# Patient Record
Sex: FEMALE | Race: BLACK OR AFRICAN AMERICAN | NOT HISPANIC OR LATINO | Employment: OTHER | URBAN - METROPOLITAN AREA
[De-identification: names, ages, dates, MRNs, and addresses within clinical notes are randomized per-mention and may not be internally consistent; named-entity substitution may affect disease eponyms.]

---

## 2017-03-01 ENCOUNTER — LAB VISIT (OUTPATIENT)
Dept: LAB | Facility: HOSPITAL | Age: 62
End: 2017-03-01
Attending: INTERNAL MEDICINE
Payer: COMMERCIAL

## 2017-03-01 DIAGNOSIS — M81.0 OSTEOPOROSIS: ICD-10-CM

## 2017-03-01 DIAGNOSIS — E21.1 HYPERPARATHYROIDISM , SECONDARY, NON-RENAL: ICD-10-CM

## 2017-03-01 LAB
25(OH)D3+25(OH)D2 SERPL-MCNC: 39 NG/ML
ALBUMIN SERPL BCP-MCNC: 3.7 G/DL
ALP SERPL-CCNC: 58 U/L
ALT SERPL W/O P-5'-P-CCNC: 27 U/L
ANION GAP SERPL CALC-SCNC: 6 MMOL/L
AST SERPL-CCNC: 23 U/L
BILIRUB SERPL-MCNC: 0.4 MG/DL
BUN SERPL-MCNC: 14 MG/DL
CALCIUM SERPL-MCNC: 8.6 MG/DL
CHLORIDE SERPL-SCNC: 107 MMOL/L
CO2 SERPL-SCNC: 26 MMOL/L
CREAT SERPL-MCNC: 0.8 MG/DL
EST. GFR  (AFRICAN AMERICAN): >60 ML/MIN/1.73 M^2
EST. GFR  (NON AFRICAN AMERICAN): >60 ML/MIN/1.73 M^2
GLUCOSE SERPL-MCNC: 110 MG/DL
PHOSPHATE SERPL-MCNC: 2.9 MG/DL
POTASSIUM SERPL-SCNC: 4.3 MMOL/L
PROT SERPL-MCNC: 7.1 G/DL
PTH-INTACT SERPL-MCNC: 163 PG/ML
SODIUM SERPL-SCNC: 139 MMOL/L

## 2017-03-01 PROCEDURE — 80053 COMPREHEN METABOLIC PANEL: CPT

## 2017-03-01 PROCEDURE — 36415 COLL VENOUS BLD VENIPUNCTURE: CPT | Mod: PO

## 2017-03-01 PROCEDURE — 82306 VITAMIN D 25 HYDROXY: CPT

## 2017-03-01 PROCEDURE — 84100 ASSAY OF PHOSPHORUS: CPT

## 2017-03-01 PROCEDURE — 83970 ASSAY OF PARATHORMONE: CPT

## 2017-03-07 ENCOUNTER — OFFICE VISIT (OUTPATIENT)
Dept: INTERNAL MEDICINE | Facility: CLINIC | Age: 62
End: 2017-03-07
Payer: COMMERCIAL

## 2017-03-07 VITALS
BODY MASS INDEX: 31.34 KG/M2 | OXYGEN SATURATION: 98 % | HEART RATE: 67 BPM | DIASTOLIC BLOOD PRESSURE: 80 MMHG | SYSTOLIC BLOOD PRESSURE: 122 MMHG | HEIGHT: 61 IN | WEIGHT: 166 LBS

## 2017-03-07 DIAGNOSIS — M85.80 OSTEOPENIA: ICD-10-CM

## 2017-03-07 DIAGNOSIS — Z78.0 POSTMENOPAUSAL ESTROGEN DEFICIENCY: ICD-10-CM

## 2017-03-07 DIAGNOSIS — Z12.4 CERVICAL CANCER SCREENING: ICD-10-CM

## 2017-03-07 DIAGNOSIS — I10 BENIGN ESSENTIAL HYPERTENSION: Primary | ICD-10-CM

## 2017-03-07 DIAGNOSIS — J31.0 CHRONIC RHINITIS: ICD-10-CM

## 2017-03-07 DIAGNOSIS — I77.9 BILATERAL CAROTID ARTERY DISEASE: ICD-10-CM

## 2017-03-07 PROCEDURE — 99214 OFFICE O/P EST MOD 30 MIN: CPT | Mod: S$GLB,,, | Performed by: INTERNAL MEDICINE

## 2017-03-07 PROCEDURE — 1160F RVW MEDS BY RX/DR IN RCRD: CPT | Mod: S$GLB,,, | Performed by: INTERNAL MEDICINE

## 2017-03-07 PROCEDURE — 3074F SYST BP LT 130 MM HG: CPT | Mod: S$GLB,,, | Performed by: INTERNAL MEDICINE

## 2017-03-07 PROCEDURE — 3079F DIAST BP 80-89 MM HG: CPT | Mod: S$GLB,,, | Performed by: INTERNAL MEDICINE

## 2017-03-07 PROCEDURE — 99999 PR PBB SHADOW E&M-EST. PATIENT-LVL III: CPT | Mod: PBBFAC,,, | Performed by: INTERNAL MEDICINE

## 2017-03-07 RX ORDER — TRIAMCINOLONE ACETONIDE 55 UG/1
2 SPRAY, METERED NASAL DAILY
Qty: 16.5 G | Refills: 1 | Status: SHIPPED | OUTPATIENT
Start: 2017-03-07 | End: 2023-07-11

## 2017-03-07 NOTE — PROGRESS NOTES
"Subjective:       Patient ID: Nga Livingston is a 61 y.o. female.    Chief Complaint: Follow-up    HPI   HTN - coreg 25mg bid, losartan 25mg daily. Reports BP doing ok at home.   Has been exercising but hasn't been doing that lately. Treadmill and biking. Light weight resistance.   Osteoporosis in the past w/ improvement on fosamax-->osteopenia range. On fosamax for at least 3 yrs. No falls recently but h/o falls.     Reports urination better. On ditropan xl 5mg daily. No issues w/ dysuria/hematuria/urinary frequency/urgency.    Occasionally w/ some rhinorrhea/postnasal drip.     No numbness/tingling/weakness. B carotid artery stenosis 20-39% on US of carotids    Sometimes will get a sharp L sided pain. Attributes it to gas. Doesn't last long. Resolves on its own. Occurs every 2 weeks. Can occur w/ any position. Never occurs w/ exercising.     Review of Systems  as above in HPI.     Objective:      Physical Exam    /80 (BP Location: Right arm, Patient Position: Sitting, BP Method: Manual)  Pulse 67  Ht 5' 1" (1.549 m)  Wt 75.3 kg (166 lb 0.1 oz)  SpO2 98%  BMI 31.37 kg/m2    GEN - A+OX4, NAD   HEENT - PERRL, EOMI, OP clear  Neck - No thyromegaly or cervical LAD. No thyroid masses felt.  CV - RRR, no m/r. Mild L carotid bruit.  Chest - CTAB, no wheezing or rhonchi.  Abd - S/NT/ND/+BS.   Ext - 1+BDP and 2+ radial pulses. No LE edema.   Neuro - 5/5 BUE and BLE strength. 2+ DTRs.   Skin - No rash.    Labs reviewed. Alk phos normalized w/ normalization of Vit d.    Assessment/Plan     Nga was seen today for follow-up.    Diagnoses and all orders for this visit:    Benign essential hypertension - Stable and controlled. Continue current medications.    Postmenopausal estrogen deficiency - on fosamax for >3 yrs. Drug holiday. Repeat dexa.   -     DXA Bone Density Spine And Hip; Future    Osteopenia  -     DXA Bone Density Spine And Hip; Future    BMI 31.37,adult - back to exercising. Watch caloric intake. "     Cervical cancer screening  -     Ambulatory Referral to Obstetrics / Gynecology    Chronic rhinitis  -     triamcinolone (NASACORT) 55 mcg nasal inhaler; 2 sprays by Nasal route once daily.    Bilateral carotid artery disease - on asa 81 and atorva 40. Family history of CVA.  -     Cardiology Lab Carotid US Bilateral; Future      Return in about 4 months (around 7/7/2017). for annual.      Elaine Mahmood MD  Department of Internal Medicine - [facility]  9:27 AM

## 2017-03-07 NOTE — MR AVS SNAPSHOT
[facility]  [address]  [facility] [address]  Phone: [phone]  Fax: [phone]                  Nga Livingston   3/7/2017 9:00 AM   Office Visit    Description:  Female : 1955   Provider:  Elaine Mahmood MD   Department:  [facility]           Reason for Visit     Follow-up           Diagnoses this Visit        Comments    Benign essential hypertension    -  Primary     Postmenopausal estrogen deficiency         Osteopenia         BMI 31.0-31.9,adult         Cervical cancer screening         Chronic rhinitis         Bilateral carotid artery disease                To Do List           Future Appointments        Provider Department Dept Phone    3/21/2017 9:00 AM MD [facility] - Urology 4th Floor [phone]    2017 8:40 AM MD [facility] - Internal Medicine [phone]      Goals (5 Years of Data)     None      Follow-Up and Disposition     Return in about 4 months (around 2017).    Follow-up and Disposition History       These Medications        Disp Refills Start End    triamcinolone (NASACORT) 55 mcg nasal inhaler 16.5 g 1 3/7/2017     2 sprays by Nasal route once daily. - Nasal    Pharmacy: [facility] [facility]. - [facility]ANCOIS - [facility] #: [phone]         [facility] On Call     [facility] On Call Nurse Care Line -  Assistance  Registered nurses in the [facility] On Call Center provide clinical advisement, health education, appointment booking, and other advisory services.  Call for this free service at [phone].             Medications           Message regarding Medications     Verify the changes and/or additions to your medication regime listed below are the same as discussed with your clinician today.  If any of these changes or additions are incorrect, please notify your healthcare provider.        STOP taking these medications     alendronate (FOSAMAX) 70 MG tablet Take once a  "week, in the morning, sitting up on an empty stomach for 30 minutes.    azelastine (ASTELIN) 137 mcg (0.1 %) nasal spray 1 spray (137 mcg total) by Nasal route 2 (two) times daily.           Verify that the below list of medications is an accurate representation of the medications you are currently taking.  If none reported, the list may be blank. If incorrect, please contact your healthcare provider. Carry this list with you in case of emergency.           Current Medications     aspirin 81 MG Chew Take 81 mg by mouth once daily.    atorvastatin (LIPITOR) 40 MG tablet Take 40 mg by mouth once daily.    biotin 1 mg tablet Take 1,000 mcg by mouth once daily.    carvedilol (COREG) 25 MG tablet take 1 tablet by mouth twice a day with meals    esomeprazole (NEXIUM) 40 MG capsule take 1 capsule by mouth every morning    fish oil-omega-3 fatty acids 300-1,000 mg capsule Take 2 g by mouth once daily.    losartan (COZAAR) 25 MG tablet Take 1 tablet (25 mg total) by mouth every evening.    oxybutynin (DITROPAN-XL) 5 MG TR24 Take 1 tablet (5 mg total) by mouth once daily.    RESTASIS 0.05 % ophthalmic emulsion     triamcinolone (NASACORT) 55 mcg nasal inhaler 2 sprays by Nasal route once daily.    vitamin D 1000 units Tab Take 185 mg by mouth once daily.    vitamin E 100 UNIT capsule Take 100 Units by mouth once daily.           Clinical Reference Information           Your Vitals Were     BP Pulse Height Weight SpO2 BMI    122/80 (BP Location: Right arm, Patient Position: Sitting, BP Method: Manual) 67 5' 1" (1.549 m) 75.3 kg (166 lb 0.1 oz) 98% 31.37 kg/m2      Blood Pressure          Most Recent Value    BP  122/80      Allergies as of 3/7/2017     Iodinated Contrast Media - Iv Dye      Immunizations Administered on Date of Encounter - 3/7/2017     None      Orders Placed During Today's Visit      Normal Orders This Visit    Ambulatory Referral to Obstetrics / Gynecology     Future Labs/Procedures Expected by Expires    " DXA Bone Density Spine And Hip  3/7/2017 3/7/2018    Cardiology Lab Carotid US Bilateral  As directed 3/7/2018      [facility] Sign-Up     Activating your [facility] account is as easy as 1-2-3!     1) Visit [email or web address], select Sign Up Now, enter this activation code and your date of birth, then select Next.  [email or web address]  Expires: 4/21/2017  9:38 AM      2) Create a username and password to use when you visit [facility] in the future and select a security question in case you lose your password and select Next.    3) Enter your e-mail address and click Sign Up!    Additional Information  If you have questions, please e-mail [email or web address] or call [phone] to talk to our [facility] staff. Remember, [facility] is NOT to be used for urgent needs. For medical emergencies, dial 911.         Language Assistance Services     ATTENTION: Language assistance services are available, free of charge. Please call [phone].      ATENCIÓN: Si habla español, tiene a laguna disposición servicios gratuitos de asistencia lingüística. Llame al [phone].     JODI Ý: N?u b?n nói Ti?ng Vi?t, có các d?ch v? h? tr? ngôn ng? mi?n phí dành cho b?n. G?i s? [phone].         [facility] complies with applicable Federal civil rights laws and does not discriminate on the basis of race, color, national origin, age, disability, or sex.

## 2017-03-21 ENCOUNTER — OFFICE VISIT (OUTPATIENT)
Dept: UROLOGY | Facility: CLINIC | Age: 62
End: 2017-03-21
Payer: COMMERCIAL

## 2017-03-21 VITALS
DIASTOLIC BLOOD PRESSURE: 83 MMHG | SYSTOLIC BLOOD PRESSURE: 139 MMHG | BODY MASS INDEX: 29.57 KG/M2 | HEIGHT: 63 IN | WEIGHT: 166.88 LBS | HEART RATE: 58 BPM

## 2017-03-21 DIAGNOSIS — E21.1 HYPERPARATHYROIDISM , SECONDARY, NON-RENAL: ICD-10-CM

## 2017-03-21 DIAGNOSIS — Z87.891 FORMER SMOKER: ICD-10-CM

## 2017-03-21 DIAGNOSIS — R35.0 URINARY FREQUENCY: Primary | ICD-10-CM

## 2017-03-21 DIAGNOSIS — R31.29 MICROHEMATURIA: ICD-10-CM

## 2017-03-21 LAB
BILIRUB SERPL-MCNC: NORMAL MG/DL
BLOOD URINE, POC: NORMAL
COLOR, POC UA: NORMAL
GLUCOSE UR QL STRIP: NORMAL
KETONES UR QL STRIP: NORMAL
LEUKOCYTE ESTERASE URINE, POC: NORMAL
NITRITE, POC UA: NORMAL
PH, POC UA: 8
PROTEIN, POC: NORMAL
SPECIFIC GRAVITY, POC UA: 1
UROBILINOGEN, POC UA: NORMAL

## 2017-03-21 PROCEDURE — 99999 PR PBB SHADOW E&M-EST. PATIENT-LVL III: CPT | Mod: PBBFAC,,, | Performed by: UROLOGY

## 2017-03-21 PROCEDURE — 3079F DIAST BP 80-89 MM HG: CPT | Mod: S$GLB,,, | Performed by: UROLOGY

## 2017-03-21 PROCEDURE — 3075F SYST BP GE 130 - 139MM HG: CPT | Mod: S$GLB,,, | Performed by: UROLOGY

## 2017-03-21 PROCEDURE — 81001 URINALYSIS AUTO W/SCOPE: CPT | Mod: S$GLB,,, | Performed by: UROLOGY

## 2017-03-21 PROCEDURE — 99214 OFFICE O/P EST MOD 30 MIN: CPT | Mod: 25,S$GLB,, | Performed by: UROLOGY

## 2017-03-21 PROCEDURE — 1160F RVW MEDS BY RX/DR IN RCRD: CPT | Mod: S$GLB,,, | Performed by: UROLOGY

## 2017-03-21 NOTE — PROGRESS NOTES
Subjective:       Patient ID: Nga Livingston is a 61 y.o. female.    Chief Complaint: Hematuria    HPI Comments: Nga Livingston is a 61 y.o. Female here for follow up of urinary frequency.  Had microhematuria with negative workup 7/15.  She had urinary frequency, urgency, urge incontinence, resolved with oxybutynin XR 5mg.   She is on oxybutynin XR 5mg.   Nocturia not present if limits fluids. She does wear a panty liner.  No dysuria. No gross hematuria.   3 RBC per HPF 4/15. 7/16 ua negative.   Former smoker.    Past Medical History:   Diagnosis Date    Depression     High blood cholesterol     Hypertension     Osteoporosis 3/26/15    outside records from [facility] - T score of L2 is -2.5       History reviewed. No pertinent surgical history.    Family History   Problem Relation Age of Onset    Hypertension Mother     Stroke Mother     Hypothyroidism Mother     Diabetes Sister     Hypothyroidism Sister     Hypothyroidism Maternal Aunt     Diabetes Maternal Uncle        Social History     Social History    Marital status:      Spouse name: N/A    Number of children: N/A    Years of education: N/A     Occupational History    retired teacher      Social History Main Topics    Smoking status: Former Smoker     Packs/day: 0.30     Years: 10.00     Quit date: 4/7/2000    Smokeless tobacco: Not on file    Alcohol use 0.6 oz/week     1 Glasses of wine per week    Drug use: No    Sexual activity: No     Other Topics Concern    Not on file     Social History Narrative       Allergies:  Iodinated contrast media - iv dye    Medications:    Current Outpatient Prescriptions:     aspirin 81 MG Chew, Take 81 mg by mouth once daily., Disp: , Rfl:     atorvastatin (LIPITOR) 40 MG tablet, Take 40 mg by mouth once daily., Disp: , Rfl:     biotin 1 mg tablet, Take 1,000 mcg by mouth once daily., Disp: , Rfl:     carvedilol (COREG) 25 MG tablet, take 1 tablet by mouth twice a day with meals, Disp: 60 tablet, Rfl:  11    esomeprazole (NEXIUM) 40 MG capsule, take 1 capsule by mouth every morning, Disp: 90 capsule, Rfl: 3    fish oil-omega-3 fatty acids 300-1,000 mg capsule, Take 2 g by mouth once daily., Disp: , Rfl:     losartan (COZAAR) 25 MG tablet, Take 1 tablet (25 mg total) by mouth every evening., Disp: 90 tablet, Rfl: 3    oxybutynin (DITROPAN-XL) 5 MG TR24, Take 1 tablet (5 mg total) by mouth once daily., Disp: 30 tablet, Rfl: 11    RESTASIS 0.05 % ophthalmic emulsion, , Disp: , Rfl:     triamcinolone (NASACORT) 55 mcg nasal inhaler, 2 sprays by Nasal route once daily., Disp: 16.5 g, Rfl: 1    vitamin D 1000 units Tab, Take 185 mg by mouth once daily., Disp: , Rfl:     vitamin E 100 UNIT capsule, Take 100 Units by mouth once daily., Disp: , Rfl:       Review of Systems   Constitutional: Negative for fever, chills and unexpected weight change.   HENT: Negative for nosebleeds.    Eyes: Negative for visual disturbance.   Respiratory: Negative for chest tightness.    Cardiovascular: Negative for chest pain.   Gastrointestinal: Negative for diarrhea.   Genitourinary: Negative for frequency, urgency.  Negative for dysuria.   Musculoskeletal: Negative for myalgias.   Skin: Negative for rash.   Neurological: Negative for seizures.   Hematological: Does not bruise/bleed easily.   Psychiatric/Behavioral: Negative for behavioral problems.       Objective:      Physical Exam   Vitals reviewed.  Constitutional: She is oriented to person, place, and time. She appears well-developed and well-nourished.   HENT:   Head: Normocephalic and atraumatic.   Eyes: No scleral icterus.   Cardiovascular: Normal rate, regular rhythm and normal heart sounds.    Pulmonary/Chest: Effort normal. No respiratory distress.   Abdominal: Soft. She exhibits no distension and no mass. There is no tenderness.   Musculoskeletal: She exhibits no tenderness.   Lymphadenopathy:     She has no cervical adenopathy.   Neurological: She is alert and oriented  to person, place, and time.   Skin: Skin is warm and dry. No rash noted.   Psychiatric: She has a normal mood and affect.     urine dip clear  Assessment:       1. Microhematuria    2. Former smoker    3. Urinary frequency        Plan:   Continue oxybutynin XR 5mg. Patient does not want to increase dose.   Up to date with all health maintenance. She has follow up appts tomorrow with a carotid ultrasound, DEXA and OB/GYN.  She is trying to exercise some. She is not consistent.   Carotid stenosis - ultrasound tomorrow.   F/u 1 year.     I spent 25 minutes with the patient of which more than half was spent in direct consultation with the patient in regards to our treatment and plan.

## 2017-03-21 NOTE — MR AVS SNAPSHOT
[facility] - Urology 4th Floor  [address]  [facility] [address]  Phone: [phone]                  Nga Livingston   3/21/2017 9:00 AM   Office Visit    Description:  Female : 1955   Provider:  Monie Urias MD   Department:  [facility] - Urology 4th Floor           Reason for Visit     Follow-up           Diagnoses this Visit        Comments    Urinary frequency    -  Primary     Microhematuria         Hyperparathyroidism , secondary, non-renal         Former smoker                To Do List           Future Appointments        Provider Department Dept Phone    3/22/2017 9:45 AM Isha Villareal MD [facility] - OB/GYN 5th Floor [phone]    3/22/2017 11:00 AM VASCULAR, CARDIOLOGY [facility] - Vascular Cardiology [phone]    3/22/2017 1:00 PM [facility], DEXA1 [facility]-Bone Mineral Density [phone]    2017 8:40 AM Elaine Mahmood MD [facility] - Internal Medicine [phone]      Goals (5 Years of Data)     None      Follow-Up and Disposition     Return in about 1 year (around 3/21/2018).      [facility] On Call     [facility] On Call Nurse Care Line -  Assistance  Registered nurses in the [facility] On Call Center provide clinical advisement, health education, appointment booking, and other advisory services.  Call for this free service at [phone].             Medications           Message regarding Medications     Verify the changes and/or additions to your medication regime listed below are the same as discussed with your clinician today.  If any of these changes or additions are incorrect, please notify your healthcare provider.             Verify that the below list of medications is an accurate representation of the medications you are currently taking.  If none reported, the list may be blank. If incorrect, please contact your healthcare provider. Carry this list with you in case of emergency.           Current Medications     aspirin 81 MG Chew Take 81 mg by mouth once  "daily.    atorvastatin (LIPITOR) 40 MG tablet Take 40 mg by mouth once daily.    biotin 1 mg tablet Take 1,000 mcg by mouth once daily.    carvedilol (COREG) 25 MG tablet take 1 tablet by mouth twice a day with meals    esomeprazole (NEXIUM) 40 MG capsule take 1 capsule by mouth every morning    fish oil-omega-3 fatty acids 300-1,000 mg capsule Take 2 g by mouth once daily.    losartan (COZAAR) 25 MG tablet Take 1 tablet (25 mg total) by mouth every evening.    oxybutynin (DITROPAN-XL) 5 MG TR24 Take 1 tablet (5 mg total) by mouth once daily.    RESTASIS 0.05 % ophthalmic emulsion     triamcinolone (NASACORT) 55 mcg nasal inhaler 2 sprays by Nasal route once daily.    vitamin D 1000 units Tab Take 185 mg by mouth once daily.    vitamin E 100 UNIT capsule Take 100 Units by mouth once daily.           Clinical Reference Information           Your Vitals Were     BP Pulse Height Weight BMI    139/83 58 5' 3" (1.6 m) 75.7 kg (166 lb 14.2 oz) 29.56 kg/m2      Blood Pressure          Most Recent Value    BP  139/83      Allergies as of 3/21/2017     Iodinated Contrast Media - Iv Dye      Immunizations Administered on Date of Encounter - 3/21/2017     None      Orders Placed During Today's Visit      Normal Orders This Visit    POCT urinalysis, dipstick or tablet reag          3/21/2017  9:22 AM - Mile Mckeon LPN      Component Results     Component    Color    Spec Grav    1.005    pH, UA    8    WBC, UA    n    Nitrite    n    Protein    n    Glucose, UA    n    Ketones, UA    n    Urobilinogen    n    Bilirubin    n    Blood, UA    n            [facility] Sign-Up     Activating your [facility] account is as easy as 1-2-3!     1) Visit [email or web address], select Sign Up Now, enter this activation code and your date of birth, then select Next.  [email or web address]  Expires: 4/21/2017 10:38 AM      2) Create a username and password to use when you visit [facility] in the future and select a security question in case you lose " your password and select Next.    3) Enter your e-mail address and click Sign Up!    Additional Information  If you have questions, please e-mail [email or web address] or call [phone] to talk to our [facility] staff. Remember, [facility] is NOT to be used for urgent needs. For medical emergencies, dial 911.         Language Assistance Services     ATTENTION: Language assistance services are available, free of charge. Please call [phone].      ATENCIÓN: Si habla español, tiene a laguna disposición servicios gratuitos de asistencia lingüística. Llame al [phone].     CHÚ Ý: N?u b?n nói Ti?ng Vi?t, có các d?ch v? h? tr? ngôn ng? mi?n phí dành cho b?n. G?i s? [phone].         [facility] 4th Floor complies with applicable Federal civil rights laws and does not discriminate on the basis of race, color, national origin, age, disability, or sex.

## 2017-03-22 ENCOUNTER — TELEPHONE (OUTPATIENT)
Dept: INTERNAL MEDICINE | Facility: CLINIC | Age: 62
End: 2017-03-22

## 2017-03-22 ENCOUNTER — OFFICE VISIT (OUTPATIENT)
Dept: OBSTETRICS AND GYNECOLOGY | Facility: CLINIC | Age: 62
End: 2017-03-22
Payer: COMMERCIAL

## 2017-03-22 ENCOUNTER — CLINICAL SUPPORT (OUTPATIENT)
Dept: CARDIOLOGY | Facility: CLINIC | Age: 62
End: 2017-03-22
Payer: COMMERCIAL

## 2017-03-22 ENCOUNTER — HOSPITAL ENCOUNTER (OUTPATIENT)
Dept: RADIOLOGY | Facility: CLINIC | Age: 62
Discharge: HOME | End: 2017-03-22
Attending: INTERNAL MEDICINE
Payer: COMMERCIAL

## 2017-03-22 VITALS
HEIGHT: 63 IN | SYSTOLIC BLOOD PRESSURE: 110 MMHG | WEIGHT: 165.38 LBS | DIASTOLIC BLOOD PRESSURE: 80 MMHG | BODY MASS INDEX: 29.3 KG/M2

## 2017-03-22 DIAGNOSIS — Z01.419 ENCOUNTER FOR GYNECOLOGICAL EXAMINATION WITHOUT ABNORMAL FINDING: Primary | ICD-10-CM

## 2017-03-22 DIAGNOSIS — I77.9 BILATERAL CAROTID ARTERY DISEASE: ICD-10-CM

## 2017-03-22 DIAGNOSIS — Z78.0 POSTMENOPAUSAL ESTROGEN DEFICIENCY: ICD-10-CM

## 2017-03-22 DIAGNOSIS — Z12.4 PAP SMEAR FOR CERVICAL CANCER SCREENING: ICD-10-CM

## 2017-03-22 DIAGNOSIS — R35.0 URINARY FREQUENCY: ICD-10-CM

## 2017-03-22 DIAGNOSIS — N95.2 POSTMENOPAUSAL ATROPHIC VAGINITIS: ICD-10-CM

## 2017-03-22 DIAGNOSIS — Z12.31 SCREENING MAMMOGRAM FOR HIGH-RISK PATIENT: ICD-10-CM

## 2017-03-22 DIAGNOSIS — R39.15 URINARY URGENCY: ICD-10-CM

## 2017-03-22 DIAGNOSIS — M85.80 OSTEOPENIA: ICD-10-CM

## 2017-03-22 LAB — INTERNAL CAROTID STENOSIS: ABNORMAL

## 2017-03-22 PROCEDURE — 87088 URINE BACTERIA CULTURE: CPT

## 2017-03-22 PROCEDURE — 99386 PREV VISIT NEW AGE 40-64: CPT | Mod: S$GLB,,, | Performed by: OBSTETRICS & GYNECOLOGY

## 2017-03-22 PROCEDURE — 3074F SYST BP LT 130 MM HG: CPT | Mod: S$GLB,,, | Performed by: OBSTETRICS & GYNECOLOGY

## 2017-03-22 PROCEDURE — 87186 SC STD MICRODIL/AGAR DIL: CPT

## 2017-03-22 PROCEDURE — 77080 DXA BONE DENSITY AXIAL: CPT | Mod: TC

## 2017-03-22 PROCEDURE — 99999 PR PBB SHADOW E&M-EST. PATIENT-LVL III: CPT | Mod: PBBFAC,,, | Performed by: OBSTETRICS & GYNECOLOGY

## 2017-03-22 PROCEDURE — 88175 CYTOPATH C/V AUTO FLUID REDO: CPT

## 2017-03-22 PROCEDURE — 87077 CULTURE AEROBIC IDENTIFY: CPT

## 2017-03-22 PROCEDURE — 3079F DIAST BP 80-89 MM HG: CPT | Mod: S$GLB,,, | Performed by: OBSTETRICS & GYNECOLOGY

## 2017-03-22 PROCEDURE — 93880 EXTRACRANIAL BILAT STUDY: CPT | Mod: S$GLB,,, | Performed by: INTERNAL MEDICINE

## 2017-03-22 PROCEDURE — 87086 URINE CULTURE/COLONY COUNT: CPT

## 2017-03-22 PROCEDURE — 77080 DXA BONE DENSITY AXIAL: CPT | Mod: 26,,, | Performed by: INTERNAL MEDICINE

## 2017-03-22 RX ORDER — ATORVASTATIN CALCIUM 40 MG/1
80 TABLET, FILM COATED ORAL DAILY
Qty: 180 TABLET | Refills: 0
Start: 2017-03-22 | End: 2017-04-24

## 2017-03-22 RX ORDER — ESTRADIOL 0.1 MG/G
0.5 CREAM VAGINAL
Qty: 42 G | Refills: 4 | Status: SHIPPED | OUTPATIENT
Start: 2017-03-23 | End: 2017-07-07

## 2017-03-22 NOTE — TELEPHONE ENCOUNTER
Called pt. No answer. Left message for pt to call back. Will also send a letter.    If pt calls back, let her know  Carotid ultrasound with 20-39% right internal carotid stenosis.  Left internal carotid stenosis slightly worse than last year.  It is currently at 40-49%.  I would like to increase atorvastatin from the 40 mg daily to the 80 mg daily to further decrease the risk of stroke.  She can take atorvastatin 2 of the 40 mg tabs daily until she runs out. Please have her notify us when she needs the 80mg refill.

## 2017-03-22 NOTE — MR AVS SNAPSHOT
[facility] OB/GYN 5th Floor  [address]  [facility] [address]  Phone: [phone]                  Nga Livingston   3/22/2017 9:45 AM   Office Visit    Description:  Female : 1955   Provider:  Isha Villareal MD   Department:  [facility] - OB/GYN 5th Floor           Reason for Visit     Well Woman           Diagnoses this Visit        Comments    Encounter for gynecological examination without abnormal finding    -  Primary     Screening mammogram for high-risk patient         Pap smear for cervical cancer screening                To Do List           Future Appointments        Provider Department Dept Phone    3/22/2017 11:00 AM VASCULAR, CARDIOLOGY [facility] - Vascular Cardiology [phone]    3/22/2017 1:00 PM [facility], DEXA1 [facility]-Bone Mineral Density [phone]    2017 8:40 AM MD [facility] - Internal Medicine [phone]      Goals (5 Years of Data)     None      Follow-Up and Disposition     Return in about 1 year (around 3/22/2018).      [facility] Call     [facility] On Call Nurse [facility] Line - 24/7 Assistance  Registered nurses in the [facility] On Call Center provide clinical advisement, health education, appointment booking, and other advisory services.  Call for this free service at [phone].             Medications           Message regarding Medications     Verify the changes and/or additions to your medication regime listed below are the same as discussed with your clinician today.  If any of these changes or additions are incorrect, please notify your healthcare provider.             Verify that the below list of medications is an accurate representation of the medications you are currently taking.  If none reported, the list may be blank. If incorrect, please contact your healthcare provider. Carry this list with you in case of emergency.           Current Medications     aspirin 81 MG Chew Take 81 mg by mouth once daily.    atorvastatin (LIPITOR) 40 MG  "tablet Take 40 mg by mouth once daily.    biotin 1 mg tablet Take 1,000 mcg by mouth once daily.    carvedilol (COREG) 25 MG tablet take 1 tablet by mouth twice a day with meals    esomeprazole (NEXIUM) 40 MG capsule take 1 capsule by mouth every morning    fish oil-omega-3 fatty acids 300-1,000 mg capsule Take 2 g by mouth once daily.    losartan (COZAAR) 25 MG tablet Take 1 tablet (25 mg total) by mouth every evening.    oxybutynin (DITROPAN-XL) 5 MG TR24 Take 1 tablet (5 mg total) by mouth once daily.    RESTASIS 0.05 % ophthalmic emulsion     triamcinolone (NASACORT) 55 mcg nasal inhaler 2 sprays by Nasal route once daily.    vitamin D 1000 units Tab Take 185 mg by mouth once daily.    vitamin E 100 UNIT capsule Take 100 Units by mouth once daily.           Clinical Reference Information           Your Vitals Were     BP Height Weight Last Period BMI    110/80 5' 3" (1.6 m) 75 kg (165 lb 5.5 oz) 03/22/2007 29.29 kg/m2      Blood Pressure          Most Recent Value    BP  110/80      Allergies as of 3/22/2017     Iodinated Contrast Media - Iv Dye      Immunizations Administered on Date of Encounter - 3/22/2017     None      Orders Placed During Today's Visit      Normal Orders This Visit    Liquid-based pap smear, screening     Future Labs/Procedures Expected by Expires    Mammo Digital Screening Bilat with Tomosynthesis CAD  3/22/2017 5/22/2018      Language Assistance Services     ATTENTION: Language assistance services are available, free of charge. Please call [phone].      ATENCIÓN: Si habla carrie, tiene a laguna disposición servicios gratuitos de asistencia lingüística. Llame al [phone].     JODI Ý: N?u b?n nói Ti?ng Vi?t, có các d?ch v? h? tr? ngôn ng? mi?n phí dành cho b?n. G?i s? [phone].         [facility] - OB/GYN 5th Floor complies with applicable Federal civil rights laws and does not discriminate on the basis of race, color, national origin, age, disability, or sex.        "

## 2017-03-22 NOTE — LETTER
March 22, 2017      Elaine Mahmood MD  [address]  [facility] [address]           [facility] 5th Floor  [address]  [facility] [address]  Phone: [phone]          Patient: Nga Livingston   MR Number: [id number]   YOB: 1955   Date of Visit: 3/22/2017       Dear Dr. Elaine Mahmood:    Thank you for referring Nga Livingston to me for evaluation. Attached you will find relevant portions of my assessment and plan of care.    If you have questions, please do not hesitate to call me. I look forward to following Nga Livingston along with you.    Sincerely,    Isha Villareal MD    Enclosure  CC:  No Recipients    If you would like to receive this communication electronically, please contact [email or web address] or [phone] to request more information on [facility] access.    For providers and/or their staff who would like to refer a patient to [facility], please contact us through our one-stop-shop provider referral line, [facility], at [phone].    If you feel you have received this communication in error or would no longer like to receive these types of communications, please e-mail [email or web address]

## 2017-03-22 NOTE — PROGRESS NOTES
Subjective:       Patient ID: Nga Livingston is a 61 y.o. female.    Chief Complaint:  Well Woman      History of Present Illness  HPI  Nga Livingston is a 61 y.o. female  NEW TO ME here for her annual GYN exam.    She describes her periods as stopped with menopause age 52.   Denies break through/postmenopausal bleeding.   Denies vaginal itching or irritation.  Denies vaginal discharge.  She is not currently sexually active.   History of abnormal pap: No  Last Pap: approximate date  and was normal  Last MMG: normal-2016-routine follow-up in 12 months  Last Colonoscopy:  colonoscopy 3 years ago without abnormalities.  Denies domestic violence. She does feel safe at home.     Past Medical History:   Diagnosis Date    Depression     High blood cholesterol     Hypertension     Osteoporosis 3/26/15    outside records from [facility] - T score of L2 is -2.5     Past Surgical History:   Procedure Laterality Date    TUBAL LIGATION       Social History     Social History    Marital status:      Spouse name: N/A    Number of children: N/A    Years of education: N/A     Occupational History    retired teacher      Social History Main Topics    Smoking status: Former Smoker     Packs/day: 0.30     Years: 10.00     Quit date: 2000    Smokeless tobacco: Never Used    Alcohol use 1.2 oz/week     2 Glasses of wine per week    Drug use: No    Sexual activity: No      Comment: Has not been sexually active x 3 years     Other Topics Concern    Not on file     Social History Narrative     Family History   Problem Relation Age of Onset    Hypertension Mother     Stroke Mother     Hypothyroidism Mother     Diabetes Sister     Hypothyroidism Sister     Hypothyroidism Maternal Aunt     Diabetes Maternal Uncle     Hypothyroidism Maternal Uncle     Breast cancer Neg Hx     Colon cancer Neg Hx      OB History      Para Term  AB TAB SAB Ectopic Multiple Living    2    2 2    "           /80  Ht 5' 3" (1.6 m)  Wt 75 kg (165 lb 5.5 oz)  LMP 2007  BMI 29.29 kg/m2        GYN & OB History  Patient's last menstrual period was 2007.   Date of Last Pap: No result found    OB History    Para Term  AB SAB TAB Ectopic Multiple Living   2    2  2         # Outcome Date GA Lbr Renny/2nd Weight Sex Delivery Anes PTL Lv   2 TAB            1 TAB                   Review of Systems  Review of Systems   Constitutional: Negative for activity change, appetite change, fatigue and unexpected weight change.   HENT: Negative.    Eyes: Negative for visual disturbance.   Respiratory: Negative for shortness of breath and wheezing.    Cardiovascular: Negative for chest pain, palpitations and leg swelling.   Gastrointestinal: Negative for abdominal pain, bloating and blood in stool.   Endocrine: Positive for hot flashes. Negative for diabetes and hair loss.   Genitourinary: Positive for frequency and urgency. Negative for decreased libido, dyspareunia and postmenopausal bleeding.   Musculoskeletal: Negative for back pain and joint swelling.   Skin:  Negative for no acne and hair changes.   Neurological: Negative for headaches.   Hematological: Does not bruise/bleed easily.   Psychiatric/Behavioral: Positive for sleep disturbance. Negative for depression. The patient is nervous/anxious.    Breast: Negative for breast pain and nipple discharge          Objective:    Physical Exam:   Constitutional: She is oriented to person, place, and time. She appears well-developed and well-nourished.    HENT:   Head: Normocephalic and atraumatic.    Eyes: EOM are normal. Pupils are equal, round, and reactive to light.    Neck: Normal range of motion. Neck supple.    Cardiovascular: Normal rate and regular rhythm.     Pulmonary/Chest: Effort normal and breath sounds normal.   BREASTS: Symmetrical, no skin changes or visible lesions.  No palpable masses, nipple discharge bilaterally.      "     Abdominal: Soft. Bowel sounds are normal.     Genitourinary: Vagina normal. Pelvic exam was performed with patient supine.   Genitourinary Comments: PELVIC: Normal external genitalia without lesions.  Normal hair distribution.  Adequate perineal body, normal urethral meatus.  Vagina  SEVERELY Dry and poorly rugated, atrophic, without lesions or discharge.  Cervix pink, without lesions, discharge or tenderness.  No significant cystocele or rectocele.  Bimanual exam shows uterus to be NOT PALPABLE SECONDARY TO HABITUS, and nontender.  Adnexa without masses or tenderness.    RECTAL:Deferred             Musculoskeletal: Normal range of motion and moves all extremeties.       Neurological: She is alert and oriented to person, place, and time.    Skin: Skin is warm and dry.    Psychiatric: She has a normal mood and affect.          Assessment:        1. Encounter for gynecological examination without abnormal finding    2. Screening mammogram for high-risk patient    3. Pap smear for cervical cancer screening    4. Urinary urgency    5. Urinary frequency    6. Postmenopausal atrophic vaginitis                Plan:      1. Encounter for gynecological examination without abnormal finding  COUNSELING:  The patient was counseled today on osteoporosis prevention, calcium supplementation, and regular weight bearing exercise. The patient was also counseled today on ACS PAP guidelines, with recommendations for yearly pelvic exams unless their uterus, cervix, and ovaries were removed for benign reasons; in that case, examinations every 3-5 years are recommended. The patient was also counseled regarding monthly breast self-examination, routine STD screening for at-risk populations, prophylactic immunizations for transmitted infections such as HPV, Pertussis, or Influenza as appropriate, and yearly mammograms when indicated by ACS guidelines. She was advised to see her primary care physician for all other health maintenance.    FOLLOW-UP with me for next routine visit.         2. Screening mammogram for high-risk patient      - Mammo Digital Screening Bilat with Tomosynthesis CAD; Future    3. Pap smear for cervical cancer screening      - Liquid-based pap smear, screening    4. Urinary urgency      - Urine culture    5. Urinary frequency      - Urine culture    6. Postmenopausal atrophic vaginitis      - estradiol (ESTRACE) 0.01 % (0.1 mg/gram) vaginal cream; Place 0.5 g vaginally twice a week. Insert 0.5grams intravaginally twice weekly  Dispense: 42 g; Refill: 4       Return in about 1 year (around 3/22/2018).

## 2017-03-25 LAB — BACTERIA UR CULT: NORMAL

## 2017-03-27 ENCOUNTER — TELEPHONE (OUTPATIENT)
Dept: OBSTETRICS AND GYNECOLOGY | Facility: CLINIC | Age: 62
End: 2017-03-27

## 2017-03-27 ENCOUNTER — TELEPHONE (OUTPATIENT)
Dept: INTERNAL MEDICINE | Facility: CLINIC | Age: 62
End: 2017-03-27

## 2017-03-27 DIAGNOSIS — N39.0 UTI (URINARY TRACT INFECTION), UNCOMPLICATED: Primary | ICD-10-CM

## 2017-03-27 RX ORDER — SULFAMETHOXAZOLE AND TRIMETHOPRIM 800; 160 MG/1; MG/1
1 TABLET ORAL 2 TIMES DAILY
Qty: 14 TABLET | Refills: 0 | Status: SHIPPED | OUTPATIENT
Start: 2017-03-27 | End: 2017-07-07 | Stop reason: ALTCHOICE

## 2017-03-27 NOTE — TELEPHONE ENCOUNTER
Bone density scan with osteopenia. As discussed, stop the fosamax altogether and we'll repeat a bone density in 2 yrs. I recommend high calcium diet, vitamin D over the counter 1000 units daily and light weight bearing exercises.

## 2017-03-27 NOTE — TELEPHONE ENCOUNTER
Communicated results of urine culture to pt and that a prescritpion for Bactrim was sent to the [facility] on [address]. Pt communicated understanding.

## 2017-03-27 NOTE — TELEPHONE ENCOUNTER
Left messages on both phone numbers listed for pt requesting a call back at [phone].     Will inform pt that urine culture came back positive for a UTI and that Bactrim -160 mg Tab PO BID was sent to the [facility] on [facility].

## 2017-04-24 RX ORDER — ATORVASTATIN CALCIUM 40 MG/1
80 TABLET, FILM COATED ORAL DAILY
Qty: 180 TABLET | Refills: 0 | Status: CANCELLED
Start: 2017-04-24

## 2017-04-24 RX ORDER — ATORVASTATIN CALCIUM 80 MG/1
80 TABLET, FILM COATED ORAL DAILY
Qty: 90 TABLET | Refills: 3 | Status: SHIPPED | OUTPATIENT
Start: 2017-04-24 | End: 2017-08-09 | Stop reason: DRUGHIGH

## 2017-04-24 NOTE — TELEPHONE ENCOUNTER
----- Message from Sowmya Champion MA sent at 4/24/2017 12:21 PM CDT -----  Contact: self - [phone]   Requesting to speak with Dr Mahmood regarding refilling her Rx for atorvastatin (LIPITOR) 40 MG tablet and increasing the dosage. Please call. Thanks!

## 2017-06-19 ENCOUNTER — TELEPHONE (OUTPATIENT)
Dept: INTERNAL MEDICINE | Facility: CLINIC | Age: 62
End: 2017-06-19

## 2017-06-19 NOTE — TELEPHONE ENCOUNTER
Called pt. No answer. Please see if a cholesterol panel was done w/ Dr. Alfred. If so, can we get a copy so I can review it. I think it would be ok to take 1/2 of the 80mg daily for now. Please try to contact pt and let her know.

## 2017-06-19 NOTE — TELEPHONE ENCOUNTER
----- Message from Miya Kaur sent at 6/19/2017 10:42 AM CDT -----  Contact: [phone]  Patient is on atorvastatin (LIPITOR) 80 MG tablet but her Cardiologist Dr. Cielo Alfred at [phone] told her to take half of that 40 mg. Patient would like her Dr to advise her on what she should be taking.

## 2017-06-19 NOTE — TELEPHONE ENCOUNTER
Dr yan staff stated pt verbalized that she was feeling bad on 80mg atorvastatin so he decreased it, also inquired about carotid study and stated at the levels she has it can be monitored and decreased medication to 40mg qd

## 2017-06-30 DIAGNOSIS — I10 BENIGN ESSENTIAL HYPERTENSION: ICD-10-CM

## 2017-06-30 RX ORDER — LOSARTAN POTASSIUM 25 MG/1
TABLET ORAL
Qty: 90 TABLET | Refills: 3 | Status: SHIPPED | OUTPATIENT
Start: 2017-06-30 | End: 2019-06-17

## 2017-07-07 ENCOUNTER — OFFICE VISIT (OUTPATIENT)
Dept: INTERNAL MEDICINE | Facility: CLINIC | Age: 62
End: 2017-07-07
Payer: COMMERCIAL

## 2017-07-07 ENCOUNTER — HOSPITAL ENCOUNTER (OUTPATIENT)
Dept: RADIOLOGY | Facility: HOSPITAL | Age: 62
Discharge: HOME | End: 2017-07-07
Attending: INTERNAL MEDICINE
Payer: COMMERCIAL

## 2017-07-07 ENCOUNTER — TELEPHONE (OUTPATIENT)
Dept: INTERNAL MEDICINE | Facility: CLINIC | Age: 62
End: 2017-07-07

## 2017-07-07 VITALS
SYSTOLIC BLOOD PRESSURE: 122 MMHG | HEIGHT: 60 IN | BODY MASS INDEX: 33.04 KG/M2 | WEIGHT: 168.31 LBS | TEMPERATURE: 98 F | DIASTOLIC BLOOD PRESSURE: 78 MMHG | RESPIRATION RATE: 17 BRPM | HEART RATE: 62 BPM

## 2017-07-07 DIAGNOSIS — R05.3 CHRONIC COUGH: ICD-10-CM

## 2017-07-07 DIAGNOSIS — I77.9 BILATERAL CAROTID ARTERY DISEASE: ICD-10-CM

## 2017-07-07 DIAGNOSIS — E04.1 THYROID NODULE: ICD-10-CM

## 2017-07-07 DIAGNOSIS — E78.5 HYPERLIPIDEMIA, UNSPECIFIED HYPERLIPIDEMIA TYPE: ICD-10-CM

## 2017-07-07 DIAGNOSIS — M85.80 OSTEOPENIA, UNSPECIFIED LOCATION: ICD-10-CM

## 2017-07-07 DIAGNOSIS — B36.0 TINEA VERSICOLOR: ICD-10-CM

## 2017-07-07 DIAGNOSIS — Z00.00 ANNUAL PHYSICAL EXAM: Primary | ICD-10-CM

## 2017-07-07 DIAGNOSIS — D64.9 NORMOCYTIC ANEMIA: Primary | ICD-10-CM

## 2017-07-07 DIAGNOSIS — I10 BENIGN ESSENTIAL HYPERTENSION: ICD-10-CM

## 2017-07-07 PROCEDURE — 71020 XR CHEST PA AND LATERAL: CPT | Mod: 26,,, | Performed by: RADIOLOGY

## 2017-07-07 PROCEDURE — 99999 PR PBB SHADOW E&M-EST. PATIENT-LVL V: CPT | Mod: PBBFAC,,, | Performed by: INTERNAL MEDICINE

## 2017-07-07 PROCEDURE — 71020 XR CHEST PA AND LATERAL: CPT | Mod: TC

## 2017-07-07 PROCEDURE — 99396 PREV VISIT EST AGE 40-64: CPT | Mod: S$GLB,,, | Performed by: INTERNAL MEDICINE

## 2017-07-07 NOTE — PROGRESS NOTES
INTERNAL MEDICINE VISIT NOTE      CHIEF COMPLAINT     Chief Complaint   Patient presents with    Annual Exam    Cough    Back Pain     5       HPI     Nga Livingston is a 61 y.o. AA female who presents for follow up.    HTN - losartan 25mg daily, coreg 25mg BID. BP controlled at home.     Chronic cough (occasionally productive). No SOB/wheezing. No postnasal drip/rhinorrhea/sore throat/congestion. No fevers/chills. Former smoker - quit about 20 yrs ago. Does have GERD - takes nexium every other day in the AM. Controls the acid reflux.     Osteoporosis - was on fosamax 70mg weekly. Recent DEXA 3/22/17 w/ osteopenia of the hip and spine. Given drug holiday w/ repeat DEXA in 2 yrs. Takes vit D 1000 units daily. High calcium diet.     HLD - atorvastatin 40mg daily, asa 81mg daily.  B carotid artery disease - carotid US 3/22/17 - L 40-49% and R 20-39%    H/o microhematuria. On oxybutynin XR 5mg daily. Saw Dr. Urias 3/21/17. F/u in 1 yr.    Depression - previously on wellbutrin.     Vaginal dryness - on premarin previously. Didn't want risk of breast CA so stopped on it.    Past Medical History:  Past Medical History:   Diagnosis Date    Depression     High blood cholesterol     Hypertension     Osteoporosis 3/26/15    outside records from [facility] - T score of L2 is -2.5       Past Surgical History:  Past Surgical History:   Procedure Laterality Date    TUBAL LIGATION         Allergies:  Review of patient's allergies indicates:   Allergen Reactions    Iodinated contrast- oral and iv dye Diarrhea and Rash       meds reviewed    Family History:  Family History   Problem Relation Age of Onset    Hypertension Mother     Stroke Mother     Hypothyroidism Mother     Diabetes Sister     Hypothyroidism Sister     Hypothyroidism Maternal Aunt     Diabetes Maternal Uncle     Hypothyroidism Maternal Uncle     Breast cancer Neg Hx     Colon cancer Neg Hx        Social History:  Social History   Substance Use  Topics    Smoking status: Former Smoker     Packs/day: 0.30     Years: 10.00     Quit date: 4/7/2000    Smokeless tobacco: Never Used    Alcohol use 1.2 oz/week     2 Glasses of wine per week       Review of Systems:  Review of Systems   Constitutional: Negative for chills and fever.   HENT: Negative for congestion, postnasal drip, rhinorrhea, sore throat and trouble swallowing.    Eyes: Negative for visual disturbance (wears glasses).   Respiratory: Positive for cough. Negative for shortness of breath and wheezing.    Cardiovascular: Negative for chest pain, palpitations and leg swelling.   Gastrointestinal: Negative for abdominal pain, blood in stool, constipation, diarrhea, nausea and vomiting.   Genitourinary: Negative for dysuria, frequency (resolved w/ oxybutynin xr 5mg daily) and hematuria.   Musculoskeletal: Positive for back pain (lower back. thinks she picked up a 24 pack of water recently. ). Negative for arthralgias.   Skin: Negative for rash.   Neurological: Negative for dizziness, weakness, light-headedness and headaches.   Psychiatric/Behavioral: Negative.        Health Maintainence:   Td 4/7/15 - repeat in 10 yrs.   Flu 10/4/16 - every fall.  Zoster 12/8/15  MMG 11/15/16 - repeat in a yr.  C-SCOPE 6/16/15 - repeat in 5-10 yrs.  DEXA 3/22/17 - repeat in 2 yrs  Hep C screening 4/7/15 negative.    PHYSICAL EXAM     /78   Pulse 62   Temp 98.2 °F (36.8 °C) (Oral)   Resp 17   Ht 5' (1.524 m)   Wt 76.4 kg (168 lb 5.1 oz)   LMP 03/22/2007   BMI 32.87 kg/m²     GEN - A+OX4, NAD   HEENT - PERRL, EOMI, OP clear. MMM. Erythematous nasal mucosa.  Neck - No thyromegaly or cervical LAD. L to midline thyroid nodule.  CV - RRR, no m/r   Chest - CTAB, no wheezing or rhonchi  Abd - S/NT/ND/+BS.   Ext - 2+BDP and radial pulses. No LE edema.   Neuro - PERRL, EOMI, no nystagmus, eyebrow raise, facial sensation, hearing, m of mastication, smile, palatal raise, shoulder shrug, tongue protrusion symmetric and  intact. 5/5 BUE and BLE strength. Sensation to light touch intact throughout. 2+ DTRs. Normal gait.   MSK - No spinal tenderness to palpation. Normal gait.   Skin - tinea versicolor of BUE and back.    LABS     Previous labs reviewed.    ASSESSMENT/PLAN     Nga Livingston is a 61 y.o. female with  Nga was seen today for annual exam, cough and back pain.    Diagnoses and all orders for this visit:    Annual physical exam  -     CBC auto differential; Future; Expected date: 07/07/2017  -     Comprehensive metabolic panel; Future; Expected date: 07/07/2017  -     Hemoglobin A1c; Future; Expected date: 07/07/2017  -     Lipid panel; Future; Expected date: 07/07/2017  -     TSH; Future; Expected date: 07/07/2017  -     Vitamin D; Future; Expected date: 07/07/2017    Chronic cough - on nexium. Tried on nasacort. No h/o asthma. Former smoker. Will get CXR.  -     X-Ray Chest PA And Lateral; Future; Expected date: 07/07/2017  -     Ambulatory consult to Pulmonology    Benign essential hypertension - Stable and controlled. Continue current medications.  -     Comprehensive metabolic panel; Future; Expected date: 07/07/2017    Hyperlipidemia, unspecified hyperlipidemia type  -     Comprehensive metabolic panel; Future; Expected date: 07/07/2017  -     Lipid panel; Future; Expected date: 07/07/2017    Bilateral carotid artery disease - cont asa 81. Ideally on atorva 80 but pt wants to take 40 at this time. Will need to repeat FLP. Goal LDL<70.  -     Comprehensive metabolic panel; Future; Expected date: 07/07/2017  -     Lipid panel; Future; Expected date: 07/07/2017    Osteopenia, unspecified location  -     Vitamin D; Future; Expected date: 07/07/2017    Thyroid nodule  -     US Soft Tissue Head Neck Thyroid; Future; Expected date: 07/07/2017    Tinea versicolor - tried on ketoconazole shampoo at least twice weekly but didn't help. Refer to derm  -     Ambulatory Referral to Dermatology    RTC in 6 months, sooner if  needed and depending on labs.    Elaine Mahmood MD  Department of Internal Medicine - [facility]  8:48 AM

## 2017-07-14 ENCOUNTER — OFFICE VISIT (OUTPATIENT)
Dept: PULMONOLOGY | Facility: CLINIC | Age: 62
End: 2017-07-14
Payer: COMMERCIAL

## 2017-07-14 ENCOUNTER — HOSPITAL ENCOUNTER (OUTPATIENT)
Dept: RADIOLOGY | Facility: HOSPITAL | Age: 62
Discharge: HOME | End: 2017-07-14
Attending: INTERNAL MEDICINE
Payer: COMMERCIAL

## 2017-07-14 VITALS
SYSTOLIC BLOOD PRESSURE: 158 MMHG | OXYGEN SATURATION: 98 % | HEART RATE: 60 BPM | TEMPERATURE: 98 F | HEIGHT: 63 IN | BODY MASS INDEX: 29.84 KG/M2 | DIASTOLIC BLOOD PRESSURE: 98 MMHG | WEIGHT: 168.44 LBS

## 2017-07-14 DIAGNOSIS — G47.9 SLEEP DISTURBANCE: Primary | ICD-10-CM

## 2017-07-14 DIAGNOSIS — E04.1 THYROID NODULE: ICD-10-CM

## 2017-07-14 DIAGNOSIS — R05.9 COUGH: ICD-10-CM

## 2017-07-14 DIAGNOSIS — Z87.891 FORMER SMOKER: ICD-10-CM

## 2017-07-14 DIAGNOSIS — K21.9 GASTROESOPHAGEAL REFLUX DISEASE, ESOPHAGITIS PRESENCE NOT SPECIFIED: ICD-10-CM

## 2017-07-14 PROCEDURE — 76536 US EXAM OF HEAD AND NECK: CPT | Mod: 26,,, | Performed by: RADIOLOGY

## 2017-07-14 PROCEDURE — 99204 OFFICE O/P NEW MOD 45 MIN: CPT | Mod: S$GLB,,, | Performed by: NURSE PRACTITIONER

## 2017-07-14 PROCEDURE — 99999 PR PBB SHADOW E&M-EST. PATIENT-LVL IV: CPT | Mod: PBBFAC,,, | Performed by: NURSE PRACTITIONER

## 2017-07-14 PROCEDURE — 76536 US EXAM OF HEAD AND NECK: CPT | Mod: TC

## 2017-07-14 NOTE — LETTER
July 14, 2017      Elaine Mahmood MD  [address]  [place] [address]           [facility]  [address]  [place] [address]  Phone: [phone]          Patient: Nga Livingston   MR Number: [id number]   YOB: 1955   Date of Visit: 7/14/2017       Dear Dr. Elaine Mahmood:    Thank you for referring Nga Livingston to me for evaluation. Attached you will find relevant portions of my assessment and plan of care.    If you have questions, please do not hesitate to call me. I look forward to following Nga Livingston along with you.    Sincerely,    Julita Ramírez, AUNG    Enclosure  CC:  No Recipients    If you would like to receive this communication electronically, please contact [email or web address] or [phone] to request more information on [email or web address] Link access.    For providers and/or their staff who would like to refer a patient to [facility], please contact us through our one-stop-shop provider referral line, Jason Jane, at [phone].    If you feel you have received this communication in error or would no longer like to receive these types of communications, please e-mail [email or web address]

## 2017-07-14 NOTE — PATIENT INSTRUCTIONS
Take Nexium 40 mg daily instead of every other day. Perform reflux precautions wedge pillow and no eating or drinking 2 hours prior to bedtime.   Nasal Saline:  Recipe 1/4 teaspoon of salt and 1/4 teaspoon of baking soda in distilled water.  Be sure to tilt head forward as shown in picture.Squirt into nostril 2-3 times until you taste saline in back of throat. Spit, and blow nose. Do this 4 times, alternating right and left nostril. Do this routine 2 times per day once in morning and once at night.      Gargle with warm salt water 2 times per day.   Benadryl 25mg at night (option)  Take zyrtec, or allegra, or claritin.  Nasocort 2 sprays each nostril daily.  Continue all of these things for 2 weeks.   Follow up in 2 weeks via telephone call and let us know how you are doing with your cough.  Contact prior to if any issues or concerns should arise.   Patient verbalized understanding of all information, instruction, education, recommendations provided and agrees with plan of care.

## 2017-07-14 NOTE — PROGRESS NOTES
Subjective:       Patient ID: Nga Livingston is a 61 y.o. female.    Chief Complaint: Cough    HPI  Nga Livingston is a 61 y.o. female who presents today as new patient for evaluation of cough. Her medical history includes HTN, High cholesterol, osteoperosis, GERD and depression. She reports she has had a cough for years. She initially thought it was due to Lisinopril and has since changed BP medications. The cough still exists after the change. She does have PND, and wakes up with mucous in her throat. She was recently diagnosed with GERD but is only taking Nexium every other day. She does not perform reflux precautions. She has not been on a sinus regimen to resolve PND. She has a 3 pack year smoking history and quit in 2000. She has not pets and is retired. Her recent CXR was unremarkable. She does not sleep well. Her siblings have sleep apnea. She was told she snores. She has frequent awakenings. No fever, hemoptysis, chills, SOB, or chest pain.     Review of patient's allergies indicates:   Allergen Reactions    Iodinated contrast- oral and iv dye Diarrhea and Rash     Past Medical History:   Diagnosis Date    Depression     High blood cholesterol     Hypertension     Osteoporosis 3/26/15    outside records from [facility] - T score of L2 is -2.5     Past Surgical History:   Procedure Laterality Date    TUBAL LIGATION       Family History     Problem Relation (Age of Onset)    Diabetes Sister, Maternal Uncle    Hypertension Mother    Hypothyroidism Mother, Sister, Maternal Aunt, Maternal Uncle    Stroke Mother        Social History Main Topics    Smoking status: Former Smoker     Packs/day: 0.30     Years: 10.00     Quit date: 4/7/2000    Smokeless tobacco: Never Used    Alcohol use 1.2 oz/week     2 Glasses of wine per week    Drug use: No    Sexual activity: No      Comment: Has not been sexually active x 3 years       Review of Systems   Constitutional: Positive for chills. Negative for fever.  "  HENT: Positive for postnasal drip.    Respiratory: Positive for cough. Negative for hemoptysis and wheezing.    Cardiovascular: Negative for leg swelling.   Musculoskeletal: Positive for back pain (cervical spine).   Skin: Negative for rash.   Gastrointestinal: Positive for acid reflux.   Neurological: Negative for headaches.   Hematological: Negative for adenopathy.   Psychiatric/Behavioral: Positive for sleep disturbance.       Objective:       Vitals:    07/14/17 1313 07/14/17 1315   BP: (!) 162/100 (!) 158/98   Pulse: 60    Temp: 97.5 °F (36.4 °C)    SpO2: 98%    Weight: 76.4 kg (168 lb 6.9 oz)    Height: 5' 3" (1.6 m)      Physical Exam   Constitutional: She is oriented to person, place, and time. She appears well-developed and well-nourished.   HENT:   Head: Normocephalic.   Mouth/Throat: Mallampati Score: IV.   Nasal mucosa erythematous     Neck: Normal range of motion. Neck supple.   Cardiovascular: Normal rate, regular rhythm and normal heart sounds.    No murmur heard.  Pulmonary/Chest: Normal expansion, symmetric chest wall expansion, effort normal and breath sounds normal. She has no wheezes. She has no rhonchi. She has no rales.   Abdominal: There is no guarding.   Musculoskeletal: Normal range of motion. She exhibits no edema.   Lymphadenopathy: No supraclavicular adenopathy is present.     She has no cervical adenopathy.   Neurological: She is alert and oriented to person, place, and time. Gait normal.   Skin: Skin is warm and dry.   Psychiatric: She has a normal mood and affect.   Vitals reviewed.    Personal Diagnostic Review    CXR 7/7/17 reviewed: lungs clear, hilar contours are unremarkable no effusions or pneumothorax.  Assessment:           Outpatient Encounter Prescriptions as of 7/14/2017   Medication Sig Dispense Refill    aspirin 81 MG Chew Take 81 mg by mouth once daily.      atorvastatin (LIPITOR) 80 MG tablet Take 1 tablet (80 mg total) by mouth once daily. (Patient taking " differently: Take 40 mg by mouth once daily. ) 90 tablet 3    biotin 1 mg tablet Take 1,000 mcg by mouth once daily.      carvedilol (COREG) 25 MG tablet take 1 tablet by mouth twice a day with meals 60 tablet 11    esomeprazole (NEXIUM) 40 MG capsule take 1 capsule by mouth every morning 90 capsule 3    fish oil-omega-3 fatty acids 300-1,000 mg capsule Take 2 g by mouth once daily.      losartan (COZAAR) 25 MG tablet take 1 tablet by mouth every evening 90 tablet 3    oxybutynin (DITROPAN-XL) 5 MG TR24 Take 1 tablet (5 mg total) by mouth once daily. 30 tablet 11    RESTASIS 0.05 % ophthalmic emulsion       triamcinolone (NASACORT) 55 mcg nasal inhaler 2 sprays by Nasal route once daily. 16.5 g 1    vitamin D 1000 units Tab Take 185 mg by mouth once daily.      vitamin E 100 UNIT capsule Take 100 Units by mouth once daily.       No facility-administered encounter medications on file as of 7/14/2017.      Orders Placed This Encounter   Procedures    Ambulatory consult to Sleep Disorders     Referral Priority:   Routine     Referral Type:   Consultation     Referred to Provider:   Nathaniel Pérez MD     Number of Visits Requested:   1     Problem List Items Addressed This Visit        Other    Former smoker      Other Visit Diagnoses     Sleep disturbance    -  Primary    Relevant Orders    Ambulatory consult to Sleep Disorders    Cough        Gastroesophageal reflux disease, esophagitis presence not specified              Plan:       Reviewed health history with patient.   Ambulatory consult with Dr. Pérez in sleep medicine for snoring and gasping with frequent awakenings.   Take Nexium 40 mg daily instead of every other day. Perform reflux precautions wedge pillow and no eating or drinking 2 hours prior to bedtime.   Nasal Saline:  Recipe 1/4 teaspoon of salt and 1/4 teaspoon of baking soda in distilled water.  Be sure to tilt head forward as shown in picture.Squirt into nostril 2-3 times until you taste  saline in back of throat. Spit, and blow nose. Do this 4 times, alternating right and left nostril. Do this routine 2 times per day once in morning and once at night.      Gargle with warm salt water 2 times per day.   Benadryl 25mg at night (option)  Take zyrtec, or allegra, or claritin  Nasocort 2 sprays each nostril daily  Continue all of these things for 2 weeks   Follow up in 2 weeks via telephone call and let us know how you are doing with your cough.  Contact prior to if any issues or concerns should arise.   Patient verbalized understanding of all information, instruction, education, recommendations provided and agrees with plan of care.

## 2017-07-15 ENCOUNTER — DOCUMENTATION ONLY (OUTPATIENT)
Dept: INTERNAL MEDICINE | Facility: CLINIC | Age: 62
End: 2017-07-15

## 2017-08-09 ENCOUNTER — NURSE TRIAGE (OUTPATIENT)
Dept: ADMINISTRATIVE | Facility: CLINIC | Age: 62
End: 2017-08-09

## 2017-08-09 ENCOUNTER — OFFICE VISIT (OUTPATIENT)
Dept: INTERNAL MEDICINE | Facility: CLINIC | Age: 62
End: 2017-08-09
Payer: COMMERCIAL

## 2017-08-09 VITALS
BODY MASS INDEX: 30.74 KG/M2 | SYSTOLIC BLOOD PRESSURE: 132 MMHG | HEIGHT: 63 IN | OXYGEN SATURATION: 99 % | HEART RATE: 67 BPM | DIASTOLIC BLOOD PRESSURE: 80 MMHG | WEIGHT: 173.5 LBS | TEMPERATURE: 98 F

## 2017-08-09 DIAGNOSIS — L03.116 CELLULITIS OF LEFT LEG: Primary | ICD-10-CM

## 2017-08-09 PROCEDURE — 99999 PR PBB SHADOW E&M-EST. PATIENT-LVL III: CPT | Mod: PBBFAC,,, | Performed by: INTERNAL MEDICINE

## 2017-08-09 PROCEDURE — 3075F SYST BP GE 130 - 139MM HG: CPT | Mod: S$GLB,,, | Performed by: INTERNAL MEDICINE

## 2017-08-09 PROCEDURE — 99213 OFFICE O/P EST LOW 20 MIN: CPT | Mod: S$GLB,,, | Performed by: INTERNAL MEDICINE

## 2017-08-09 PROCEDURE — 3079F DIAST BP 80-89 MM HG: CPT | Mod: S$GLB,,, | Performed by: INTERNAL MEDICINE

## 2017-08-09 PROCEDURE — 3008F BODY MASS INDEX DOCD: CPT | Mod: S$GLB,,, | Performed by: INTERNAL MEDICINE

## 2017-08-09 RX ORDER — ATORVASTATIN CALCIUM 40 MG/1
40 TABLET, FILM COATED ORAL DAILY
Refills: 0 | COMMUNITY
Start: 2017-07-31 | End: 2020-10-01 | Stop reason: SDUPTHER

## 2017-08-09 RX ORDER — SULFAMETHOXAZOLE AND TRIMETHOPRIM 800; 160 MG/1; MG/1
1 TABLET ORAL 2 TIMES DAILY
Qty: 20 TABLET | Refills: 0 | Status: SHIPPED | OUTPATIENT
Start: 2017-08-09 | End: 2017-10-03 | Stop reason: ALTCHOICE

## 2017-08-09 NOTE — PROCEDURES
"Excision of Benign Lesion  Date/Time: 8/9/2017 4:44 PM  Performed by: DUNCAN SHAFER  Authorized by: DUNCAN SHAFER     Consent Done?:  Yes (Verbal)  Time out: Immediately prior to procedure a "time out" was called to verify the correct patient, procedure, equipment, support staff and site/side marked as required.      STAFF:  Assistants?: Yes    List of assistants:  JENNIFER Kamara was present for the entire procedure.  INDICATIONS:: possible foreign body.  LOCATION:  Body area:  Upper leg / kneeleft    PREP:Position:  Supine    ANESTHESIA:  Anesthesia:  Local infiltration  Local anesthetic:  Lidocaine 1% without epinephrine    PROCEDURE DETAILS:  Excision type:  Skin  Excision size (cm):  0.3  Scalpel size:  11  Specimens?: No      Hemostasis was obtained.  Estimated blood loss (cc):  0  Sterile dressings:  Gauze  Post-op diagnosis: Same as pre-op diagnosis.      "

## 2017-08-09 NOTE — TELEPHONE ENCOUNTER
Reason for Disposition   Red or very tender (to touch) area, getting larger over 48 hours after the bite    Protocols used: ST INSECT BITE-A-OH    Patient states she has pain, redness and a black hole to area of possible insect bite.

## 2017-08-09 NOTE — PROGRESS NOTES
Subjective:       Patient ID: Nga Livingston is a 61 y.o. female.    Chief Complaint: Insect Bite (symptoms like itching, redness and edema. it appeared today.)    Believes she may have been bitten or stung by an insect      Insect Bite   This is a new problem. The current episode started today. The problem occurs constantly. The problem has been unchanged. Pertinent negatives include no abdominal pain, arthralgias, chest pain, chills, congestion, coughing, diaphoresis, fatigue, fever, headaches, myalgias, nausea, numbness, rash, sore throat, vomiting or weakness. Nothing aggravates the symptoms. She has tried nothing for the symptoms. The treatment provided no relief.     Review of Systems   Constitutional: Negative for activity change, chills, diaphoresis, fatigue and fever.   HENT: Negative for congestion, ear pain, nosebleeds, postnasal drip, sinus pressure and sore throat.    Eyes: Negative.  Negative for visual disturbance.   Respiratory: Negative for cough, chest tightness, shortness of breath and wheezing.    Cardiovascular: Negative for chest pain.   Gastrointestinal: Negative for abdominal pain, diarrhea, nausea and vomiting.   Genitourinary: Negative for difficulty urinating, dysuria, frequency and urgency.   Musculoskeletal: Negative for arthralgias, myalgias and neck stiffness.   Skin: Negative for rash.   Neurological: Negative for dizziness, weakness, numbness and headaches.   Psychiatric/Behavioral: Negative for sleep disturbance. The patient is not nervous/anxious.        Objective:      Physical Exam   Musculoskeletal:        Legs:      Assessment:       1. Cellulitis of left leg        Plan:   Nga was seen today for insect bite.    Diagnoses and all orders for this visit:    Cellulitis of left leg    Other orders  -     sulfamethoxazole-trimethoprim 800-160mg (BACTRIM DS) 800-160 mg Tab; Take 1 tablet by mouth 2 (two) times daily.

## 2017-08-25 DIAGNOSIS — R35.0 URINARY FREQUENCY: ICD-10-CM

## 2017-08-27 RX ORDER — OXYBUTYNIN CHLORIDE 5 MG/1
TABLET, EXTENDED RELEASE ORAL
Qty: 30 TABLET | Refills: 11 | Status: SHIPPED | OUTPATIENT
Start: 2017-08-27 | End: 2018-09-04 | Stop reason: SDUPTHER

## 2017-09-01 ENCOUNTER — TELEPHONE (OUTPATIENT)
Dept: PULMONOLOGY | Facility: CLINIC | Age: 62
End: 2017-09-01

## 2017-09-01 NOTE — TELEPHONE ENCOUNTER
Patient called to let you know that she is doing much better. Cough not completely gone but improved. Advised patient to continue with recommended treatment.

## 2017-09-01 NOTE — TELEPHONE ENCOUNTER
----- Message from Gabriella Chong sent at 9/1/2017  3:08 PM CDT -----  Contact: Self   [phone]  Desiree   -  Pt calling  To speak with the Dr in regards to her health. Pt stated that she is feeling much better  Call back number [phone]  Thanks,

## 2017-09-15 ENCOUNTER — INITIAL CONSULT (OUTPATIENT)
Dept: DERMATOLOGY | Facility: CLINIC | Age: 62
End: 2017-09-15
Payer: COMMERCIAL

## 2017-09-15 DIAGNOSIS — L98.9 DISEASE OF SKIN AND SUBCUTANEOUS TISSUE: Primary | ICD-10-CM

## 2017-09-15 DIAGNOSIS — B35.1 ONYCHOMYCOSIS: ICD-10-CM

## 2017-09-15 DIAGNOSIS — B35.3 TINEA PEDIS OF BOTH FEET: ICD-10-CM

## 2017-09-15 PROCEDURE — 88312 SPECIAL STAINS GROUP 1: CPT | Mod: 26,,, | Performed by: PATHOLOGY

## 2017-09-15 PROCEDURE — 11100 PR BIOPSY OF SKIN LESION: CPT | Mod: S$GLB,,, | Performed by: DERMATOLOGY

## 2017-09-15 PROCEDURE — 99203 OFFICE O/P NEW LOW 30 MIN: CPT | Mod: 25,S$GLB,, | Performed by: DERMATOLOGY

## 2017-09-15 PROCEDURE — 99999 PR PBB SHADOW E&M-EST. PATIENT-LVL II: CPT | Mod: PBBFAC,,, | Performed by: DERMATOLOGY

## 2017-09-15 PROCEDURE — 88342 IMHCHEM/IMCYTCHM 1ST ANTB: CPT | Mod: 26,,, | Performed by: PATHOLOGY

## 2017-09-15 PROCEDURE — 88305 TISSUE EXAM BY PATHOLOGIST: CPT | Performed by: PATHOLOGY

## 2017-09-15 PROCEDURE — 88341 IMHCHEM/IMCYTCHM EA ADD ANTB: CPT | Mod: 26,,, | Performed by: PATHOLOGY

## 2017-09-15 PROCEDURE — 3008F BODY MASS INDEX DOCD: CPT | Mod: S$GLB,,, | Performed by: DERMATOLOGY

## 2017-09-15 RX ORDER — CICLOPIROX 80 MG/ML
SOLUTION TOPICAL
Qty: 1 BOTTLE | Refills: 5 | Status: SHIPPED | OUTPATIENT
Start: 2017-09-15 | End: 2019-06-17

## 2017-09-15 RX ORDER — ECONAZOLE NITRATE 10 MG/G
CREAM TOPICAL 2 TIMES DAILY
Qty: 85 G | Refills: 2 | Status: SHIPPED | OUTPATIENT
Start: 2017-09-15 | End: 2019-06-17

## 2017-09-15 NOTE — LETTER
September 15, 2017      Elaine Mahmood MD  [address]  [place] [address]           [facility]  [address]  [place] [address]  Phone: [phone]  Fax: [phone]          Patient: Nga Livingston   MR Number: [id number]   YOB: 1955   Date of Visit: 9/15/2017       Dear Dr. Elaine Mahmood:    Thank you for referring Nga Livingston to me for evaluation. Attached you will find relevant portions of my assessment and plan of care.    If you have questions, please do not hesitate to call me. I look forward to following Nga Livingston along with you.    Sincerely,    Jesi Sanchez MD    Enclosure  CC:  No Recipients    If you would like to receive this communication electronically, please contact [email or web address] or [phone] to request more information on [facility] access.    For providers and/or their staff who would like to refer a patient to [facility], please contact us through our one-stop-shop provider referral line, [facility], at [phone].    If you feel you have received this communication in error or would no longer like to receive these types of communications, please e-mail [email or web address]

## 2017-09-15 NOTE — PROGRESS NOTES
"  Subjective:       Patient ID:  Nga Livingston is a 61 y.o. female who presents for   Chief Complaint   Patient presents with    Rash     Rash  - Initial  Affected locations: all over body.  Duration: 1 year  Signs and Symptoms: white in color.  Severity: mild to moderate  Timing: constant  Aggravated by: nothing  Treatments tried: ketoconazole shamppo.  Improvement on treatment: no relief      HPI: 60 yo AAF here for initial evaluation, c/o "white spots" all over her body, worse on arms and face, present for at least a year. The rash is asymptomatic except for occasional pruritus of her back, which she thinks may not be related. Has been bathing with ketoconazole shampoo twice daily (applying for 10 minutes before rinsing), without improvement. Moisturizing with Olay. Also c/o fungus on B great toenails and occasional itching of her feet x months. No current treatment.    Derm history: + history of possible atopic dermatitis  Pt also states she had UV treatment done at [facility] when she was a child, but unsure which condition was being treated.    Review of Systems   Constitutional: Negative for fever, chills and weight loss.   Skin: Positive for rash.   Hematologic/Lymphatic:        On ASA   Allergic/Immunologic:        Seasonal allergies        Objective:    Physical Exam   Constitutional: She appears well-developed and well-nourished.   Neurological: She is alert.   Psychiatric: She has a normal mood and affect.   Skin:   Areas Examined (abnormalities noted in diagram):   Head / Face Inspection Performed  Neck Inspection Performed  Chest / Axilla Inspection Performed  Abdomen Inspection Performed  Genitals / Buttocks / Groin Inspection Performed  Back Inspection Performed  RUE Inspected  LUE Inspection Performed  RLE Inspected  LLE Inspection Performed  Nails and Digits Inspection Performed                       Diagram Legend     Erythematous scaling macule/papule c/w actinic keratosis       " Vascular papule c/w angioma      Pigmented verrucoid papule/plaque c/w seborrheic keratosis      Yellow umbilicated papule c/w sebaceous hyperplasia      Irregularly shaped tan macule c/w lentigo     1-2 mm smooth white papules consistent with Milia      Movable subcutaneous cyst with punctum c/w epidermal inclusion cyst      Subcutaneous movable cyst c/w pilar cyst      Firm pink to brown papule c/w dermatofibroma      Pedunculated fleshy papule(s) c/w skin tag(s)      Evenly pigmented macule c/w junctional nevus     Mildly variegated pigmented, slightly irregular-bordered macule c/w mildly atypical nevus      Flesh colored to evenly pigmented papule c/w intradermal nevus       Pink pearly papule/plaque c/w basal cell carcinoma      Erythematous hyperkeratotic cursted plaque c/w SCC      Surgical scar with no sign of skin cancer recurrence      Open and closed comedones      Inflammatory papules and pustules      Verrucoid papule consistent consistent with wart     Erythematous eczematous patches and plaques     Dystrophic onycholytic nail with subungual debris c/w onychomycosis     Umbilicated papule    Erythematous-base heme-crusted tan verrucoid plaque consistent with inflamed seborrheic keratosis     Erythematous Silvery Scaling Plaque c/w Psoriasis     See annotation      Assessment / Plan:      Disease of skin and subcutaneous tissue  Punch biopsy procedure note:  Punch biopsy performed after verbal consent obtained. Area marked and prepped with alcohol. Approximately 1cc of 1% lidocaine with epinephrine injected. 4 mm disposable punch used to remove lesion. Hemostasis obtained and biopsy site closed with 1 - 2 Prolene sutures. Wound care instructions reviewed with patient and handout given.    -     Tissue Specimen To Pathology, Dermatology  Pathology Orders:      Normal Orders This Visit    Tissue Specimen To Pathology, Dermatology     Questions:    Directional Terms:  Other(comment)    Clinical  information:  r/o CTCL vs. tinea versicolor vs. vitiligo vs. other Comment - punch    Specific Site:  R upper back          Tinea pedis of both feet  -     econazole nitrate 1 % cream; Apply topically 2 (two) times daily. X 4 wks to bottoms and sides of feet  Dispense: 85 g; Refill: 2    Onychomycosis  -     ciclopirox (PENLAC) 8 % Soln; Apply daily to affected nail. Must remove with rubbing alcohol once weekly and then re-start.  Dispense: 1 Bottle; Refill: 5      Return in about 2 weeks (around 9/29/2017) for suture removal. and biopsy results

## 2017-09-26 DIAGNOSIS — I10 BENIGN ESSENTIAL HYPERTENSION: ICD-10-CM

## 2017-09-26 RX ORDER — CARVEDILOL 25 MG/1
TABLET ORAL
Qty: 60 TABLET | Refills: 11 | Status: SHIPPED | OUTPATIENT
Start: 2017-09-26 | End: 2019-01-21 | Stop reason: SDUPTHER

## 2017-09-29 ENCOUNTER — OFFICE VISIT (OUTPATIENT)
Dept: DERMATOLOGY | Facility: CLINIC | Age: 62
End: 2017-09-29
Payer: COMMERCIAL

## 2017-09-29 DIAGNOSIS — L80 VITILIGO: Primary | ICD-10-CM

## 2017-09-29 PROCEDURE — 99213 OFFICE O/P EST LOW 20 MIN: CPT | Mod: S$GLB,,, | Performed by: DERMATOLOGY

## 2017-09-29 PROCEDURE — 99999 PR PBB SHADOW E&M-EST. PATIENT-LVL II: CPT | Mod: PBBFAC,,, | Performed by: DERMATOLOGY

## 2017-09-29 PROCEDURE — 3008F BODY MASS INDEX DOCD: CPT | Mod: S$GLB,,, | Performed by: DERMATOLOGY

## 2017-09-29 RX ORDER — PIMECROLIMUS 10 MG/G
CREAM TOPICAL
Qty: 100 G | Refills: 3 | Status: SHIPPED | OUTPATIENT
Start: 2017-09-29 | End: 2017-10-03 | Stop reason: ALTCHOICE

## 2017-09-29 NOTE — PROGRESS NOTES
Subjective:       Patient ID:  Nga Livingston is a 61 y.o. female who presents for   Chief Complaint   Patient presents with    Results     HPI  Pt here today for suture removal and biopsy results.  Has been developing white spots on skin over the past year, asymptomatic, no improvement with ketoconazole shampoo.    Derm history: + history of possible atopic dermatitis  Pt also states she had UV treatment done at [facility] when she was a child, but unsure which condition was being treated although she knows her skin was scaly back then.    Review of Systems   Constitutional: Negative for fever and chills.   Skin: Negative for itching and rash.        Objective:    Physical Exam   Constitutional: She appears well-developed and well-nourished.   Neurological: She is alert.   Psychiatric: She has a normal mood and affect.   Skin:   Areas Examined (abnormalities noted in diagram):   Head / Face Inspection Performed  Neck Inspection Performed  Chest / Axilla Inspection Performed  Abdomen Inspection Performed  Genitals / Buttocks / Groin Inspection Performed  Back Inspection Performed  RUE Inspected  LUE Inspection Performed  RLE Inspected  LLE Inspection Performed  Nails and Digits Inspection Performed                   Diagram Legend     Erythematous scaling macule/papule c/w actinic keratosis       Vascular papule c/w angioma      Pigmented verrucoid papule/plaque c/w seborrheic keratosis      Yellow umbilicated papule c/w sebaceous hyperplasia      Irregularly shaped tan macule c/w lentigo     1-2 mm smooth white papules consistent with Milia      Movable subcutaneous cyst with punctum c/w epidermal inclusion cyst      Subcutaneous movable cyst c/w pilar cyst      Firm pink to brown papule c/w dermatofibroma      Pedunculated fleshy papule(s) c/w skin tag(s)      Evenly pigmented macule c/w junctional nevus     Mildly variegated pigmented, slightly irregular-bordered macule c/w mildly atypical nevus       Flesh colored to evenly pigmented papule c/w intradermal nevus       Pink pearly papule/plaque c/w basal cell carcinoma      Erythematous hyperkeratotic cursted plaque c/w SCC      Surgical scar with no sign of skin cancer recurrence      Open and closed comedones      Inflammatory papules and pustules      Verrucoid papule consistent consistent with wart     Erythematous eczematous patches and plaques     Dystrophic onycholytic nail with subungual debris c/w onychomycosis     Umbilicated papule    Erythematous-base heme-crusted tan verrucoid plaque consistent with inflamed seborrheic keratosis     Erythematous Silvery Scaling Plaque c/w Psoriasis     See annotation      Assessment / Plan:       FINAL PATHOLOGIC DIAGNOSIS  1. Skin, right upper back, punch biopsy:  - FEATURES CONSISTENT WITH VITILIGO.    Vitiligo  -     ELIDEL 1 % cream; AAA bid  Dispense: 100 g; Refill: 3  -     NB-UVB Light Therapy; Standing  TSH, glucose WNL. Pt has anemia which is currently being worked up by PCP.    Brochure provided.    rtc 1 month after starting NBUVB

## 2017-10-03 ENCOUNTER — OFFICE VISIT (OUTPATIENT)
Dept: ENDOCRINOLOGY | Facility: CLINIC | Age: 62
End: 2017-10-03
Payer: COMMERCIAL

## 2017-10-03 ENCOUNTER — TELEPHONE (OUTPATIENT)
Dept: DERMATOLOGY | Facility: CLINIC | Age: 62
End: 2017-10-03

## 2017-10-03 ENCOUNTER — LAB VISIT (OUTPATIENT)
Dept: LAB | Facility: HOSPITAL | Age: 62
End: 2017-10-03
Attending: INTERNAL MEDICINE
Payer: COMMERCIAL

## 2017-10-03 VITALS
DIASTOLIC BLOOD PRESSURE: 80 MMHG | SYSTOLIC BLOOD PRESSURE: 135 MMHG | HEIGHT: 63 IN | BODY MASS INDEX: 29.77 KG/M2 | WEIGHT: 168 LBS | HEART RATE: 62 BPM

## 2017-10-03 DIAGNOSIS — E21.1 HYPERPARATHYROIDISM , SECONDARY, NON-RENAL: ICD-10-CM

## 2017-10-03 DIAGNOSIS — E21.1 HYPERPARATHYROIDISM , SECONDARY, NON-RENAL: Primary | ICD-10-CM

## 2017-10-03 LAB
25(OH)D3+25(OH)D2 SERPL-MCNC: 40 NG/ML
PTH-INTACT SERPL-MCNC: 45 PG/ML

## 2017-10-03 PROCEDURE — 99999 PR PBB SHADOW E&M-EST. PATIENT-LVL III: CPT | Mod: PBBFAC,,, | Performed by: INTERNAL MEDICINE

## 2017-10-03 PROCEDURE — 99214 OFFICE O/P EST MOD 30 MIN: CPT | Mod: S$GLB,,, | Performed by: INTERNAL MEDICINE

## 2017-10-03 PROCEDURE — 83970 ASSAY OF PARATHORMONE: CPT

## 2017-10-03 PROCEDURE — 82306 VITAMIN D 25 HYDROXY: CPT

## 2017-10-03 PROCEDURE — 36415 COLL VENOUS BLD VENIPUNCTURE: CPT

## 2017-10-03 RX ORDER — DIPHENHYDRAMINE HCL 25 MG
25 CAPSULE ORAL NIGHTLY
COMMUNITY

## 2017-10-03 RX ORDER — CETIRIZINE HYDROCHLORIDE 10 MG/1
10 TABLET ORAL DAILY
COMMUNITY

## 2017-10-03 NOTE — PROGRESS NOTES
Subjective:       Patient ID: Nga Livingston is a 61 y.o. female.    Chief Complaint: Hyperparathyroidism    Ms. Livingston is a 59 yo lady presenting for f/u of her recent lab work.  She reports no acute complaints today.  Since seeing us last time she reports having no major changes in medical history.  She denies bone disease, fractures or renal stones.  She takes vit D 1000 units every other day.  BMD shows osteopenia(March, 2017)      Review of Systems   Constitutional: Negative for chills and fever.   HENT: Negative for ear discharge, hearing loss, mouth sores, nosebleeds and sore throat.    Eyes: Negative for photophobia, pain, discharge and visual disturbance.   Respiratory: Negative for cough, choking, chest tightness and shortness of breath.    Cardiovascular: Negative for chest pain and palpitations.   Gastrointestinal: Negative for abdominal distention, abdominal pain, anal bleeding, blood in stool, constipation, diarrhea, nausea, rectal pain and vomiting.   Endocrine: Negative for cold intolerance, heat intolerance, polydipsia and polyuria.   Genitourinary: Negative for difficulty urinating, dysuria and hematuria.   Musculoskeletal: Negative for arthralgias, back pain, joint swelling and myalgias.   Skin: Negative for rash and wound.   Neurological: Negative for dizziness, light-headedness and numbness.       Objective:      Physical Exam   Constitutional: She is oriented to person, place, and time. She appears well-developed. No distress.   HENT:   Head: Normocephalic and atraumatic.   Mouth/Throat: Oropharynx is clear and moist. No oropharyngeal exudate.   Eyes: EOM are normal. Pupils are equal, round, and reactive to light. No scleral icterus.   Neck: Normal range of motion. Neck supple. No JVD present. No thyromegaly present.   Cardiovascular: Normal rate, regular rhythm, normal heart sounds and intact distal pulses.  Exam reveals no gallop and no friction rub.    No murmur heard.  Pulmonary/Chest:  Effort normal and breath sounds normal. No respiratory distress. She has no wheezes. She has no rales.   Abdominal: Soft. Bowel sounds are normal. She exhibits no distension. There is no tenderness.   Musculoskeletal: Normal range of motion. She exhibits no edema or deformity.   Neurological: She is alert and oriented to person, place, and time. No cranial nerve deficit.   Skin: Skin is warm and dry. She is not diaphoretic.   Psychiatric: She has a normal mood and affect. Her behavior is normal.       Assessment:       No diagnosis found.    Plan:       1. Hyperparathyroidism:  -Most likley 2/2 to chronic Vitamin D defeciency  -Continue current regiement  -Repeat CMP, PTH, Vit D, & Phos next year  -Return to clinic in one year    2. Osteoporosis:  -No acute issues  -She no longer takes Fosomax   Check labs today inc Vit D.

## 2017-10-03 NOTE — TELEPHONE ENCOUNTER
----- Message from Jesi Sanchez MD sent at 9/29/2017  9:49 AM CDT -----  Please schedule pt for NBUVB for vitiligo.

## 2017-10-04 ENCOUNTER — CLINICAL SUPPORT (OUTPATIENT)
Dept: DERMATOLOGY | Facility: CLINIC | Age: 62
End: 2017-10-04
Payer: COMMERCIAL

## 2017-10-04 DIAGNOSIS — L80 VITILIGO: ICD-10-CM

## 2017-10-04 PROCEDURE — 96900 ACTINOTHERAPY UV LIGHT: CPT | Mod: S$GLB,,, | Performed by: DERMATOLOGY

## 2017-10-04 PROCEDURE — 99999 PR PBB SHADOW E&M-EST. PATIENT-LVL II: CPT | Mod: PBBFAC,,,

## 2017-10-04 NOTE — PROGRESS NOTES
Light tx 1 for vitiligo. Goggles issued and unit safety discussed. NB-UVB to body at 200 mj with eyes shielded.

## 2017-10-10 ENCOUNTER — CLINICAL SUPPORT (OUTPATIENT)
Dept: DERMATOLOGY | Facility: CLINIC | Age: 62
End: 2017-10-10
Payer: COMMERCIAL

## 2017-10-10 DIAGNOSIS — L80 VITILIGO: ICD-10-CM

## 2017-10-10 PROCEDURE — 96900 ACTINOTHERAPY UV LIGHT: CPT | Mod: S$GLB,,, | Performed by: DERMATOLOGY

## 2017-10-10 PROCEDURE — 99999 PR PBB SHADOW E&M-EST. PATIENT-LVL II: CPT | Mod: PBBFAC,,,

## 2017-10-10 NOTE — PROGRESS NOTES
Light tx 2 for vitiligo. NB-UVB to body at 250 mj with eyes shielded. Denied any erythema with last dose.

## 2017-10-12 ENCOUNTER — CLINICAL SUPPORT (OUTPATIENT)
Dept: DERMATOLOGY | Facility: CLINIC | Age: 62
End: 2017-10-12
Payer: COMMERCIAL

## 2017-10-12 DIAGNOSIS — L80 VITILIGO: ICD-10-CM

## 2017-10-12 PROCEDURE — 96900 ACTINOTHERAPY UV LIGHT: CPT | Mod: S$GLB,,, | Performed by: DERMATOLOGY

## 2017-10-12 PROCEDURE — 99999 PR PBB SHADOW E&M-EST. PATIENT-LVL II: CPT | Mod: PBBFAC,,,

## 2017-10-12 NOTE — PROGRESS NOTES
Light tx 3 for vitiligo. NB-UVB to body at 300 mj with eyes shielded. No erythema with last dose.

## 2017-10-17 ENCOUNTER — CLINICAL SUPPORT (OUTPATIENT)
Dept: DERMATOLOGY | Facility: CLINIC | Age: 62
End: 2017-10-17
Payer: COMMERCIAL

## 2017-10-17 DIAGNOSIS — L80 VITILIGO: ICD-10-CM

## 2017-10-17 PROCEDURE — 99999 PR PBB SHADOW E&M-EST. PATIENT-LVL II: CPT | Mod: PBBFAC,,,

## 2017-10-17 PROCEDURE — 96900 ACTINOTHERAPY UV LIGHT: CPT | Mod: S$GLB,,, | Performed by: DERMATOLOGY

## 2017-10-17 NOTE — PROGRESS NOTES
Light tx 4 for vitiligo. No erythema with last dose. NB-UVB to body at 350 mj with eyes shielded.

## 2017-10-18 ENCOUNTER — TELEPHONE (OUTPATIENT)
Dept: DERMATOLOGY | Facility: CLINIC | Age: 62
End: 2017-10-18

## 2017-10-18 NOTE — TELEPHONE ENCOUNTER
----- Message from Elmira Encinas sent at 10/17/2017  2:58 PM CDT -----  Contact: pt   [facility]-pt- pt is calling to speak with the nurse pt was prescribed a cream for her pt said Dr Sanchez was going to check the price pt said she went to the pharmacy and they didn't have it pt is asking if Dr Sanchez  called in the prescription. Can you please call pt at [phone]    GAEL

## 2017-10-19 ENCOUNTER — CLINICAL SUPPORT (OUTPATIENT)
Dept: DERMATOLOGY | Facility: CLINIC | Age: 62
End: 2017-10-19
Payer: COMMERCIAL

## 2017-10-19 DIAGNOSIS — L80 VITILIGO: ICD-10-CM

## 2017-10-19 PROCEDURE — 99999 PR PBB SHADOW E&M-EST. PATIENT-LVL II: CPT | Mod: PBBFAC,,,

## 2017-10-19 PROCEDURE — 96900 ACTINOTHERAPY UV LIGHT: CPT | Mod: S$GLB,,, | Performed by: DERMATOLOGY

## 2017-10-31 ENCOUNTER — CLINICAL SUPPORT (OUTPATIENT)
Dept: DERMATOLOGY | Facility: CLINIC | Age: 62
End: 2017-10-31
Payer: COMMERCIAL

## 2017-10-31 DIAGNOSIS — L80 VITILIGO: ICD-10-CM

## 2017-10-31 PROCEDURE — 96900 ACTINOTHERAPY UV LIGHT: CPT | Mod: S$GLB,,, | Performed by: DERMATOLOGY

## 2017-10-31 PROCEDURE — 99999 PR PBB SHADOW E&M-EST. PATIENT-LVL II: CPT | Mod: PBBFAC,,,

## 2017-11-02 ENCOUNTER — CLINICAL SUPPORT (OUTPATIENT)
Dept: DERMATOLOGY | Facility: CLINIC | Age: 62
End: 2017-11-02
Payer: COMMERCIAL

## 2017-11-02 DIAGNOSIS — L80 VITILIGO: ICD-10-CM

## 2017-11-02 PROCEDURE — 99999 PR PBB SHADOW E&M-EST. PATIENT-LVL II: CPT | Mod: PBBFAC,,,

## 2017-11-02 PROCEDURE — 96900 ACTINOTHERAPY UV LIGHT: CPT | Mod: S$GLB,,, | Performed by: DERMATOLOGY

## 2017-11-07 ENCOUNTER — CLINICAL SUPPORT (OUTPATIENT)
Dept: DERMATOLOGY | Facility: CLINIC | Age: 62
End: 2017-11-07
Payer: COMMERCIAL

## 2017-11-07 DIAGNOSIS — L80 VITILIGO: ICD-10-CM

## 2017-11-07 PROCEDURE — 99999 PR PBB SHADOW E&M-EST. PATIENT-LVL II: CPT | Mod: PBBFAC,,,

## 2017-11-07 PROCEDURE — 96900 ACTINOTHERAPY UV LIGHT: CPT | Mod: S$GLB,,, | Performed by: DERMATOLOGY

## 2017-11-07 NOTE — PROGRESS NOTES
Light tx 8 for vitiligo. NB-UVB to body at 500 mj with eyes shielded. No erythema with last dose.

## 2017-11-09 ENCOUNTER — CLINICAL SUPPORT (OUTPATIENT)
Dept: DERMATOLOGY | Facility: CLINIC | Age: 62
End: 2017-11-09
Payer: COMMERCIAL

## 2017-11-09 DIAGNOSIS — L80 VITILIGO: ICD-10-CM

## 2017-11-09 PROCEDURE — 99999 PR PBB SHADOW E&M-EST. PATIENT-LVL II: CPT | Mod: PBBFAC,,,

## 2017-11-09 PROCEDURE — 96900 ACTINOTHERAPY UV LIGHT: CPT | Mod: S$GLB,,, | Performed by: DERMATOLOGY

## 2017-11-09 NOTE — PROGRESS NOTES
Light tx 9 for vitiligo. NB-UVB to body at 550 mj with eyes shielded. No erythema with last dose.

## 2017-11-14 ENCOUNTER — CLINICAL SUPPORT (OUTPATIENT)
Dept: DERMATOLOGY | Facility: CLINIC | Age: 62
End: 2017-11-14
Payer: COMMERCIAL

## 2017-11-14 DIAGNOSIS — L80 VITILIGO: ICD-10-CM

## 2017-11-14 PROCEDURE — 96900 ACTINOTHERAPY UV LIGHT: CPT | Mod: S$GLB,,, | Performed by: DERMATOLOGY

## 2017-11-14 PROCEDURE — 99999 PR PBB SHADOW E&M-EST. PATIENT-LVL II: CPT | Mod: PBBFAC,,,

## 2017-11-14 NOTE — PROGRESS NOTES
Light tx 10 for vitiligo. NB-UVB to body at 600 mj with eyes shielded. No erythema with last dose.

## 2017-11-16 ENCOUNTER — CLINICAL SUPPORT (OUTPATIENT)
Dept: DERMATOLOGY | Facility: CLINIC | Age: 62
End: 2017-11-16
Payer: COMMERCIAL

## 2017-11-16 DIAGNOSIS — L80 VITILIGO: ICD-10-CM

## 2017-11-16 PROCEDURE — 99999 PR PBB SHADOW E&M-EST. PATIENT-LVL II: CPT | Mod: PBBFAC,,,

## 2017-11-16 PROCEDURE — 96900 ACTINOTHERAPY UV LIGHT: CPT | Mod: S$GLB,,, | Performed by: DERMATOLOGY

## 2017-11-16 NOTE — PROGRESS NOTES
Light tx 11 for vitiligo. NB-UVB to body at 650 mj with eyes shielded. No erythema with last dose.

## 2017-11-17 ENCOUNTER — HOSPITAL ENCOUNTER (OUTPATIENT)
Dept: RADIOLOGY | Facility: HOSPITAL | Age: 62
Discharge: HOME | End: 2017-11-17
Attending: OBSTETRICS & GYNECOLOGY
Payer: COMMERCIAL

## 2017-11-17 DIAGNOSIS — Z12.31 ENCOUNTER FOR SCREENING MAMMOGRAM FOR HIGH-RISK PATIENT: ICD-10-CM

## 2017-11-17 PROCEDURE — 77067 SCR MAMMO BI INCL CAD: CPT | Mod: 26,,, | Performed by: RADIOLOGY

## 2017-11-17 PROCEDURE — 77067 SCR MAMMO BI INCL CAD: CPT | Mod: TC

## 2017-11-17 PROCEDURE — 77063 BREAST TOMOSYNTHESIS BI: CPT | Mod: 26,,, | Performed by: RADIOLOGY

## 2017-11-22 ENCOUNTER — CLINICAL SUPPORT (OUTPATIENT)
Dept: DERMATOLOGY | Facility: CLINIC | Age: 62
End: 2017-11-22
Payer: COMMERCIAL

## 2017-11-22 DIAGNOSIS — L80 VITILIGO: ICD-10-CM

## 2017-11-22 PROCEDURE — 99999 PR PBB SHADOW E&M-EST. PATIENT-LVL II: CPT | Mod: PBBFAC,,,

## 2017-11-22 PROCEDURE — 96900 ACTINOTHERAPY UV LIGHT: CPT | Mod: S$GLB,,, | Performed by: DERMATOLOGY

## 2017-12-05 ENCOUNTER — CLINICAL SUPPORT (OUTPATIENT)
Dept: DERMATOLOGY | Facility: CLINIC | Age: 62
End: 2017-12-05
Payer: COMMERCIAL

## 2017-12-05 DIAGNOSIS — L80 VITILIGO: ICD-10-CM

## 2017-12-05 PROCEDURE — 99999 PR PBB SHADOW E&M-EST. PATIENT-LVL II: CPT | Mod: PBBFAC,,,

## 2017-12-05 PROCEDURE — 96900 ACTINOTHERAPY UV LIGHT: CPT | Mod: S$GLB,,, | Performed by: DERMATOLOGY

## 2017-12-07 ENCOUNTER — CLINICAL SUPPORT (OUTPATIENT)
Dept: DERMATOLOGY | Facility: CLINIC | Age: 62
End: 2017-12-07
Payer: COMMERCIAL

## 2017-12-07 DIAGNOSIS — L80 VITILIGO: ICD-10-CM

## 2017-12-07 PROCEDURE — 96900 ACTINOTHERAPY UV LIGHT: CPT | Mod: S$GLB,,, | Performed by: DERMATOLOGY

## 2017-12-07 PROCEDURE — 99999 PR PBB SHADOW E&M-EST. PATIENT-LVL II: CPT | Mod: PBBFAC,,,

## 2017-12-11 RX ORDER — ESOMEPRAZOLE MAGNESIUM 40 MG/1
CAPSULE, DELAYED RELEASE ORAL
Qty: 90 CAPSULE | Refills: 3 | Status: SHIPPED | OUTPATIENT
Start: 2017-12-11 | End: 2018-06-22

## 2017-12-12 ENCOUNTER — CLINICAL SUPPORT (OUTPATIENT)
Dept: DERMATOLOGY | Facility: CLINIC | Age: 62
End: 2017-12-12
Payer: COMMERCIAL

## 2017-12-12 DIAGNOSIS — L80 VITILIGO: ICD-10-CM

## 2017-12-12 PROCEDURE — 96900 ACTINOTHERAPY UV LIGHT: CPT | Mod: S$GLB,,, | Performed by: DERMATOLOGY

## 2017-12-12 PROCEDURE — 99999 PR PBB SHADOW E&M-EST. PATIENT-LVL II: CPT | Mod: PBBFAC,,,

## 2017-12-14 ENCOUNTER — CLINICAL SUPPORT (OUTPATIENT)
Dept: DERMATOLOGY | Facility: CLINIC | Age: 62
End: 2017-12-14
Payer: COMMERCIAL

## 2017-12-14 DIAGNOSIS — L80 VITILIGO: ICD-10-CM

## 2017-12-14 PROCEDURE — 96900 ACTINOTHERAPY UV LIGHT: CPT | Mod: S$GLB,,, | Performed by: DERMATOLOGY

## 2017-12-14 PROCEDURE — 99999 PR PBB SHADOW E&M-EST. PATIENT-LVL II: CPT | Mod: PBBFAC,,,

## 2017-12-14 NOTE — PROGRESS NOTES
Light tx 16 for vitiligo. NB-UVB to body at 850 mj with last dose. No erythema with last dose voiced by patient.

## 2017-12-19 ENCOUNTER — CLINICAL SUPPORT (OUTPATIENT)
Dept: DERMATOLOGY | Facility: CLINIC | Age: 62
End: 2017-12-19
Payer: COMMERCIAL

## 2017-12-19 DIAGNOSIS — L80 VITILIGO: ICD-10-CM

## 2017-12-19 PROCEDURE — 96900 ACTINOTHERAPY UV LIGHT: CPT | Mod: S$GLB,,, | Performed by: DERMATOLOGY

## 2017-12-19 PROCEDURE — 99999 PR PBB SHADOW E&M-EST. PATIENT-LVL II: CPT | Mod: PBBFAC,,,

## 2017-12-21 ENCOUNTER — CLINICAL SUPPORT (OUTPATIENT)
Dept: DERMATOLOGY | Facility: CLINIC | Age: 62
End: 2017-12-21
Payer: COMMERCIAL

## 2017-12-21 DIAGNOSIS — L80 VITILIGO: ICD-10-CM

## 2017-12-21 PROCEDURE — 99999 PR PBB SHADOW E&M-EST. PATIENT-LVL II: CPT | Mod: PBBFAC,,,

## 2017-12-21 PROCEDURE — 96900 ACTINOTHERAPY UV LIGHT: CPT | Mod: S$GLB,,, | Performed by: DERMATOLOGY

## 2017-12-22 ENCOUNTER — TELEPHONE (OUTPATIENT)
Dept: DERMATOLOGY | Facility: CLINIC | Age: 62
End: 2017-12-22

## 2017-12-22 DIAGNOSIS — L80 VITILIGO: Primary | ICD-10-CM

## 2017-12-23 NOTE — TELEPHONE ENCOUNTER
----- Message from Miya Joyner sent at 12/21/2017  8:37 AM CST -----  Please add more NB-UVB standing  treatments for this patient. Thanks!!!

## 2017-12-28 ENCOUNTER — CLINICAL SUPPORT (OUTPATIENT)
Dept: DERMATOLOGY | Facility: CLINIC | Age: 62
End: 2017-12-28
Payer: COMMERCIAL

## 2017-12-28 DIAGNOSIS — L80 VITILIGO: ICD-10-CM

## 2017-12-28 PROCEDURE — 99999 PR PBB SHADOW E&M-EST. PATIENT-LVL II: CPT | Mod: PBBFAC,,,

## 2017-12-28 PROCEDURE — 96900 ACTINOTHERAPY UV LIGHT: CPT | Mod: S$GLB,,, | Performed by: DERMATOLOGY

## 2017-12-28 NOTE — PROGRESS NOTES
Light tx 19 for vitiligo. NB-UVB to body at 1000 mj with eyes shielded. No erythema with last dose.

## 2018-01-31 ENCOUNTER — TELEPHONE (OUTPATIENT)
Dept: DERMATOLOGY | Facility: CLINIC | Age: 63
End: 2018-01-31

## 2018-01-31 NOTE — TELEPHONE ENCOUNTER
----- Message from Suzie Duncan sent at 1/30/2018 10:41 AM CST -----  Contact: patient   Please call above patient need to schedule light treatment

## 2018-02-02 ENCOUNTER — OFFICE VISIT (OUTPATIENT)
Dept: INTERNAL MEDICINE | Facility: CLINIC | Age: 63
End: 2018-02-02
Payer: COMMERCIAL

## 2018-02-02 ENCOUNTER — HOSPITAL ENCOUNTER (OUTPATIENT)
Dept: RADIOLOGY | Facility: HOSPITAL | Age: 63
Discharge: HOME | End: 2018-02-02
Attending: INTERNAL MEDICINE
Payer: COMMERCIAL

## 2018-02-02 VITALS
OXYGEN SATURATION: 98 % | BODY MASS INDEX: 29.23 KG/M2 | SYSTOLIC BLOOD PRESSURE: 132 MMHG | HEIGHT: 63 IN | WEIGHT: 165 LBS | DIASTOLIC BLOOD PRESSURE: 85 MMHG | HEART RATE: 67 BPM

## 2018-02-02 DIAGNOSIS — I10 BENIGN ESSENTIAL HYPERTENSION: Chronic | ICD-10-CM

## 2018-02-02 DIAGNOSIS — K22.89 ESOPHAGEAL PAIN: ICD-10-CM

## 2018-02-02 DIAGNOSIS — D64.9 NORMOCYTIC ANEMIA: ICD-10-CM

## 2018-02-02 DIAGNOSIS — K22.89 ESOPHAGEAL PAIN: Primary | ICD-10-CM

## 2018-02-02 DIAGNOSIS — M25.561 CHRONIC PAIN OF RIGHT KNEE: ICD-10-CM

## 2018-02-02 DIAGNOSIS — M25.552 LEFT HIP PAIN: ICD-10-CM

## 2018-02-02 DIAGNOSIS — I77.9 BILATERAL CAROTID ARTERY DISEASE: ICD-10-CM

## 2018-02-02 DIAGNOSIS — G89.29 CHRONIC PAIN OF RIGHT KNEE: ICD-10-CM

## 2018-02-02 PROCEDURE — 73521 X-RAY EXAM HIPS BI 2 VIEWS: CPT | Mod: TC

## 2018-02-02 PROCEDURE — 71046 X-RAY EXAM CHEST 2 VIEWS: CPT | Mod: TC

## 2018-02-02 PROCEDURE — 99214 OFFICE O/P EST MOD 30 MIN: CPT | Mod: S$GLB,,, | Performed by: INTERNAL MEDICINE

## 2018-02-02 PROCEDURE — 71046 X-RAY EXAM CHEST 2 VIEWS: CPT | Mod: 26,,, | Performed by: RADIOLOGY

## 2018-02-02 PROCEDURE — 3008F BODY MASS INDEX DOCD: CPT | Mod: S$GLB,,, | Performed by: INTERNAL MEDICINE

## 2018-02-02 PROCEDURE — 99999 PR PBB SHADOW E&M-EST. PATIENT-LVL V: CPT | Mod: PBBFAC,,, | Performed by: INTERNAL MEDICINE

## 2018-02-02 PROCEDURE — 73521 X-RAY EXAM HIPS BI 2 VIEWS: CPT | Mod: 26,,, | Performed by: RADIOLOGY

## 2018-02-02 RX ORDER — NAPROXEN 500 MG/1
500 TABLET ORAL 2 TIMES DAILY WITH MEALS
Qty: 14 TABLET | Refills: 0 | Status: SHIPPED | OUTPATIENT
Start: 2018-02-02 | End: 2018-02-09

## 2018-02-02 NOTE — PROGRESS NOTES
Subjective:       Patient ID: Nga Livingston is a 62 y.o. female.    Chief Complaint: Follow-up (6 month follow up.) and Hip Pain (pt complains of reoccuring pain on the L hip/side & radiates to L side of lower back. hard to move in certain positions.)    HPI   C/o L hip pain for the last yr. Reports pain when she moves/turn a certain way. If she bends w/o nursing it, there's pain. Pain sometimes radiate to the mid lower back. If she doesn't move, there's no pain. Pain is about 7 of 10. Pain goes up to about 9 of 10 and is sharp but does resolve quickly. No numbness/tingling/weaknes.    C/o R knee pain intermittently over the last 3 mo.   Knee xr 3/2016 - mild B medial compartment narrowing and sclerosis.     Hasn't taken any medicines for it including over the counter medicines.     C/o discomfort (sharp pain) from her throat to the mid lower substernal area. Can come on any time of the day. Reports so painful that she can't move and then it goes away on its own. Reports can occur in any position. Never noticed if it's w/ food. Not associated with exercise. It's occurring maybe every other day. No dysphagia or trouble swallowing. No abdominal pain/nausea/vomiting/diarrhea/constipation.   Takes nexium 40mg qam.   No unexpected weight loss. No fevers/chills. Sometimes w/ night sweats that she attributes to hot flashes.  Follows w/ outside cardiologist. Thinks she may have had a stress test recently. On asa 81mg daily.  She takes tums after but then it has already resolved.     HTN - coreg 25mg BID, losartan 25mg daily.  US B carotids 3/22/17 - 20-39% R carotid artery stenosis and 40-49% L ICA stenosis.  HLD - atorvastatin 40mg daily. Pt was on 80mg daily but Dr. Buck, her cardiologist, decreased her back to the 40mg daily. He does have copies of her US of carotids.     Hyperparathyroidism -. Repeat CMP, PTH, Vit D and phos next yr w/ 1 yr f/o w/ Dr. Luna.     Sister in [place] passed away from metastatic CA  "(unknown primary). Sleeping and eating ok. Reports mood is "pretty good".     Review of Systems  Comprehensive review of systems otherwise negative. See history/subjective section for more details.    Objective:      Physical Exam    /85   Pulse 67   Ht 5' 3" (1.6 m)   Wt 74.8 kg (165 lb)   LMP 03/22/2007   SpO2 98%   BMI 29.23 kg/m²     GEN - A+OX4, NAD   HEENT - PERRL, EOMI, OP clear. MMM. Erythematous nasal mucosa.  Neck - No thyromegaly or cervical LAD. L to midline thyroid nodule.  CV - RRR, no m/r   Chest - CTAB, no wheezing or rhonchi  Abd - S/NT/ND/+BS.   Ext - 2+BDP and radial pulses. No LE edema.   MSK - R knee click on flexion/extension. No spinal or hip tenderness to palpation. 5/5 BLE strength. 1+ DTRs. Normal gait.    Previous labs reviewed.    Assessment/Plan     Nga was seen today for follow-up and hip pain.    Diagnoses and all orders for this visit:    Esophageal pain  -     Ambulatory referral to Gastroenterology  -     X-Ray Chest PA And Lateral; Future    Benign essential hypertension  -     Lipid panel; Future  -     Comprehensive metabolic panel; Future    Bilateral carotid artery disease  -     Cardiology Lab Carotid US Bilateral; Future  -     Lipid panel; Future  -     Comprehensive metabolic panel; Future    Left hip pain  -     X-Ray Hips Bilateral 2 View Incl AP Pelvis; Future  -     naproxen (EC NAPROSYN) 500 MG EC tablet; Take 1 tablet (500 mg total) by mouth 2 (two) times daily with meals.  -     Ambulatory consult to Physical Therapy  -     Comprehensive metabolic panel; Future  -     Sedimentation rate, manual; Future  -     C-reactive protein; Future    Chronic pain of right knee  -     naproxen (EC NAPROSYN) 500 MG EC tablet; Take 1 tablet (500 mg total) by mouth 2 (two) times daily with meals.  -     Ambulatory consult to Physical Therapy  -     Comprehensive metabolic panel; Future  -     Sedimentation rate, manual; Future  -     C-reactive protein; " Future    Normocytic anemia  -     CBC auto differential; Future  -     Iron and TIBC; Future  -     Ferritin; Future      Follow-up in about 5 months (around 7/2/2018).      Elaine Mahmood MD  Department of Internal Medicine - [facility]  7:57 AM

## 2018-02-07 ENCOUNTER — CLINICAL SUPPORT (OUTPATIENT)
Dept: CARDIOLOGY | Facility: CLINIC | Age: 63
End: 2018-02-07
Attending: INTERNAL MEDICINE
Payer: COMMERCIAL

## 2018-02-07 DIAGNOSIS — I77.9 BILATERAL CAROTID ARTERY DISEASE: ICD-10-CM

## 2018-02-07 LAB — INTERNAL CAROTID STENOSIS: NORMAL

## 2018-02-07 PROCEDURE — 93880 EXTRACRANIAL BILAT STUDY: CPT | Mod: S$GLB,,, | Performed by: INTERNAL MEDICINE

## 2018-02-08 ENCOUNTER — TELEPHONE (OUTPATIENT)
Dept: DERMATOLOGY | Facility: CLINIC | Age: 63
End: 2018-02-08

## 2018-02-08 NOTE — TELEPHONE ENCOUNTER
Left message on recorder of appt on 2/19 have shiv unable to contact her by phone. Left several messages over past few days. req she call if cannot keep the appt.

## 2018-02-15 ENCOUNTER — CLINICAL SUPPORT (OUTPATIENT)
Dept: REHABILITATION | Facility: HOSPITAL | Age: 63
End: 2018-02-15
Attending: INTERNAL MEDICINE
Payer: COMMERCIAL

## 2018-02-15 DIAGNOSIS — M25.561 CHRONIC PAIN OF RIGHT KNEE: ICD-10-CM

## 2018-02-15 DIAGNOSIS — M25.60 JOINT STIFFNESS: ICD-10-CM

## 2018-02-15 DIAGNOSIS — M25.552 PAIN OF LEFT HIP JOINT: ICD-10-CM

## 2018-02-15 DIAGNOSIS — G89.29 CHRONIC PAIN OF RIGHT KNEE: ICD-10-CM

## 2018-02-15 DIAGNOSIS — R26.2 DIFFICULTY WALKING: ICD-10-CM

## 2018-02-15 DIAGNOSIS — R29.898 DECREASED STRENGTH OF LOWER EXTREMITY: ICD-10-CM

## 2018-02-15 PROCEDURE — 97161 PT EVAL LOW COMPLEX 20 MIN: CPT | Mod: PN

## 2018-02-15 NOTE — PROGRESS NOTES
See Full Physical Therapy Evaluation in Plan of Care                                                      Physical Therapy Initial Evaluation     Name: Nga Livingston  [facility] Number: [id number]    Nga is a 62 y.o. female evaluated on 02/15/2018.     Diagnosis:   Encounter Diagnoses   Name Primary?    Pain of left hip joint     Chronic pain of right knee     Difficulty walking     Joint stiffness     Decreased strength of lower extremity      Physician: Elaine Mahmood MD  Treatment Orders: PT Eval and Treat    TREATMENT     Time In: 945  Time Out: 1030    PT Evaluation Completed? Yes  Discussed Plan of Care with patient: Yes    Nga received 0 minutes of therapeutic exercise including:     Nga received 0 minutes of manual therapy including:    Written Home Exercises Provided: Not This Session    ASSESSMENT     Pt's spiritual, cultural and educational needs considered and pt agreeable to plan of care and goals as stated below:     Short Term GOALS: 4 weeks. Pt agrees with goals set.  1. Patient demonstrates independence with HEP.   2. Patient demonstrates independence with Postural Awareness.   3. Patient demonstrates independence with body mechanics.   4. Patient will report pain of 4/10 at worst, on 0-10 pain scale, with all activity  5. Patient demonstrates increased R Knee flexion range of motion by 5 degrees to improve tolerance to functional activities  6. Patient demonstrates increased strength BLE's by 1/3 muscle grade or greater to improve tolerance to functional activities pain free    Long Term GOALS: 8 weeks. Pt agrees with goals set.  1. Patient demonstrates increased B LE ROM to WFL to improve tolerance to functional activities pain free.   2. Patient demonstrates increased strength BLE's to 4/5 or greater to improve tolerance to functional activities pain free.   3. Patient demonstrates improved overall function per FOTO Hip Survey to 50% Limitation or less.   4. Patient will report pain of 2/10 at  worst, on 0-10 pain scale, with all activity  5. Patient will ambulate 1 mile pain free, proper gait pattern, flat surface    Functional Limitations Reports - G Codes  Category: mobility  Tool: FOTO Hip Survey  Score: 62% Limitation   Modifier  Impairment Limitation Restriction    CH  0 % impaired, limited or restricted    CI  @ least 1% but less than 20% impaired, limited or restricted    CJ  @ least 20%<40% impaired, limited or restricted    CK  @ least 40%<60% impaired, limited or restricted    CL  @ least 60% <80% impaired, limited or restricted    CM  @ least 80%<100% impaired limited or restricted    CN  100% impaired, limited or restricted     Current/  CL = 60-80% Limitation  Goal/ : CK = 40-60% Limitation     Functional Limitations Reports - G Codes  Category: mobility  Tool: FOTO Knee Survey  Score: 61% Limitation   Modifier  Impairment Limitation Restriction    CH  0 % impaired, limited or restricted    CI  @ least 1% but less than 20% impaired, limited or restricted    CJ  @ least 20%<40% impaired, limited or restricted    CK  @ least 40%<60% impaired, limited or restricted    CL  @ least 60% <80% impaired, limited or restricted    CM  @ least 80%<100% impaired limited or restricted    CN  100% impaired, limited or restricted     Current/  CL = 60-80% Limitation  Goal/ : CK = 40-60% Limitation     PLAN     Certification Period: 2/15/18 - 5/15/18    Outpatient physical therapy 1-2 times weekly to include: pt ed, hep, therapeutic exercises, neuromuscular re-education/ balance exercises, manual therapy, joint mobilizations, aquatic therapy and modalities prn. Cont PT for 10-12 weeks. Pt may be seen by PTA as part of the rehabilitation team.     Jakob Bashir   2/15/2018      I have seen the patient, reviewed the therapist's plan of care, and I agree with the plan of care.      I certify the need for these services furnished under this plan of treatment and while under my  care.     ___________________ ________ Physician/Referring Practitioner            ___________________________ Date of Signature

## 2018-02-15 NOTE — PLAN OF CARE
Physical Therapy Initial Evaluation     Name: Nga Livingston  [facility] Number: [id number]    Nga is a 62 y.o. female evaluated on 02/15/2018.     Diagnosis:   Encounter Diagnoses   Name Primary?    Pain of left hip joint     Chronic pain of right knee     Difficulty walking     Joint stiffness     Decreased strength of lower extremity      Physician: Elaine Mahmood MD  Treatment Orders: PT Eval and Treat    Past Medical History:   Diagnosis Date    Depression     High blood cholesterol     Hypertension     Osteoporosis 3/26/15    outside records from [facility] - T score of L2 is -2.5     Current Outpatient Prescriptions   Medication Sig    aspirin 81 MG Chew Take 81 mg by mouth once daily.    atorvastatin (LIPITOR) 40 MG tablet Take 40 mg by mouth once daily.    biotin 1 mg tablet Take 1,000 mcg by mouth once daily.    carvedilol (COREG) 25 MG tablet take 1 tablet by mouth twice a day with meals    cetirizine (ZYRTEC) 10 MG tablet Take 10 mg by mouth once daily.    ciclopirox (PENLAC) 8 % Soln Apply daily to affected nail. Must remove with rubbing alcohol once weekly and then re-start.    diphenhydrAMINE (BENADRYL) 25 mg capsule Take 25 mg by mouth nightly.    econazole nitrate 1 % cream Apply topically 2 (two) times daily. X 4 wks to bottoms and sides of feet    esomeprazole (NEXIUM) 40 MG capsule take 1 capsule by mouth every morning    fish oil-omega-3 fatty acids 300-1,000 mg capsule Take 2 g by mouth once daily.    losartan (COZAAR) 25 MG tablet take 1 tablet by mouth every evening    oxybutynin (DITROPAN-XL) 5 MG TR24 take 1 tablet by mouth once daily    RESTASIS 0.05 % ophthalmic emulsion Place 1 drop into both eyes as needed.     triamcinolone (NASACORT) 55 mcg nasal inhaler 2 sprays by Nasal route once daily.    vitamin D 1000 units Tab Take 185 mg by mouth every other day.     vitamin E 100 UNIT capsule Take 100 Units by mouth once daily.  "    No current facility-administered medications for this visit.      Review of patient's allergies indicates:   Allergen Reactions    Iodinated contrast- oral and iv dye Diarrhea and Rash     Precautions: Fall    Subjective     Patient States: Chronic R medial knee pain and L lateral hip pain, with no particular onset or injury event. ~4 years ago L Hip began hurting first, R knees soon after, has gotten much worse in past 1 year. L hip can radiate to low back and R hip. R knee pain isolated to medial knee. Fell and landed on R knee ~2 years ago, however didn't bother her initially. Denies numbness/tingling. Part-time tutoring with desk-work/standing. Pt denies surgeries or injections to lumbar, hip, or knee. Hasn't required use of AD. R knee can be swollen. L LE feels stronger than R LE.    Diagnostic Tests: X-ray Hip 2/2/18  "Right: There is mild DJD. No fracture dislocation bone destruction seen.  Left: There is mild DJD. No fracture dislocation bone destruction seen."    X-ray Knee 3/8/16  "Mild bilateral medial compartment narrowing and sclerosis."    Pain Scale: Nga rates pain on a scale of 0-10 to be 7 at worst L hip, 5 in R knee; 0 currently; 0 at best .  Onset: gradual  Radicular symptoms:  None  Aggravating factors:   Squatting, bending, trunk rotation with sitting for hip, walking due to knee pain  Easing factors:  Sit, rest  Prior Therapy: 3-4 years for hip/knee pain  Functional Deficits Leading to Referral: Difficulty walking, standing, sitting  Prior functional status: Independent  DME owned/used: None  Occupation:  Part-time                        Pts goals:  Return to exercise, reduce pain, increase walking distance    Objective     Posture: Slouched Sitting Posture, large abdomen    HIP AROM:      L  R    Flexion:    85 AROM  100 PROM 85 AROM  100 PROM   Extension:  5 5   Abd  20 20   Add  20 20   IR  15 30   ER  20 25     KNEE ROM:    L  R    Flexion:    125 120   Extension:  +5 hyper +3 " hyper     Strength: Knee   Left Right   Quadriceps 4/5 4-/5 p! R knee   Hamstrings 3+/5 3+/5     Strength: Hip    Left Right   Iliopsoas 4/5 p! L lateral hip 4-/5 p! Medial/anterior knee   Glute Med 3+/5 4-/5   IR 4/5 4/5   ER 4/5 4-/5 p! medial knee   Ext 3-/5 3-/5   Ankle DF 4/5 4/5   Ankle PF 4/5 4/5     Dermatomes: Sensation: Light Touch: Intact  Myotomes: WNL  Flexibility: Decreased R Hip Flex, B HS    Special Tests:    Left  Right    SLR  Negative Negative   MERCEDES   Positive (anterior hip p!, motion restriction) Positive (motion restriction)   Dial (Log Roll)  Negative Negative     Joint Mobility: WFL B patellofemoral  Palpation: Mild Tenderness R medial knee  Sensation: intact to light touch    Gait: Slack ambulated with none.  Level of Assistance: independent  Patient displays decreased stance time on L LE, R pelvic drop Trendelenburg, increased weight-shift to R    TREATMENT     Time In: 945  Time Out: 1030    PT Evaluation Completed? Yes  Discussed Plan of Care with patient: Yes    Nga received 0 minutes of therapeutic exercise including:     Nga received 0 minutes of manual therapy including:    Written Home Exercises Provided: Not This Session    ASSESSMENT     Patient presents to Physical Therapy Evaluation with medical diagnosis of L Hip and R Knee Pain, with signs and symptoms including: increased pain in B LE, decreased ROM in hip/knee, decreased LE strength, dysfunctional gait quality, impaired balance, decreased flexibility, and decreased tolerance to functional activities. Pain isolated to R medial/anterior knee and L lateral hip, with no significant radiation of pain throughout mobility testing today. Pt with strength deficits throughout bilateral LE musculature, with greatest deficits in hip strength. Pt with symmetrical ROM deficits in B hip, with R mild deficit in R knee flexion and discomfort at available end-range. Pt with impaired gait quality characterized by R pelvic drop and  Trendelenburg sign due to L hip abductor weakness, decreased stance on L LE due to antalgic gait. Pt with good motivation to perform physical activity and responds well to cueing.    Pt prognosis is Good.  Pt will benefit from skilled outpatient physical therapy to address the above stated deficits, provide pt/family education and to maximize pt's level of independence.     Medical necessity is demonstrated by the following IMPAIRMENTS/PROBLEMS:  weakness, impaired endurance, impaired self care skills, impaired functional mobility, gait instability, impaired balance, decreased coordination, decreased lower extremity function, pain, decreased ROM, impaired coordination, impaired joint extensibility and impaired muscle length    History  Co-morbidities and personal factors that may impact the plan of care Examination  Body Structures and Functions, activity limitations and participation restrictions that may impact the plan of care Clinical Presentation   Decision Making/ Complexity Score   Co-morbidities:     Depression  Osteoporosis            Personal Factors:    Body Regions: BLE, trunk/core     Body Systems: musculoskeletal (ROM, strength, endurance, flexibility, gait); neuromuscular (balance, posture, motor control, coordination)        Activity limitations: walking, standing, sitting, stairs, squatting, bending, lifting, pushing/pulling      Participation Restrictions: work activity, heavy household activities, exercise, recreational walking, social interaction, community participation         Stable, uncomplicated   Low Complexity    FOTO Hip Survey  Score: 62% Limitation    FOTO Knee Survey  Score: 61% Limitation     Pt's spiritual, cultural and educational needs considered and pt agreeable to plan of care and goals as stated below:     Short Term GOALS: 4 weeks. Pt agrees with goals set.  1. Patient demonstrates independence with HEP.   2. Patient demonstrates independence with Postural Awareness.   3.  Patient demonstrates independence with body mechanics.   4. Patient will report pain of 4/10 at worst, on 0-10 pain scale, with all activity  5. Patient demonstrates increased R Knee flexion range of motion by 5 degrees to improve tolerance to functional activities  6. Patient demonstrates increased strength BLE's by 1/3 muscle grade or greater to improve tolerance to functional activities pain free    Long Term GOALS: 8 weeks. Pt agrees with goals set.  1. Patient demonstrates increased B LE ROM to WFL to improve tolerance to functional activities pain free.   2. Patient demonstrates increased strength BLE's to 4/5 or greater to improve tolerance to functional activities pain free.   3. Patient demonstrates improved overall function per FOTO Hip Survey to 50% Limitation or less.   4. Patient will report pain of 2/10 at worst, on 0-10 pain scale, with all activity  5. Patient will ambulate 1 mile pain free, proper gait pattern, flat surface    Functional Limitations Reports - G Codes  Category: mobility  Tool: FOTO Hip Survey  Score: 62% Limitation   Modifier  Impairment Limitation Restriction    CH  0 % impaired, limited or restricted    CI  @ least 1% but less than 20% impaired, limited or restricted    CJ  @ least 20%<40% impaired, limited or restricted    CK  @ least 40%<60% impaired, limited or restricted    CL  @ least 60% <80% impaired, limited or restricted    CM  @ least 80%<100% impaired limited or restricted    CN  100% impaired, limited or restricted     Current/  CL = 60-80% Limitation  Goal/ : CK = 40-60% Limitation     Functional Limitations Reports - G Codes  Category: mobility  Tool: FOTO Knee Survey  Score: 61% Limitation   Modifier  Impairment Limitation Restriction    CH  0 % impaired, limited or restricted    CI  @ least 1% but less than 20% impaired, limited or restricted    CJ  @ least 20%<40% impaired, limited or restricted    CK  @ least 40%<60% impaired, limited or restricted     CL  @ least 60% <80% impaired, limited or restricted    CM  @ least 80%<100% impaired limited or restricted    CN  100% impaired, limited or restricted     Current/  CL = 60-80% Limitation  Goal/ : CK = 40-60% Limitation     PLAN     Certification Period: 2/15/18 - 5/15/18    Outpatient physical therapy 1-2 times weekly to include: pt ed, hep, therapeutic exercises, neuromuscular re-education/ balance exercises, manual therapy, joint mobilizations, aquatic therapy and modalities prn. Cont PT for 10-12 weeks. Pt may be seen by PTA as part of the rehabilitation team.     Jakob Bashir   2/15/2018      I have seen the patient, reviewed the therapist's plan of care, and I agree with the plan of care.      I certify the need for these services furnished under this plan of treatment and while under my care.     ___________________ ________ Physician/Referring Practitioner            ___________________________ Date of Signature

## 2018-02-19 ENCOUNTER — CLINICAL SUPPORT (OUTPATIENT)
Dept: DERMATOLOGY | Facility: CLINIC | Age: 63
End: 2018-02-19
Payer: COMMERCIAL

## 2018-02-19 DIAGNOSIS — L80 VITILIGO: ICD-10-CM

## 2018-02-19 PROCEDURE — 99999 PR PBB SHADOW E&M-EST. PATIENT-LVL II: CPT | Mod: PBBFAC,,,

## 2018-02-19 PROCEDURE — 96900 ACTINOTHERAPY UV LIGHT: CPT | Mod: S$GLB,,, | Performed by: DERMATOLOGY

## 2018-02-19 NOTE — PROGRESS NOTES
Light tx 20 for vitiligo. NB-UVB to body at 200 mj (due to missed treatments( with eyes shielded. .

## 2018-02-21 ENCOUNTER — CLINICAL SUPPORT (OUTPATIENT)
Dept: DERMATOLOGY | Facility: CLINIC | Age: 63
End: 2018-02-21
Payer: COMMERCIAL

## 2018-02-21 ENCOUNTER — CLINICAL SUPPORT (OUTPATIENT)
Dept: REHABILITATION | Facility: HOSPITAL | Age: 63
End: 2018-02-21
Attending: INTERNAL MEDICINE
Payer: COMMERCIAL

## 2018-02-21 DIAGNOSIS — R29.898 DECREASED STRENGTH OF LOWER EXTREMITY: ICD-10-CM

## 2018-02-21 DIAGNOSIS — M25.60 JOINT STIFFNESS: ICD-10-CM

## 2018-02-21 DIAGNOSIS — R26.2 DIFFICULTY WALKING: ICD-10-CM

## 2018-02-21 DIAGNOSIS — G89.29 CHRONIC PAIN OF RIGHT KNEE: ICD-10-CM

## 2018-02-21 DIAGNOSIS — L80 VITILIGO: ICD-10-CM

## 2018-02-21 DIAGNOSIS — M25.561 CHRONIC PAIN OF RIGHT KNEE: ICD-10-CM

## 2018-02-21 DIAGNOSIS — M25.552 PAIN OF LEFT HIP JOINT: ICD-10-CM

## 2018-02-21 PROCEDURE — 97110 THERAPEUTIC EXERCISES: CPT | Mod: PN

## 2018-02-21 PROCEDURE — 99999 PR PBB SHADOW E&M-EST. PATIENT-LVL II: CPT | Mod: PBBFAC,,,

## 2018-02-21 PROCEDURE — 96900 ACTINOTHERAPY UV LIGHT: CPT | Mod: S$GLB,,, | Performed by: DERMATOLOGY

## 2018-02-21 NOTE — PROGRESS NOTES
"                                                    Physical Therapy Daily Note     Name: Nga Livingston  Clinic Number: [id number]  Diagnosis:   Encounter Diagnoses   Name Primary?    Pain of left hip joint     Chronic pain of right knee     Difficulty walking     Joint stiffness     Decreased strength of lower extremity      Physician: Elaine Mahmood MD    Visit #: 1 of 5  (new referral, total visit 2)  MOREIRA: 3/23/18    Time In: 900  Time Out: 1000  Total Treatment Time 1:1: 60 min (30 min 1:1 with PT)    Subjective     Pt reports: R knee and L hip are currently pain-free. She turned wrong way yesterday and felt pain in L hip.  Pain Scale: Nga rates pain on a scale of 0-10 to be 1 currently.    Objective     Nga received individual therapeutic exercises to develop strength, endurance, ROM, flexibility, posture and core stabilization for 60 minutes including:    Upright Bike 5 min  Calf Str c/ incline board 3x30"  HS Str c/ strap 3x30" ea LE  Quad Set 2x10 B LE  Hip Add c/ ball 3x10  Hip Abd c/ RTB 3x10  SAQ 2x10 2#    SLR 2x10 ea  Bridge 3x10  SL Clam c/ YTB 2x10  Heel Slide c/ board 15x ea LE    Nga received the following manual therapy techniques: Soft tissue Mobilization were applied to the: R Knee/L Hip for 0 minutes including:     Written Home Exercises Provided: Will Provide Next Session    PT/PTA face to face conference performed during this session    Assessment     Patient tolerated treatment session very well. Pt with no significant symptom provocation in either LE throughout treatment session. Pt utilized appropriate LE strength and endurance for slow, controlled movement pattern and proper exercise form. Pt with easily achieved muscle fatigue in gluteal musculature during bridging activity, mild low back discomfort due to excessive paraspinal activation and deficits in hip extensors.    This is a 62 y.o. female referred to outpatient physical therapy and presents with a medical diagnosis of L Hip " and R Knee and demonstrates limitations as described in the problem list. Pt prognosis is Good. Pt will continue to benefit from skilled outpatient physical therapy to address the deficits listed in the problem list, provide pt/family education and to maximize pt's level of independence in the home and community environment.     Goals as follows:    Short Term GOALS: 4 weeks. Pt agrees with goals set.  1. Patient demonstrates independence with HEP.   2. Patient demonstrates independence with Postural Awareness.   3. Patient demonstrates independence with body mechanics.   4. Patient will report pain of 4/10 at worst, on 0-10 pain scale, with all activity  5. Patient demonstrates increased R Knee flexion range of motion by 5 degrees to improve tolerance to functional activities  6. Patient demonstrates increased strength BLE's by 1/3 muscle grade or greater to improve tolerance to functional activities pain free    Functional Limitations Reports - G Codes  Category: mobility  Tool: FOTO Hip Survey  Score: 62% Limitation   Modifier  Impairment Limitation Restriction    CH  0 % impaired, limited or restricted    CI  @ least 1% but less than 20% impaired, limited or restricted    CJ  @ least 20%<40% impaired, limited or restricted    CK  @ least 40%<60% impaired, limited or restricted    CL  @ least 60% <80% impaired, limited or restricted    CM  @ least 80%<100% impaired limited or restricted    CN  100% impaired, limited or restricted     Current/  CL = 60-80% Limitation  Goal/ : CK = 40-60% Limitation      Plan     Certification Period:  2/15/18 - 5/15/18    Continue with established Plan of Care towards PT goals.    Therapist: Jakob Bashir, PT  2/21/2018

## 2018-02-21 NOTE — PROGRESS NOTES
Light tx 21 for vitiligo. NB-UVB to body at 250 mj with eyes shielded. No erythema with last dose.

## 2018-02-23 ENCOUNTER — CLINICAL SUPPORT (OUTPATIENT)
Dept: REHABILITATION | Facility: HOSPITAL | Age: 63
End: 2018-02-23
Attending: INTERNAL MEDICINE
Payer: COMMERCIAL

## 2018-02-23 DIAGNOSIS — R29.898 DECREASED STRENGTH OF LOWER EXTREMITY: ICD-10-CM

## 2018-02-23 DIAGNOSIS — M25.60 JOINT STIFFNESS: ICD-10-CM

## 2018-02-23 DIAGNOSIS — M25.561 CHRONIC PAIN OF RIGHT KNEE: ICD-10-CM

## 2018-02-23 DIAGNOSIS — R26.2 DIFFICULTY WALKING: ICD-10-CM

## 2018-02-23 DIAGNOSIS — M25.552 PAIN OF LEFT HIP JOINT: ICD-10-CM

## 2018-02-23 DIAGNOSIS — G89.29 CHRONIC PAIN OF RIGHT KNEE: ICD-10-CM

## 2018-02-23 PROCEDURE — 97110 THERAPEUTIC EXERCISES: CPT | Mod: PN

## 2018-02-23 NOTE — PROGRESS NOTES
"                                                    Physical Therapy Daily Note     Name: Nga Livingston  [facility] Number: [id number]  Diagnosis:   Encounter Diagnoses   Name Primary?    Pain of left hip joint     Chronic pain of right knee     Difficulty walking     Joint stiffness     Decreased strength of lower extremity      Physician: Elaine Mahmood MD    Visit #: 2 of 5  (new referral, total visit 3)  MOREIRA: 3/23/18    Time In: 926  Time Out: 1030  Total Treatment Time 1:1: 60 min (60 min 1:1 with PT)    Subjective     Pt reports: She is pain-free. Not significantly sore following previous session.  Pain Scale: Nga rates pain on a scale of 0-10 to be 0 currently.    Objective     Nga received individual therapeutic exercises to develop strength, endurance, ROM, flexibility, posture and core stabilization for 60 minutes including:    Upright Bike 5 min  Calf Str c/ incline board 3x30"  HS Str at stairs 3x30" ea LE  +Shuttle B LE Squat 2-black cord 2x15  +Shuttle Piriformis Str 2x30" ea  +R TKE c/ pink cord 2x10    Quad Set 2x10 B LE  Hip Add c/ ball 2x10  Hip Abd c/ RTB 2x10  SAQ 2x10 2#    SLR 2x10 ea  Bridge 3x10  SL Clam c/ RTB 2x10  Heel Slide c/ board 15x ea LE    Nga received the following manual therapy techniques: Soft tissue Mobilization were applied to the: R Knee/L Hip for 0 minutes including:     Written Home Exercises Provided: Will Provide Next Session    PT/PTA face to face conference performed during this session    Assessment     Patient tolerated treatment session very well. Pt with good response to increased progression of LE strength/endurance training with no adverse effect. Pt maintains slow, controlled movement pattern with proper exercise form throughout entire treatment session. Pt is prepared to increase resistance training in open and closed chain positions next session.     This is a 62 y.o. female referred to outpatient physical therapy and presents with a medical diagnosis of L Hip " and R Knee and demonstrates limitations as described in the problem list. Pt prognosis is Good. Pt will continue to benefit from skilled outpatient physical therapy to address the deficits listed in the problem list, provide pt/family education and to maximize pt's level of independence in the home and community environment.     Goals as follows:    Short Term GOALS: 4 weeks. Pt agrees with goals set.  1. Patient demonstrates independence with HEP.   2. Patient demonstrates independence with Postural Awareness.   3. Patient demonstrates independence with body mechanics.   4. Patient will report pain of 4/10 at worst, on 0-10 pain scale, with all activity  5. Patient demonstrates increased R Knee flexion range of motion by 5 degrees to improve tolerance to functional activities  6. Patient demonstrates increased strength BLE's by 1/3 muscle grade or greater to improve tolerance to functional activities pain free    Functional Limitations Reports - G Codes  Category: mobility  Tool: FOTO Hip Survey  Score: 62% Limitation   Modifier  Impairment Limitation Restriction    CH  0 % impaired, limited or restricted    CI  @ least 1% but less than 20% impaired, limited or restricted    CJ  @ least 20%<40% impaired, limited or restricted    CK  @ least 40%<60% impaired, limited or restricted    CL  @ least 60% <80% impaired, limited or restricted    CM  @ least 80%<100% impaired limited or restricted    CN  100% impaired, limited or restricted     Current/  CL = 60-80% Limitation  Goal/ : CK = 40-60% Limitation      Plan     Certification Period:  2/15/18 - 5/15/18    Continue with established Plan of Care towards PT goals.    Therapist: Jakob Bashir, PT  2/23/2018

## 2018-02-26 ENCOUNTER — CLINICAL SUPPORT (OUTPATIENT)
Dept: REHABILITATION | Facility: HOSPITAL | Age: 63
End: 2018-02-26
Attending: INTERNAL MEDICINE
Payer: COMMERCIAL

## 2018-02-26 ENCOUNTER — CLINICAL SUPPORT (OUTPATIENT)
Dept: DERMATOLOGY | Facility: CLINIC | Age: 63
End: 2018-02-26
Payer: COMMERCIAL

## 2018-02-26 DIAGNOSIS — M25.552 PAIN OF LEFT HIP JOINT: Primary | ICD-10-CM

## 2018-02-26 DIAGNOSIS — G89.29 CHRONIC PAIN OF RIGHT KNEE: ICD-10-CM

## 2018-02-26 DIAGNOSIS — R29.898 DECREASED STRENGTH OF LOWER EXTREMITY: ICD-10-CM

## 2018-02-26 DIAGNOSIS — L80 VITILIGO: ICD-10-CM

## 2018-02-26 DIAGNOSIS — M25.561 CHRONIC PAIN OF RIGHT KNEE: ICD-10-CM

## 2018-02-26 DIAGNOSIS — R26.2 DIFFICULTY WALKING: ICD-10-CM

## 2018-02-26 DIAGNOSIS — M25.60 JOINT STIFFNESS: ICD-10-CM

## 2018-02-26 PROCEDURE — 99999 PR PBB SHADOW E&M-EST. PATIENT-LVL II: CPT | Mod: PBBFAC,,,

## 2018-02-26 PROCEDURE — 96900 ACTINOTHERAPY UV LIGHT: CPT | Mod: S$GLB,,, | Performed by: DERMATOLOGY

## 2018-02-26 PROCEDURE — 97110 THERAPEUTIC EXERCISES: CPT | Mod: PN

## 2018-02-26 NOTE — PROGRESS NOTES
"                                                    Physical Therapy Daily Note     Name: Nga Livingston  [facility] Number: [id number]  Diagnosis:   Encounter Diagnoses   Name Primary?    Pain of left hip joint Yes    Chronic pain of right knee     Difficulty walking     Joint stiffness     Decreased strength of lower extremity      Physician: Elaine Mahmood MD    Visit #: 3 of 5  (new referral, total visit 3)  MOREIRA: 3/23/18    Time In: 0855  Time Out: 0950  Total Treatment Time 1:1: 55 min (55 min 1:1 with PTA)    Subjective     Pt reports:that she is doing good today.  Pt states that she is having no pain today and she really only feel sit every so often.  Pain Scale: Nga rates pain on a scale of 0-10 to be 0 currently.    Objective     Nga received individual therapeutic exercises to develop strength, endurance, ROM, flexibility, posture and core stabilization for 55 minutes including:    Upright Bike 6 min  Calf Str c/ incline board 3x30"  HS Str at stairs 3x30" ea LE  Shuttle B LE Squat 2-black cord 2x15  Shuttle Piriformis Str 2x30" ea  R TKE c/ pink cord 2x10    Quad Set 2x10 B LE  Hip Add c/ ball 2x10  Hip Abd c/ RTB 2x10  SAQ 2x10 2#    SLR 2x10 ea  Bridge 3x10  SL Clam c/ RTB 2x10  Heel Slide c/ board 15x ea LE    Nga received the following manual therapy techniques: Soft tissue Mobilization were applied to the: R Knee/L Hip for 0 minutes including:     Written Home Exercises Provided: see patient instructions      Assessment     Patient tolerated treatment session well today.  Patient with good tolerance to exercises with appropriate training effect achieved.  Patient demonstrated good control of exercises with proper form maintained throughout performance.   This is a 62 y.o. female referred to outpatient physical therapy and presents with a medical diagnosis of L Hip and R Knee and demonstrates limitations as described in the problem list. Pt prognosis is Good. Pt will continue to benefit from skilled " outpatient physical therapy to address the deficits listed in the problem list, provide pt/family education and to maximize pt's level of independence in the home and community environment.     Goals as follows:    Short Term GOALS: 4 weeks. Pt agrees with goals set.  1. Patient demonstrates independence with HEP.   2. Patient demonstrates independence with Postural Awareness.   3. Patient demonstrates independence with body mechanics.   4. Patient will report pain of 4/10 at worst, on 0-10 pain scale, with all activity  5. Patient demonstrates increased R Knee flexion range of motion by 5 degrees to improve tolerance to functional activities  6. Patient demonstrates increased strength BLE's by 1/3 muscle grade or greater to improve tolerance to functional activities pain free    Functional Limitations Reports - G Codes  Category: mobility  Tool: FOTO Hip Survey  Score: 62% Limitation   Modifier  Impairment Limitation Restriction    CH  0 % impaired, limited or restricted    CI  @ least 1% but less than 20% impaired, limited or restricted    CJ  @ least 20%<40% impaired, limited or restricted    CK  @ least 40%<60% impaired, limited or restricted    CL  @ least 60% <80% impaired, limited or restricted    CM  @ least 80%<100% impaired limited or restricted    CN  100% impaired, limited or restricted     Current/  CL = 60-80% Limitation  Goal/ : CK = 40-60% Limitation      Plan     Certification Period:  2/15/18 - 5/15/18    Continue with established Plan of Care towards PT goals.    Therapist: Cindy Alejandre, PTA  2/26/2018

## 2018-02-28 ENCOUNTER — CLINICAL SUPPORT (OUTPATIENT)
Dept: DERMATOLOGY | Facility: CLINIC | Age: 63
End: 2018-02-28
Payer: COMMERCIAL

## 2018-02-28 ENCOUNTER — CLINICAL SUPPORT (OUTPATIENT)
Dept: REHABILITATION | Facility: HOSPITAL | Age: 63
End: 2018-02-28
Attending: INTERNAL MEDICINE
Payer: COMMERCIAL

## 2018-02-28 DIAGNOSIS — M25.561 CHRONIC PAIN OF RIGHT KNEE: ICD-10-CM

## 2018-02-28 DIAGNOSIS — M25.60 JOINT STIFFNESS: ICD-10-CM

## 2018-02-28 DIAGNOSIS — G89.29 CHRONIC PAIN OF RIGHT KNEE: ICD-10-CM

## 2018-02-28 DIAGNOSIS — M25.552 PAIN OF LEFT HIP JOINT: ICD-10-CM

## 2018-02-28 DIAGNOSIS — R26.2 DIFFICULTY WALKING: ICD-10-CM

## 2018-02-28 DIAGNOSIS — R29.898 DECREASED STRENGTH OF LOWER EXTREMITY: ICD-10-CM

## 2018-02-28 DIAGNOSIS — L80 VITILIGO: ICD-10-CM

## 2018-02-28 PROCEDURE — 96900 ACTINOTHERAPY UV LIGHT: CPT | Mod: S$GLB,,, | Performed by: DERMATOLOGY

## 2018-02-28 PROCEDURE — 99999 PR PBB SHADOW E&M-EST. PATIENT-LVL II: CPT | Mod: PBBFAC,,,

## 2018-02-28 PROCEDURE — 97110 THERAPEUTIC EXERCISES: CPT | Mod: PN

## 2018-02-28 NOTE — PROGRESS NOTES
"                                                    Physical Therapy Daily Note     Name: Nga Livingston  Clinic Number: [id number]  Diagnosis:   Encounter Diagnoses   Name Primary?    Pain of left hip joint     Chronic pain of right knee     Difficulty walking     Joint stiffness     Decreased strength of lower extremity      Physician: Elaine Mahmood MD    Visit #: 4 of 5  (new referral, total visit 5)  MOREIRA: 3/23/18    Time In: 900  Time Out: 1000  Total Treatment Time 1:1: 60 min (40 min 1:1 with PT)    Subjective     Pt reports: she is primarily pain-free. Occasionally she will feel a "pinch in R knee"  Pain Scale: Nga rates pain on a scale of 0-10 to be 0 currently.    Objective     Nga received individual therapeutic exercises to develop strength, endurance, ROM, flexibility, posture and core stabilization for 55 minutes including:    Upright Bike 6 min  Calf Str c/ incline board 3x30"  HS Str at stairs 3x30" ea LE  Shuttle B LE Squat 2-black cord 2x15  Shuttle Piriformis Str 2x30" ea  +Wall squat c/ ball 2x10  +Standing Heel Raise 2x10  R TKE c/ pink cord 2x10    +Knee Ext Matrix 10# 2x10   +HS Curl Matrix 25# 2x10  SLR 1# 2x10 ea  SL Clam c/ RTB 2x10  Bridge 3x10  Quad Set 2x10 B LE  Hip Add c/ ball 2x10  Hip Abd c/ RTB 2x10  SAQ 2x10 2#      Heel Slide c/ board 15x ea LE    Nga received the following manual therapy techniques: Soft tissue Mobilization were applied to the: R Knee/L Hip for 0 minutes including:     Written Home Exercises Provided: see patient instructions      Assessment     Patient tolerated treatment session well today. Pt with good performance on introduction to machine resistance training, no adverse effect, appropriate muscle fatigue achieved, appropriate exercise form. Pt with cueing provided for improved exercise form for proper foot placement for stance width and erect trunk posture. Pt encouraged to adhere to HEP in preparation for discharge in upcoming visit.  .   This is a 62 " y.o. female referred to outpatient physical therapy and presents with a medical diagnosis of L Hip and R Knee and demonstrates limitations as described in the problem list. Pt prognosis is Good. Pt will continue to benefit from skilled outpatient physical therapy to address the deficits listed in the problem list, provide pt/family education and to maximize pt's level of independence in the home and community environment.     Goals as follows:    Short Term GOALS: 4 weeks. Pt agrees with goals set.  1. Patient demonstrates independence with HEP.   2. Patient demonstrates independence with Postural Awareness.   3. Patient demonstrates independence with body mechanics.   4. Patient will report pain of 4/10 at worst, on 0-10 pain scale, with all activity  5. Patient demonstrates increased R Knee flexion range of motion by 5 degrees to improve tolerance to functional activities  6. Patient demonstrates increased strength BLE's by 1/3 muscle grade or greater to improve tolerance to functional activities pain free    Functional Limitations Reports - G Codes  Category: mobility  Tool: FOTO Hip Survey  Score: 62% Limitation   Modifier  Impairment Limitation Restriction    CH  0 % impaired, limited or restricted    CI  @ least 1% but less than 20% impaired, limited or restricted    CJ  @ least 20%<40% impaired, limited or restricted    CK  @ least 40%<60% impaired, limited or restricted    CL  @ least 60% <80% impaired, limited or restricted    CM  @ least 80%<100% impaired limited or restricted    CN  100% impaired, limited or restricted     Current/  CL = 60-80% Limitation  Goal/ : CK = 40-60% Limitation      Plan     Certification Period:  2/15/18 - 5/15/18    Continue with established Plan of Care towards PT goals.    Therapist: Jakob Bashir, PT  2/28/2018

## 2018-03-05 ENCOUNTER — CLINICAL SUPPORT (OUTPATIENT)
Dept: DERMATOLOGY | Facility: CLINIC | Age: 63
End: 2018-03-05
Payer: COMMERCIAL

## 2018-03-05 DIAGNOSIS — L80 VITILIGO: ICD-10-CM

## 2018-03-05 PROCEDURE — 96900 ACTINOTHERAPY UV LIGHT: CPT | Mod: S$GLB,,, | Performed by: DERMATOLOGY

## 2018-03-05 PROCEDURE — 99999 PR PBB SHADOW E&M-EST. PATIENT-LVL II: CPT | Mod: PBBFAC,,,

## 2018-03-07 ENCOUNTER — CLINICAL SUPPORT (OUTPATIENT)
Dept: REHABILITATION | Facility: HOSPITAL | Age: 63
End: 2018-03-07
Attending: INTERNAL MEDICINE
Payer: COMMERCIAL

## 2018-03-07 ENCOUNTER — CLINICAL SUPPORT (OUTPATIENT)
Dept: DERMATOLOGY | Facility: CLINIC | Age: 63
End: 2018-03-07
Payer: COMMERCIAL

## 2018-03-07 DIAGNOSIS — M25.552 PAIN OF LEFT HIP JOINT: ICD-10-CM

## 2018-03-07 DIAGNOSIS — L80 VITILIGO: ICD-10-CM

## 2018-03-07 DIAGNOSIS — G89.29 CHRONIC PAIN OF RIGHT KNEE: ICD-10-CM

## 2018-03-07 DIAGNOSIS — M25.561 CHRONIC PAIN OF RIGHT KNEE: ICD-10-CM

## 2018-03-07 DIAGNOSIS — R29.898 DECREASED STRENGTH OF LOWER EXTREMITY: ICD-10-CM

## 2018-03-07 DIAGNOSIS — R26.2 DIFFICULTY WALKING: ICD-10-CM

## 2018-03-07 DIAGNOSIS — M25.60 JOINT STIFFNESS: ICD-10-CM

## 2018-03-07 PROCEDURE — 99999 PR PBB SHADOW E&M-EST. PATIENT-LVL II: CPT | Mod: PBBFAC,,,

## 2018-03-07 PROCEDURE — 97110 THERAPEUTIC EXERCISES: CPT | Mod: PN

## 2018-03-07 PROCEDURE — 96900 ACTINOTHERAPY UV LIGHT: CPT | Mod: S$GLB,,, | Performed by: DERMATOLOGY

## 2018-03-07 NOTE — PROGRESS NOTES
"                                                    Physical Therapy Discharge Note     Name: Nga Livingston  [facility] Number: [id number]  Diagnosis:   Encounter Diagnoses   Name Primary?    Pain of left hip joint     Chronic pain of right knee     Difficulty walking     Joint stiffness     Decreased strength of lower extremity      Physician: Elaine Mahmood MD    Visit #: 5 of 5  (new referral, total visit 6)  MOREIRA: 3/23/18    Time In: 900  Time Out: 1000  Total Treatment Time 1:1: 60 min (30 min 1:1 with PT)    Subjective     Pt reports: she is doing well, occasionally feels L hip/lumbar discomfort while performing squatting/forward bending. Pt is prepared for discharge today  Pain Scale: Nga rates pain on a scale of 0-10 to be 0 currently.    Objective     KNEE ROM:    L  R    Flexion:     130 130   Extension:  +5 hyper +3 hyper      Strength: Knee    Left Right   Quadriceps 4/5 4/5   Hamstrings 4/5 4/5      Strength: Hip     Left Right   Iliopsoas 4/5  4/5    Glute Med 4-/5 4-/5   IR 4/5 4/5   ER 4/5 4/5   Ext 3+/5 3+/5   Ankle DF 4/5 4/5   Ankle PF 4/5 4/5     Nga received individual therapeutic exercises to develop strength, endurance, ROM, flexibility, posture and core stabilization for 55 minutes including:    Upright Bike 6 min  Calf Str c/ incline board 3x30"  HS Str at stairs 3x30" ea LE  Shuttle B LE Squat 2-black cord 2x15  Shuttle Piriformis Str 2x30" ea  +Wall squat c/ ball 2x10  +Standing Heel Raise 2x10  R TKE c/ pink cord 2x10    +Knee Ext Matrix 10# 2x10   +HS Curl Matrix 25# 2x10  SLR 1# 2x10 ea  SL Clam c/ RTB 2x10  SL Hip Abd15x  SL Hip ext 15x    Nga received the following manual therapy techniques: Soft tissue Mobilization were applied to the: R Knee/L Hip for 0 minutes including:     Written Home Exercises Provided: see patient instructions    Assessment     Patient tolerated treatment session well today, with discharge note performed. Pt achieved 5/6 short-term goals and 4/'5 long-term " goals with deficits remaining in full normalized LE strength and body mechanics during functional activity. Pt required body mechanics education for improved squat and erect trunk posture this session, fair demonstration of returned learning. Pt has maintained primarily pain-free status during episode of care. Pt with age-appropriate ROM in B LE, and is indep with HEP. Pt is at low risk for re-injury and prepared for discharge.    Short Term GOALS: 4 weeks. Pt agrees with goals set.  1. Patient demonstrates independence with HEP. met  2. Patient demonstrates independence with Postural Awareness. met  3. Patient demonstrates independence with body mechanics. Not met  4. Patient will report pain of 4/10 at worst, on 0-10 pain scale, with all activity met  5. Patient demonstrates increased R Knee flexion range of motion by 5 degrees to improve tolerance to functional activities met  6. Patient demonstrates increased strength BLE's by 1/3 muscle grade or greater to improve tolerance to functional activities pain free met    Long Term GOALS: 8 weeks. Pt agrees with goals set.  1. Patient demonstrates increased B LE ROM to WFL to improve tolerance to functional activities pain free. met  2. Patient demonstrates increased strength BLE's to 4/5 or greater to improve tolerance to functional activities pain free. Not met  3. Patient demonstrates improved overall function per FOTO Hip Survey to 50% Limitation or less. met  4. Patient will report pain of 2/10 at worst, on 0-10 pain scale, with all activity met  5. Patient will ambulate 1 mile pain free, proper gait pattern, flat surface met    Functional Limitations Reports - G Codes  Category: mobility  Tool: FOTO Hip Survey  Score: 38% Limitation   Modifier  Impairment Limitation Restriction    CH  0 % impaired, limited or restricted    CI  @ least 1% but less than 20% impaired, limited or restricted    CJ  @ least 20%<40% impaired, limited or restricted    CK  @ least  40%<60% impaired, limited or restricted    CL  @ least 60% <80% impaired, limited or restricted    CM  @ least 80%<100% impaired limited or restricted    CN  100% impaired, limited or restricted     Discharge/   CJ = 20-40% Limitation  Goal/ : CK = 40-60% Limitation     Plan     Certification Period:  2/15/18 - 5/15/18    Pt discharged from out-patient physical therapy. Pt to contact MD or PT for any significant decline in functional status.    Therapist: Jakob Bashir, PT  3/7/2018

## 2018-03-12 ENCOUNTER — CLINICAL SUPPORT (OUTPATIENT)
Dept: DERMATOLOGY | Facility: CLINIC | Age: 63
End: 2018-03-12
Payer: COMMERCIAL

## 2018-03-12 DIAGNOSIS — L80 VITILIGO: ICD-10-CM

## 2018-03-12 PROCEDURE — 96900 ACTINOTHERAPY UV LIGHT: CPT | Mod: S$GLB,,, | Performed by: DERMATOLOGY

## 2018-03-12 PROCEDURE — 99999 PR PBB SHADOW E&M-EST. PATIENT-LVL II: CPT | Mod: PBBFAC,,,

## 2018-03-14 ENCOUNTER — CLINICAL SUPPORT (OUTPATIENT)
Dept: DERMATOLOGY | Facility: CLINIC | Age: 63
End: 2018-03-14
Payer: COMMERCIAL

## 2018-03-14 DIAGNOSIS — L80 VITILIGO: ICD-10-CM

## 2018-03-14 PROCEDURE — 99999 PR PBB SHADOW E&M-EST. PATIENT-LVL II: CPT | Mod: PBBFAC,,,

## 2018-03-14 PROCEDURE — 96900 ACTINOTHERAPY UV LIGHT: CPT | Mod: S$GLB,,, | Performed by: DERMATOLOGY

## 2018-03-14 NOTE — PROGRESS NOTES
Light tx 27 for vitiligo. NB-UVB to body at 700 mj with eyes shielded. No erythema with last dose.

## 2018-03-19 ENCOUNTER — CLINICAL SUPPORT (OUTPATIENT)
Dept: DERMATOLOGY | Facility: CLINIC | Age: 63
End: 2018-03-19
Payer: COMMERCIAL

## 2018-03-19 DIAGNOSIS — L80 VITILIGO: ICD-10-CM

## 2018-03-19 PROCEDURE — 99999 PR PBB SHADOW E&M-EST. PATIENT-LVL II: CPT | Mod: PBBFAC,,,

## 2018-03-19 PROCEDURE — 96900 ACTINOTHERAPY UV LIGHT: CPT | Mod: S$GLB,,, | Performed by: DERMATOLOGY

## 2018-03-21 ENCOUNTER — CLINICAL SUPPORT (OUTPATIENT)
Dept: DERMATOLOGY | Facility: CLINIC | Age: 63
End: 2018-03-21
Payer: COMMERCIAL

## 2018-03-21 DIAGNOSIS — L80 VITILIGO: ICD-10-CM

## 2018-03-21 PROCEDURE — 96900 ACTINOTHERAPY UV LIGHT: CPT | Mod: S$GLB,,, | Performed by: DERMATOLOGY

## 2018-03-21 PROCEDURE — 99999 PR PBB SHADOW E&M-EST. PATIENT-LVL II: CPT | Mod: PBBFAC,,,

## 2018-03-26 ENCOUNTER — CLINICAL SUPPORT (OUTPATIENT)
Dept: DERMATOLOGY | Facility: CLINIC | Age: 63
End: 2018-03-26
Payer: COMMERCIAL

## 2018-03-26 DIAGNOSIS — L80 VITILIGO: ICD-10-CM

## 2018-03-26 PROCEDURE — 96900 ACTINOTHERAPY UV LIGHT: CPT | Mod: S$GLB,,, | Performed by: DERMATOLOGY

## 2018-03-26 PROCEDURE — 99999 PR PBB SHADOW E&M-EST. PATIENT-LVL II: CPT | Mod: PBBFAC,,,

## 2018-03-27 ENCOUNTER — OFFICE VISIT (OUTPATIENT)
Dept: UROLOGY | Facility: CLINIC | Age: 63
End: 2018-03-27
Payer: COMMERCIAL

## 2018-03-27 VITALS
HEIGHT: 63 IN | BODY MASS INDEX: 29.64 KG/M2 | DIASTOLIC BLOOD PRESSURE: 86 MMHG | HEART RATE: 86 BPM | SYSTOLIC BLOOD PRESSURE: 155 MMHG | WEIGHT: 167.31 LBS

## 2018-03-27 DIAGNOSIS — I10 BENIGN ESSENTIAL HYPERTENSION: Chronic | ICD-10-CM

## 2018-03-27 DIAGNOSIS — Z87.891 FORMER SMOKER: ICD-10-CM

## 2018-03-27 DIAGNOSIS — R35.0 URINARY FREQUENCY: Primary | ICD-10-CM

## 2018-03-27 PROCEDURE — 3077F SYST BP >= 140 MM HG: CPT | Mod: CPTII,S$GLB,, | Performed by: UROLOGY

## 2018-03-27 PROCEDURE — 99999 PR PBB SHADOW E&M-EST. PATIENT-LVL III: CPT | Mod: PBBFAC,,, | Performed by: UROLOGY

## 2018-03-27 PROCEDURE — 3079F DIAST BP 80-89 MM HG: CPT | Mod: CPTII,S$GLB,, | Performed by: UROLOGY

## 2018-03-27 PROCEDURE — 99213 OFFICE O/P EST LOW 20 MIN: CPT | Mod: S$GLB,,, | Performed by: UROLOGY

## 2018-03-27 NOTE — PROGRESS NOTES
Subjective:       Patient ID: Nga Livingston is a 62 y.o. female.    Chief Complaint: urinary frequency.    HPI Comments: Nga Livingston is a 62 y.o. Female here for follow up of urinary frequency.  Had microhematuria with negative workup 7/15.  She had urinary frequency, urgency, urge incontinence, resolved with oxybutynin XR 5mg.   She is on oxybutynin XR 5mg.   Nocturia not present if limits fluids. She does wear a panty liner.  No dysuria. No gross hematuria.   3 RBC per HPF 4/15. 7/16 ua negative.   Former smoker.    Past Medical History:   Diagnosis Date    Depression     High blood cholesterol     Hypertension     Osteoporosis 3/26/15    outside records from [facility] - T score of L2 is -2.5       Past Surgical History:   Procedure Laterality Date    TUBAL LIGATION         Family History   Problem Relation Age of Onset    Hypertension Mother     Stroke Mother     Hypothyroidism Mother     Diabetes Sister     Hypothyroidism Sister     Hypothyroidism Maternal Aunt     Diabetes Maternal Uncle     Hypothyroidism Maternal Uncle     Breast cancer Neg Hx     Colon cancer Neg Hx        Social History     Social History    Marital status: Single     Spouse name: N/A    Number of children: N/A    Years of education: N/A     Occupational History    retired teacher      Social History Main Topics    Smoking status: Former Smoker     Packs/day: 0.30     Years: 10.00     Quit date: 4/7/2000    Smokeless tobacco: Never Used    Alcohol use 1.2 oz/week     2 Glasses of wine per week    Drug use: No    Sexual activity: No      Comment: Has not been sexually active x 3 years     Other Topics Concern    Not on file     Social History Narrative    No narrative on file       Allergies:  Iodinated contrast- oral and iv dye    Medications:    Current Outpatient Prescriptions:     aspirin 81 MG Chew, Take 81 mg by mouth once daily., Disp: , Rfl:     atorvastatin (LIPITOR) 40 MG tablet, Take 40 mg by mouth once  daily., Disp: , Rfl: 0    biotin 1 mg tablet, Take 1,000 mcg by mouth once daily., Disp: , Rfl:     carvedilol (COREG) 25 MG tablet, take 1 tablet by mouth twice a day with meals, Disp: 60 tablet, Rfl: 11    cetirizine (ZYRTEC) 10 MG tablet, Take 10 mg by mouth once daily., Disp: , Rfl:     ciclopirox (PENLAC) 8 % Soln, Apply daily to affected nail. Must remove with rubbing alcohol once weekly and then re-start., Disp: 1 Bottle, Rfl: 5    diphenhydrAMINE (BENADRYL) 25 mg capsule, Take 25 mg by mouth nightly., Disp: , Rfl:     esomeprazole (NEXIUM) 40 MG capsule, take 1 capsule by mouth every morning, Disp: 90 capsule, Rfl: 3    fish oil-omega-3 fatty acids 300-1,000 mg capsule, Take 2 g by mouth once daily., Disp: , Rfl:     losartan (COZAAR) 25 MG tablet, take 1 tablet by mouth every evening, Disp: 90 tablet, Rfl: 3    oxybutynin (DITROPAN-XL) 5 MG TR24, take 1 tablet by mouth once daily, Disp: 30 tablet, Rfl: 11    RESTASIS 0.05 % ophthalmic emulsion, Place 1 drop into both eyes as needed. , Disp: , Rfl:     triamcinolone (NASACORT) 55 mcg nasal inhaler, 2 sprays by Nasal route once daily., Disp: 16.5 g, Rfl: 1    vitamin D 1000 units Tab, Take 185 mg by mouth every other day. , Disp: , Rfl:     vitamin E 100 UNIT capsule, Take 100 Units by mouth once daily., Disp: , Rfl:     econazole nitrate 1 % cream, Apply topically 2 (two) times daily. X 4 wks to bottoms and sides of feet, Disp: 85 g, Rfl: 2      Review of Systems   Constitutional: Negative for fever, chills and unexpected weight change.   HENT: Negative for nosebleeds.    Eyes: Negative for visual disturbance.   Respiratory: Negative for chest tightness.    Cardiovascular: Negative for chest pain.   Gastrointestinal: Negative for diarrhea.   Genitourinary: Negative for frequency, urgency.  Negative for dysuria.   Musculoskeletal: Negative for myalgias.   Skin: Negative for rash.   Neurological: Negative for seizures.   Hematological: Does not  bruise/bleed easily.   Psychiatric/Behavioral: Negative for behavioral problems.       Objective:      Physical Exam   Vitals reviewed.  Constitutional: She is oriented to person, place, and time. She appears well-developed and well-nourished.   HENT:   Head: Normocephalic and atraumatic.   Eyes: No scleral icterus.   Cardiovascular: Normal rate, regular rhythm and normal heart sounds.    Pulmonary/Chest: Effort normal. No respiratory distress.   Abdominal: Soft. She exhibits no distension and no mass. There is no tenderness.   Musculoskeletal: She exhibits no tenderness.   Lymphadenopathy:     She has no cervical adenopathy.   Neurological: She is alert and oriented to person, place, and time.   Skin: Skin is warm and dry. No rash noted.   Psychiatric: She has a normal mood and affect.     urine dip clear  Assessment:       1. Microhematuria    2. Former smoker    3. Urinary frequency    4.   Elevated blood pressure  Plan:   Continue oxybutynin XR 5mg. Patient does not want to increase dose.   Up to date with all health maintenance.  She is trying to exercise more.  Carotid stenosis - stable.   F/u 1 year.   BP running 130's/70 at home. She will recheck at home.

## 2018-03-28 ENCOUNTER — CLINICAL SUPPORT (OUTPATIENT)
Dept: DERMATOLOGY | Facility: CLINIC | Age: 63
End: 2018-03-28
Payer: COMMERCIAL

## 2018-03-28 DIAGNOSIS — L80 VITILIGO: ICD-10-CM

## 2018-03-28 PROCEDURE — 99999 PR PBB SHADOW E&M-EST. PATIENT-LVL II: CPT | Mod: PBBFAC,,,

## 2018-03-28 PROCEDURE — 99499 UNLISTED E&M SERVICE: CPT | Mod: S$GLB,,, | Performed by: DERMATOLOGY

## 2018-03-28 NOTE — PROGRESS NOTES
Light tx 31 for vitiligo. NB-UVB to body at 900 mj with eyes shielded. No erythema with last dose.

## 2018-04-04 ENCOUNTER — CLINICAL SUPPORT (OUTPATIENT)
Dept: DERMATOLOGY | Facility: CLINIC | Age: 63
End: 2018-04-04
Payer: COMMERCIAL

## 2018-04-04 DIAGNOSIS — L80 VITILIGO: ICD-10-CM

## 2018-04-04 PROCEDURE — 96900 ACTINOTHERAPY UV LIGHT: CPT | Mod: S$GLB,,, | Performed by: DERMATOLOGY

## 2018-04-04 PROCEDURE — 99999 PR PBB SHADOW E&M-EST. PATIENT-LVL II: CPT | Mod: PBBFAC,,,

## 2018-04-09 ENCOUNTER — OFFICE VISIT (OUTPATIENT)
Dept: GASTROENTEROLOGY | Facility: CLINIC | Age: 63
End: 2018-04-09
Payer: COMMERCIAL

## 2018-04-09 ENCOUNTER — CLINICAL SUPPORT (OUTPATIENT)
Dept: DERMATOLOGY | Facility: CLINIC | Age: 63
End: 2018-04-09
Payer: COMMERCIAL

## 2018-04-09 VITALS
WEIGHT: 165.81 LBS | HEIGHT: 63 IN | SYSTOLIC BLOOD PRESSURE: 118 MMHG | DIASTOLIC BLOOD PRESSURE: 72 MMHG | BODY MASS INDEX: 29.38 KG/M2 | HEART RATE: 67 BPM

## 2018-04-09 DIAGNOSIS — L80 VITILIGO: ICD-10-CM

## 2018-04-09 DIAGNOSIS — R07.9 CHEST PAIN, UNSPECIFIED TYPE: Primary | ICD-10-CM

## 2018-04-09 PROCEDURE — 99999 PR PBB SHADOW E&M-EST. PATIENT-LVL II: CPT | Mod: PBBFAC,,,

## 2018-04-09 PROCEDURE — 96900 ACTINOTHERAPY UV LIGHT: CPT | Mod: S$GLB,,, | Performed by: DERMATOLOGY

## 2018-04-09 PROCEDURE — 99204 OFFICE O/P NEW MOD 45 MIN: CPT | Mod: S$GLB,,, | Performed by: INTERNAL MEDICINE

## 2018-04-09 PROCEDURE — 99999 PR PBB SHADOW E&M-EST. PATIENT-LVL III: CPT | Mod: PBBFAC,,, | Performed by: INTERNAL MEDICINE

## 2018-04-09 PROCEDURE — 3074F SYST BP LT 130 MM HG: CPT | Mod: CPTII,S$GLB,, | Performed by: INTERNAL MEDICINE

## 2018-04-09 PROCEDURE — 3078F DIAST BP <80 MM HG: CPT | Mod: CPTII,S$GLB,, | Performed by: INTERNAL MEDICINE

## 2018-04-09 NOTE — LETTER
April 9, 2018      Elaine Mahmood MD  [address]  [facility] [address]           [facility]  [address]  [facility] [address]  Phone: [phone]  Fax: [phone]          Patient: Nga Livingston   MR Number: [id number]   YOB: 1955   Date of Visit: 4/9/2018       Dear Dr. Elaine Mahmood:    Thank you for referring Nga Livingston to me for evaluation. Attached you will find relevant portions of my assessment and plan of care.    If you have questions, please do not hesitate to call me. I look forward to following Nga Livingston along with you.    Sincerely,    Jordan Kraft MD    Enclosure  CC:  No Recipients    If you would like to receive this communication electronically, please contact [email or web address] or [phone] to request more information on [facility] access.    For providers and/or their staff who would like to refer a patient to [facility], please contact us through our one-stop-shop provider referral line, [facility], at [phone].    If you feel you have received this communication in error or would no longer like to receive these types of communications, please e-mail [email or web address]

## 2018-04-09 NOTE — PROGRESS NOTES
Subjective:       Patient ID: Nga Livingston is a 62 y.o. female.    Chief Complaint: GI Problem    HPI  Review of Systems   Constitutional: Negative for activity change, appetite change, chills, diaphoresis, fatigue, fever and unexpected weight change.   HENT: Negative for congestion, ear pain, mouth sores, nosebleeds, postnasal drip, rhinorrhea, sinus pressure, sore throat, trouble swallowing and voice change.    Eyes: Negative for pain.   Respiratory: Negative for cough, shortness of breath and wheezing.    Cardiovascular: Negative for chest pain, palpitations and leg swelling.   Genitourinary: Negative for difficulty urinating, dysuria, flank pain, hematuria and menstrual problem.   Musculoskeletal: Positive for arthralgias. Negative for back pain, gait problem, joint swelling, myalgias and neck pain.   Skin: Negative for rash.   Neurological: Negative for dizziness, tremors, syncope, numbness and headaches.   Hematological: Negative for adenopathy. Does not bruise/bleed easily.   Psychiatric/Behavioral: Negative for agitation, behavioral problems, confusion, decreased concentration and dysphoric mood. The patient is not nervous/anxious.        Objective:      Physical Exam   Constitutional: She is oriented to person, place, and time. She appears well-developed and well-nourished. No distress.   HENT:   Head: Normocephalic and atraumatic.   Right Ear: External ear normal.   Left Ear: External ear normal.   Nose: Nose normal.   Mouth/Throat: Oropharynx is clear and moist. No oropharyngeal exudate.   Eyes: Conjunctivae are normal. Pupils are equal, round, and reactive to light. No scleral icterus.   Neck: Normal range of motion. Neck supple. No thyromegaly present.   Cardiovascular: Normal rate, regular rhythm, normal heart sounds and intact distal pulses.  Exam reveals no gallop.    No murmur heard.  Pulmonary/Chest: Effort normal and breath sounds normal. She has no wheezes. She has no rales.   Abdominal:  Soft. Bowel sounds are normal. She exhibits no distension and no mass. There is no tenderness. There is no rebound and no guarding. No hernia.   Musculoskeletal: Normal range of motion. She exhibits no edema or tenderness.   Lymphadenopathy:     She has no cervical adenopathy.   Neurological: She is alert and oriented to person, place, and time. No cranial nerve deficit.   Skin: Skin is warm and dry. No rash noted.   Psychiatric: She has a normal mood and affect. Her behavior is normal. Judgment and thought content normal.       Assessment:       1. Chest pain, unspecified type        Plan:         This is a new patient visit.      HISTORY OF PRESENT ILLNESS:  Nga Livingston is a 62-year-old lady with a chief   complaint of throat and chest pain.  This has been present for several months.    It starts in the throat and goes down into the substernal area.  Sometimes it   radiates to the back.  It has been occurring for several months.  When it first   started occurring, she was actually taking Nexium every day and that would not   prevent the attacks.  She is now not taking the Nexium, but very rarely.  These   attacks are very brief.  They do not occur every day.  They are not   postprandial.  They tend to occur while sitting up.  When she has attacks and it   only lasts seconds, she says that she is afraid to move.  She gets relief by   sipping warm water with lemon.  It is never exertional.  There is no typical   heartburn, pyrosis or regurgitation.  She does describe dysphagia for meats and   previously pills.  Her bowel movements are normal.  There is no melena.    REVIEW OF SYSTEMS:  Otherwise negative in terms of atypical reflux symptoms like   hoarseness, sore throat, coughing or wheezing.  Review of labs shows hemoglobin   of 12, but she is stable at 12 with normal iron indices.    ASSESSMENT AND DECISION MAKIN.  Throat and chest pain.  This is atypical pain.  I doubt it is due to   esophageal reflux.  She  has been on Nexium every day.  It did not prevent the   pain nor has it become more frequent now that she is not taking Nexium every   day.  She is concerned about possible Nexium side effects and that is why she   has stopped it.  With the intermittent dysphagia for solid food, I think it is   reasonable to do an EGD.  I have asked her to stay off the Nexium until we do   the EGD that will give us a chance to assess if there is any reflux related   injury.  If there is, then we will have justification for being on aggressive   medicine.  I doubt that esophageal manometry would help us on this case.  If the   EGD is negative and the symptoms persist, we can consider a barium swallow test   later.  2.  History of colon polyps.  She had her last scope in 2015.  She is covered   there.      NABIL/IN  dd: 04/09/2018 09:28:56 (CDT)  td: 04/10/2018 03:30:09 (CDT)  Doc ID   #[id number]  Job ID #[id number]    CC:

## 2018-04-20 ENCOUNTER — TELEPHONE (OUTPATIENT)
Dept: ENDOSCOPY | Facility: HOSPITAL | Age: 63
End: 2018-04-20

## 2018-06-22 ENCOUNTER — OFFICE VISIT (OUTPATIENT)
Dept: INTERNAL MEDICINE | Facility: CLINIC | Age: 63
End: 2018-06-22
Payer: COMMERCIAL

## 2018-06-22 ENCOUNTER — TELEPHONE (OUTPATIENT)
Dept: INTERNAL MEDICINE | Facility: CLINIC | Age: 63
End: 2018-06-22

## 2018-06-22 VITALS
BODY MASS INDEX: 30.62 KG/M2 | HEIGHT: 62 IN | TEMPERATURE: 99 F | SYSTOLIC BLOOD PRESSURE: 118 MMHG | WEIGHT: 166.38 LBS | DIASTOLIC BLOOD PRESSURE: 70 MMHG | HEART RATE: 60 BPM

## 2018-06-22 DIAGNOSIS — I70.0 AORTIC ATHEROSCLEROSIS: ICD-10-CM

## 2018-06-22 DIAGNOSIS — E78.5 HYPERLIPIDEMIA, UNSPECIFIED HYPERLIPIDEMIA TYPE: Chronic | ICD-10-CM

## 2018-06-22 DIAGNOSIS — I10 BENIGN ESSENTIAL HYPERTENSION: Primary | Chronic | ICD-10-CM

## 2018-06-22 DIAGNOSIS — I77.9 BILATERAL CAROTID ARTERY DISEASE: ICD-10-CM

## 2018-06-22 DIAGNOSIS — K22.89 ESOPHAGEAL PAIN: ICD-10-CM

## 2018-06-22 PROCEDURE — 99999 PR PBB SHADOW E&M-EST. PATIENT-LVL III: CPT | Mod: PBBFAC,,, | Performed by: INTERNAL MEDICINE

## 2018-06-22 PROCEDURE — 3078F DIAST BP <80 MM HG: CPT | Mod: CPTII,S$GLB,, | Performed by: INTERNAL MEDICINE

## 2018-06-22 PROCEDURE — 99214 OFFICE O/P EST MOD 30 MIN: CPT | Mod: S$GLB,,, | Performed by: INTERNAL MEDICINE

## 2018-06-22 PROCEDURE — 3074F SYST BP LT 130 MM HG: CPT | Mod: CPTII,S$GLB,, | Performed by: INTERNAL MEDICINE

## 2018-06-22 PROCEDURE — 3008F BODY MASS INDEX DOCD: CPT | Mod: CPTII,S$GLB,, | Performed by: INTERNAL MEDICINE

## 2018-06-22 NOTE — PROGRESS NOTES
"Subjective:       Patient ID: Nga Livingston is a 62 y.o. female.    Chief Complaint: HTN f/u    HPI   HTN - coreg 25mg BID, losartan 25mg daily. Has not been checking BP at home but compliant w/ medicines.   US B carotids 3/22/17 - 20-39% R carotid artery stenosis and 40-49% L ICA stenosis.  HLD - atorvastatin 40mg daily. Pt was on 80mg daily but Dr. Buck, her cardiologist, decreased her back to the 40mg daily. He does have copies of her US of carotids.     At last visit, c/o esophageal pain. Seen outside cards and told not cardiac related. Takes nexium 40mg qam but then stopped; however there was no change in symptoms. Referred to GI at last visit. Saw Dr. Jordan Kraft 4/9/18. Plan for EGD (not done yet - scheduled for August). Off nexium till procedure.  CXR 2/2/18 - chest clear. DJD and aortic plaque. On asa 81 and atorva 40.     Saw Dr. Urias 3/7/18 for urinary frequency and microhematuria. Cont oxybutynin XR 5mg daily. F/u in 1 yr.    Weight stable. Has been exercising with the kids in the morning but not at the gym - jumping jacks, rotations, running in place.     Review of Systems   Constitutional: Negative.    HENT: Negative.    Eyes: Negative for visual disturbance.   Respiratory: Negative for cough, shortness of breath and wheezing.    Cardiovascular: Negative for chest pain, palpitations and leg swelling.   Gastrointestinal: Negative.    Genitourinary: Positive for frequency (controlled on oxybutynin).   Musculoskeletal: Negative.    Skin: Negative for rash.   Neurological: Positive for numbness (in the hands. does not want EMG. never tried wrist brace.). Negative for dizziness, weakness (not dropping things out of the hands) and light-headedness.   Psychiatric/Behavioral: Negative for dysphoric mood. The patient is not nervous/anxious.          Objective:      Physical Exam    /70   Pulse 60   Temp 98.5 °F (36.9 °C)   Ht 5' 2" (1.575 m)   Wt 75.5 kg (166 lb 6.4 oz)   LMP 03/22/2007 "   BMI 30.43 kg/m²     Gen - A+OX4, NAD  HEENT - PERRL, OP clear. MMM.   Neck - no LAD. Thyroid fullness (US done in 7/2017)  CV - RRR, no m/r  Chest - CTAB, no wheezing/rhonchi  Abd - S/NT/ND/+BS  Ext -2 + B radial pulses. No LE edema.   MSK - good hand . 5/5 bue strength. 2+ DTRs.   Skin - no rash.     Last labs reviewed.    Assessment/Plan     Nga was seen today for follow-up.    Diagnoses and all orders for this visit:    Benign essential hypertension - Stable and controlled. Continue current medications. Pt is interested in digital HTN. Given info and will walk to [facility].     Hyperlipidemia, unspecified hyperlipidemia type - cont atorva 40mg daily.     Bilateral carotid artery disease - cont asa and atorva. Repeat US next yr.     Esophageal pain - pt scheduled EGD for Aug.     Aortic atherosclerosis - cont asa 81 and atorva 40mg daily.    Pt does not want additional medicines for numbness in the hands. Discussed correct ergonomics. Recommended OTC wrist braces at night.     Follow-up in about 6 months (around 12/22/2018).      Elaine Mahmood MD  Department of Internal Medicine - [facility]  7:05 AM

## 2018-06-22 NOTE — TELEPHONE ENCOUNTER
----- Message from Ariana Marinelli sent at 6/21/2018  2:46 PM CDT -----  Contact: self/[phone]  Patient is returning a phone call.  Who left a message for the patient: elvia  Does patient know what this is regarding:  no   Comments: thank you

## 2018-07-30 ENCOUNTER — PATIENT MESSAGE (OUTPATIENT)
Dept: ADMINISTRATIVE | Facility: OTHER | Age: 63
End: 2018-07-30

## 2018-08-07 ENCOUNTER — PATIENT MESSAGE (OUTPATIENT)
Dept: ADMINISTRATIVE | Facility: OTHER | Age: 63
End: 2018-08-07

## 2018-08-14 ENCOUNTER — HOSPITAL ENCOUNTER (OUTPATIENT)
Facility: HOSPITAL | Age: 63
Discharge: HOME | End: 2018-08-14
Attending: INTERNAL MEDICINE | Admitting: INTERNAL MEDICINE
Payer: COMMERCIAL

## 2018-08-14 ENCOUNTER — ANESTHESIA EVENT (OUTPATIENT)
Dept: ENDOSCOPY | Facility: HOSPITAL | Age: 63
End: 2018-08-14
Payer: COMMERCIAL

## 2018-08-14 ENCOUNTER — ANESTHESIA (OUTPATIENT)
Dept: ENDOSCOPY | Facility: HOSPITAL | Age: 63
End: 2018-08-14
Payer: COMMERCIAL

## 2018-08-14 VITALS
HEART RATE: 75 BPM | BODY MASS INDEX: 30.36 KG/M2 | DIASTOLIC BLOOD PRESSURE: 84 MMHG | WEIGHT: 165 LBS | RESPIRATION RATE: 18 BRPM | HEIGHT: 62 IN | TEMPERATURE: 98 F | OXYGEN SATURATION: 99 % | SYSTOLIC BLOOD PRESSURE: 157 MMHG

## 2018-08-14 DIAGNOSIS — R07.9 CHEST PAIN, UNSPECIFIED TYPE: Primary | ICD-10-CM

## 2018-08-14 PROCEDURE — 25000003 PHARM REV CODE 250: Performed by: INTERNAL MEDICINE

## 2018-08-14 PROCEDURE — 37000008 HC ANESTHESIA 1ST 15 MINUTES: Performed by: INTERNAL MEDICINE

## 2018-08-14 PROCEDURE — C1726 CATH, BAL DIL, NON-VASCULAR: HCPCS | Performed by: INTERNAL MEDICINE

## 2018-08-14 PROCEDURE — 25000003 PHARM REV CODE 250: Performed by: NURSE ANESTHETIST, CERTIFIED REGISTERED

## 2018-08-14 PROCEDURE — E9220 PRA ENDO ANESTHESIA: HCPCS | Mod: ,,, | Performed by: NURSE ANESTHETIST, CERTIFIED REGISTERED

## 2018-08-14 PROCEDURE — 43249 ESOPH EGD DILATION <30 MM: CPT | Performed by: INTERNAL MEDICINE

## 2018-08-14 PROCEDURE — 63600175 PHARM REV CODE 636 W HCPCS: Performed by: NURSE ANESTHETIST, CERTIFIED REGISTERED

## 2018-08-14 PROCEDURE — 37000009 HC ANESTHESIA EA ADD 15 MINS: Performed by: INTERNAL MEDICINE

## 2018-08-14 PROCEDURE — 43249 ESOPH EGD DILATION <30 MM: CPT | Mod: ,,, | Performed by: INTERNAL MEDICINE

## 2018-08-14 RX ORDER — PROPOFOL 10 MG/ML
VIAL (ML) INTRAVENOUS
Status: DISCONTINUED | OUTPATIENT
Start: 2018-08-14 | End: 2018-08-14

## 2018-08-14 RX ORDER — LIDOCAINE HCL/PF 100 MG/5ML
SYRINGE (ML) INTRAVENOUS
Status: DISCONTINUED | OUTPATIENT
Start: 2018-08-14 | End: 2018-08-14

## 2018-08-14 RX ORDER — PROPOFOL 10 MG/ML
VIAL (ML) INTRAVENOUS CONTINUOUS PRN
Status: DISCONTINUED | OUTPATIENT
Start: 2018-08-14 | End: 2018-08-14

## 2018-08-14 RX ORDER — SODIUM CHLORIDE 9 MG/ML
INJECTION, SOLUTION INTRAVENOUS CONTINUOUS
Status: DISCONTINUED | OUTPATIENT
Start: 2018-08-14 | End: 2018-08-14 | Stop reason: HOSPADM

## 2018-08-14 RX ORDER — SODIUM CHLORIDE 0.9 % (FLUSH) 0.9 %
3 SYRINGE (ML) INJECTION
Status: DISCONTINUED | OUTPATIENT
Start: 2018-08-14 | End: 2018-08-14 | Stop reason: HOSPADM

## 2018-08-14 RX ORDER — GLYCOPYRROLATE 0.2 MG/ML
INJECTION INTRAMUSCULAR; INTRAVENOUS
Status: DISCONTINUED | OUTPATIENT
Start: 2018-08-14 | End: 2018-08-14

## 2018-08-14 RX ADMIN — SODIUM CHLORIDE: 0.9 INJECTION, SOLUTION INTRAVENOUS at 08:08

## 2018-08-14 RX ADMIN — GLYCOPYRROLATE 0.2 MG: 0.2 INJECTION, SOLUTION INTRAMUSCULAR; INTRAVENOUS at 09:08

## 2018-08-14 RX ADMIN — SODIUM CHLORIDE: 0.9 INJECTION, SOLUTION INTRAVENOUS at 09:08

## 2018-08-14 RX ADMIN — PROPOFOL 150 MCG/KG/MIN: 10 INJECTION, EMULSION INTRAVENOUS at 09:08

## 2018-08-14 RX ADMIN — LIDOCAINE HYDROCHLORIDE 50 MG: 20 INJECTION, SOLUTION INTRAVENOUS at 09:08

## 2018-08-14 RX ADMIN — PROPOFOL 80 MG: 10 INJECTION, EMULSION INTRAVENOUS at 09:08

## 2018-08-14 NOTE — PLAN OF CARE
Dr. Kraft at bedside reviewing procedure with pt and uncle. Discharge instructions given and explained to pt and uncle. Both verbalize understanding.

## 2018-08-14 NOTE — H&P
[facility]  History & Physical    Subjective:      Chief Complaint/Reason for Admission: chest pain, dysphagia    Nga Livingston is a 62 y.o. female.    Past Medical History:   Diagnosis Date    Depression     High blood cholesterol     Hypertension     Osteoporosis 3/26/15    outside records from [facility] - T score of L2 is -2.5     Past Surgical History:   Procedure Laterality Date    TUBAL LIGATION       Family History   Problem Relation Age of Onset    Hypertension Mother     Stroke Mother     Hypothyroidism Mother     Diabetes Sister     Hypothyroidism Sister     Breast cancer Sister     Cancer Sister         bone cancer    Hypothyroidism Maternal Aunt     Diabetes Maternal Uncle     Hypothyroidism Maternal Uncle     Colon cancer Neg Hx      Social History     Tobacco Use    Smoking status: Former Smoker     Packs/day: 0.30     Years: 10.00     Pack years: 3.00     Last attempt to quit: 2000     Years since quittin.3    Smokeless tobacco: Never Used   Substance Use Topics    Alcohol use: Yes     Alcohol/week: 1.2 oz     Types: 2 Glasses of wine per week    Drug use: No       PTA Medications   Medication Sig    atorvastatin (LIPITOR) 40 MG tablet Take 40 mg by mouth once daily.    biotin 1 mg tablet Take 1,000 mcg by mouth once daily.    carvedilol (COREG) 25 MG tablet take 1 tablet by mouth twice a day with meals    cetirizine (ZYRTEC) 10 MG tablet Take 10 mg by mouth once daily.    diphenhydrAMINE (BENADRYL) 25 mg capsule Take 25 mg by mouth nightly.    fish oil-omega-3 fatty acids 300-1,000 mg capsule Take 2 g by mouth once daily.    losartan (COZAAR) 25 MG tablet take 1 tablet by mouth every evening    oxybutynin (DITROPAN-XL) 5 MG TR24 take 1 tablet by mouth once daily    triamcinolone (NASACORT) 55 mcg nasal inhaler 2 sprays by Nasal route once daily.    vitamin D 1000 units Tab Take 185 mg by mouth every other day.     vitamin E 100 UNIT capsule  Take 100 Units by mouth once daily.    aspirin 81 MG Chew Take 81 mg by mouth once daily.    ciclopirox (PENLAC) 8 % Soln Apply daily to affected nail. Must remove with rubbing alcohol once weekly and then re-start.    econazole nitrate 1 % cream Apply topically 2 (two) times daily. X 4 wks to bottoms and sides of feet    RESTASIS 0.05 % ophthalmic emulsion Place 1 drop into both eyes as needed.      Review of patient's allergies indicates:   Allergen Reactions    Iodinated contrast- oral and iv dye Diarrhea and Rash        Review of Systems   Respiratory: Negative.    Cardiovascular: Negative.        Objective:      Vital Signs (Most Recent)  Temp: 98.1 °F (36.7 °C) (08/14/18 0850)  Pulse: (!) 59 (08/14/18 0850)  Resp: 18 (08/14/18 0850)  BP: (!) 163/74 (08/14/18 0850)  SpO2: 98 % (08/14/18 0850)    Vital Signs Range (Last 24H):  Temp:  [98.1 °F (36.7 °C)]   Pulse:  [59]   Resp:  [18]   BP: (163)/(74)   SpO2:  [98 %]     Physical Exam   Constitutional: She is oriented to person, place, and time.   Neck: Normal range of motion.   Cardiovascular: Normal rate and regular rhythm.   Pulmonary/Chest: Effort normal and breath sounds normal.   Neurological: She is alert and oriented to person, place, and time.       Data Review:     ECG: .     Assessment:      There are no hospital problems to display for this patient.      Plan:    Indication for procedure:    ASA:II  Airway normal  Malampati class:per anes    Personal and family history negative for anesthesia problems    Plan:egd  Anesthesia plan: general

## 2018-08-14 NOTE — TRANSFER OF CARE
"Anesthesia Transfer of Care Note    Patient: Nga Livingston    Procedure(s) Performed: Procedure(s) (LRB):  ESOPHAGOGASTRODUODENOSCOPY (EGD) (N/A)    Patient location: PACU    Anesthesia Type: general    Transport from OR: Transported from OR on room air with adequate spontaneous ventilation    Post pain: adequate analgesia    Post assessment: no apparent anesthetic complications    Post vital signs: stable    Level of consciousness: awake    Nausea/Vomiting: no nausea/vomiting    Complications: none    Transfer of care protocol was followed      Last vitals:   Visit Vitals  BP (!) 163/74   Pulse (!) 59   Temp 36.7 °C (98.1 °F)   Resp 18   Ht 5' 2" (1.575 m)   Wt 74.8 kg (165 lb)   LMP 03/22/2007   SpO2 98%   Breastfeeding? No   BMI 30.18 kg/m²     "

## 2018-08-14 NOTE — [employer] PATIENT INSTRUCTIONS
Discharge Summary/Instructions after an Endoscopic Procedure  Patient Name: Nga Livingston  Patient MRN: [id number]  Patient YOB: 1955 Tuesday, August 14, 2018  Jordan Kraft MD  RESTRICTIONS:  During your procedure today, you received medications for sedation.  These   medications may affect your judgment, balance and coordination.  Therefore,   for 24 hours, you have the following restrictions:   - DO NOT drive a car, operate machinery, make legal/financial decisions,   sign important papers or drink alcohol.    ACTIVITY:  Today: no heavy lifting, straining or running due to procedural   sedation/anesthesia.  The following day: return to full activity including work.  DIET:  Eat and drink normally unless instructed otherwise.     TREATMENT FOR COMMON SIDE EFFECTS:  - Mild abdominal pain, nausea, belching, bloating or excessive gas:  rest,   eat lightly and use a heating pad.  - Sore Throat: treat with throat lozenges and/or gargle with warm salt   water.  - Because air was used during the procedure, expelling large amounts of air   from your rectum or belching is normal.  - If a bowel prep was taken, you may not have a bowel movement for 1-3 days.    This is normal.  SYMPTOMS TO WATCH FOR AND REPORT TO YOUR PHYSICIAN:  1. Abdominal pain or bloating, other than gas cramps.  2. Chest pain.  3. Back pain.  4. Signs of infection such as: chills or fever occurring within 24 hours   after the procedure.  5. Rectal bleeding, which would show as bright red, maroon, or black stools.   (A tablespoon of blood from the rectum is not serious, especially if   hemorrhoids are present.)  6. Vomiting.  7. Weakness or dizziness.  GO DIRECTLY TO THE NEAREST EMERGENCY ROOM IF YOU HAVE ANY OF THE FOLLOWING:      Difficulty breathing              Chills and/or fever over 101 F   Persistent vomiting and/or vomiting blood   Severe abdominal pain   Severe chest pain   Black, tarry stools   Bleeding- more than one tablespoon   Any  other symptom or condition that you feel may need urgent attention  Your doctor recommends these additional instructions:  If any biopsies were taken, your doctors clinic will contact you in 1 to 2   weeks with any results.  - Patient has a contact number available for emergencies.  The signs and   symptoms of potential delayed complications were discussed with the   patient.  Return to normal activities tomorrow.  Written discharge   instructions were provided to the patient.   - Discharge patient to home (ambulatory).   - Resume previous diet.   - Continue present medications. now off nexium, consider zantac  - Return to GI clinic PRN.  For questions, problems or results please call your physician - Jordan Kraft MD at Work:  [phone].  [facility], EMERGENCY ROOM PHONE NUMBER: [phone]  IF A COMPLICATION OR EMERGENCY SITUATION ARISES AND YOU ARE UNABLE TO REACH   YOUR PHYSICIAN - GO DIRECTLY TO THE EMERGENCY ROOM.  Jordan Kraft MD  8/14/2018 9:18:17 AM  This report has been verified and signed electronically.  [employer]

## 2018-08-14 NOTE — ANESTHESIA PREPROCEDURE EVALUATION
08/14/2018  Nga Livingston is a 62 y.o., female.    Anesthesia Evaluation    I have reviewed the Patient Summary Reports.     I have reviewed the Medications.     Review of Systems  Anesthesia Hx:  No problems with previous Anesthesia  Denies Family Hx of Anesthesia complications.   Denies Personal Hx of Anesthesia complications.   Hematology/Oncology:  Hematology Normal   Oncology Normal     EENT/Dental:EENT/Dental Normal   Cardiovascular:   Hypertension, well controlled    Pulmonary:  Pulmonary Normal    Renal/:  Renal/ Normal     Hepatic/GI:  Hepatic/GI Normal    Musculoskeletal:   Arthritis     Neurological:  Neurology Normal    Endocrine:  Endocrine Normal    Dermatological:  Skin Normal    Psych:   Psychiatric History depression          Physical Exam  General:  Well nourished       Chest/Lungs:  Chest/Lungs Clear    Heart/Vascular:  Heart Findings: Normal Heart murmur: negative            Anesthesia Plan  Type of Anesthesia, risks & benefits discussed:  Anesthesia Type:  general  Patient's Preference: general  Intra-op Monitoring Plan: standard ASA monitors  Intra-op Monitoring Plan Comments:   Post Op Pain Control Plan:   Post Op Pain Control Plan Comments:   Induction:   IV  Beta Blocker:  Patient is not currently on a Beta-Blocker (No further documentation required).       Informed Consent: Patient understands risks and agrees with Anesthesia plan.  Questions answered. Anesthesia consent signed with patient.  ASA Score: 2     Day of Surgery Review of History & Physical: I have interviewed and examined the patient. I have reviewed the patient's H&P dated:    H&P update referred to the provider.         Ready For Surgery From Anesthesia Perspective.

## 2018-08-14 NOTE — ANESTHESIA POSTPROCEDURE EVALUATION
"Anesthesia Post Evaluation    Patient: Nga Livingston    Procedure(s) Performed: Procedure(s) (LRB):  ESOPHAGOGASTRODUODENOSCOPY (EGD) (N/A)    Final Anesthesia Type: general  Patient location during evaluation: GI PACU  Level of consciousness: awake and alert  Post-procedure vital signs: reviewed and stable  Pain management: adequate  Airway patency: patent  PONV status at discharge: No PONV  Anesthetic complications: no      Cardiovascular status: blood pressure returned to baseline  Respiratory status: unassisted and spontaneous ventilation  Hydration status: euvolemic  Follow-up not needed.        Visit Vitals  BP (!) 157/84 (BP Location: Left arm, Patient Position: Sitting)   Pulse 75   Temp 36.6 °C (97.9 °F) (Temporal)   Resp 18   Ht 5' 2" (1.575 m)   Wt 74.8 kg (165 lb)   LMP 03/22/2007   SpO2 99%   Breastfeeding? No   BMI 30.18 kg/m²       Pain/Olimpia Score: Pain Assessment Performed: Yes (8/14/2018  9:43 AM)  Presence of Pain: denies (8/14/2018  9:43 AM)  Olimpia Score: 10 (8/14/2018  9:33 AM)        "

## 2018-08-14 NOTE — DISCHARGE INSTRUCTIONS

## 2018-08-21 ENCOUNTER — TELEPHONE (OUTPATIENT)
Dept: ENDOSCOPY | Facility: HOSPITAL | Age: 63
End: 2018-08-21

## 2018-09-04 DIAGNOSIS — R35.0 URINARY FREQUENCY: ICD-10-CM

## 2018-09-04 RX ORDER — OXYBUTYNIN CHLORIDE 5 MG/1
5 TABLET, EXTENDED RELEASE ORAL DAILY
Qty: 30 TABLET | Refills: 7 | Status: SHIPPED | OUTPATIENT
Start: 2018-09-04 | End: 2019-05-10 | Stop reason: SDUPTHER

## 2018-11-14 ENCOUNTER — TELEPHONE (OUTPATIENT)
Dept: INTERNAL MEDICINE | Facility: CLINIC | Age: 63
End: 2018-11-14

## 2018-11-14 DIAGNOSIS — Z12.31 VISIT FOR SCREENING MAMMOGRAM: Primary | ICD-10-CM

## 2018-11-14 NOTE — TELEPHONE ENCOUNTER
----- Message from Francy Spear sent at 11/14/2018  2:06 PM CST -----  Contact: [phone]  Patient is requesting mammogram orders, patient stated she needs to have after the 17th of November.  Patient stated she has her mammogram on the [facility] .    Please advise, thank you

## 2018-11-27 ENCOUNTER — HOSPITAL ENCOUNTER (OUTPATIENT)
Dept: RADIOLOGY | Facility: HOSPITAL | Age: 63
Discharge: HOME | End: 2018-11-27
Attending: INTERNAL MEDICINE
Payer: COMMERCIAL

## 2018-11-27 VITALS — WEIGHT: 165 LBS | BODY MASS INDEX: 30.36 KG/M2 | HEIGHT: 62 IN

## 2018-11-27 DIAGNOSIS — Z12.31 VISIT FOR SCREENING MAMMOGRAM: ICD-10-CM

## 2018-11-27 PROCEDURE — 77063 BREAST TOMOSYNTHESIS BI: CPT | Mod: 26,,, | Performed by: RADIOLOGY

## 2018-11-27 PROCEDURE — 77067 SCR MAMMO BI INCL CAD: CPT | Mod: TC

## 2018-11-27 PROCEDURE — 77063 BREAST TOMOSYNTHESIS BI: CPT | Mod: TC

## 2018-11-27 PROCEDURE — 77067 SCR MAMMO BI INCL CAD: CPT | Mod: 26,,, | Performed by: RADIOLOGY

## 2019-01-21 DIAGNOSIS — I10 BENIGN ESSENTIAL HYPERTENSION: ICD-10-CM

## 2019-01-21 RX ORDER — CARVEDILOL 25 MG/1
25 TABLET ORAL 2 TIMES DAILY WITH MEALS
Qty: 60 TABLET | Refills: 11 | Status: SHIPPED | OUTPATIENT
Start: 2019-01-21 | End: 2020-10-01 | Stop reason: SDUPTHER

## 2019-01-21 NOTE — TELEPHONE ENCOUNTER
----- Message from Da Coreas sent at 1/21/2019 10:16 AM CST -----  Contact: Patient [phone]  RX request - refill or new RX.  Is this a refill or new RX:  Refill  RX name and strength: carvedilol (COREG) 25 MG tablet    Pharmacy name and phone # [facility] [address] [place] [address], [place] - [address] AT [facility] [address] [phone] (Phone) [phone] (Fax    Please call an advise  Thank you

## 2019-04-02 ENCOUNTER — OFFICE VISIT (OUTPATIENT)
Dept: UROLOGY | Facility: CLINIC | Age: 64
End: 2019-04-02
Payer: COMMERCIAL

## 2019-04-02 VITALS — BODY MASS INDEX: 28.6 KG/M2 | WEIGHT: 167.56 LBS | HEIGHT: 64 IN

## 2019-04-02 DIAGNOSIS — R35.0 URINARY FREQUENCY: Primary | ICD-10-CM

## 2019-04-02 DIAGNOSIS — I10 BENIGN ESSENTIAL HYPERTENSION: Chronic | ICD-10-CM

## 2019-04-02 DIAGNOSIS — Z87.891 FORMER SMOKER: ICD-10-CM

## 2019-04-02 DIAGNOSIS — R31.29 MICROHEMATURIA: ICD-10-CM

## 2019-04-02 PROCEDURE — 3008F PR BODY MASS INDEX (BMI) DOCUMENTED: ICD-10-PCS | Mod: CPTII,S$GLB,, | Performed by: UROLOGY

## 2019-04-02 PROCEDURE — 99213 PR OFFICE/OUTPT VISIT, EST, LEVL III, 20-29 MIN: ICD-10-PCS | Mod: S$GLB,,, | Performed by: UROLOGY

## 2019-04-02 PROCEDURE — 99999 PR PBB SHADOW E&M-EST. PATIENT-LVL III: CPT | Mod: PBBFAC,,, | Performed by: UROLOGY

## 2019-04-02 PROCEDURE — 99999 PR PBB SHADOW E&M-EST. PATIENT-LVL III: ICD-10-PCS | Mod: PBBFAC,,, | Performed by: UROLOGY

## 2019-04-02 PROCEDURE — 3008F BODY MASS INDEX DOCD: CPT | Mod: CPTII,S$GLB,, | Performed by: UROLOGY

## 2019-04-02 PROCEDURE — 99213 OFFICE O/P EST LOW 20 MIN: CPT | Mod: S$GLB,,, | Performed by: UROLOGY

## 2019-04-02 NOTE — PROGRESS NOTES
Subjective:       Patient ID: Nga Livingston is a 63 y.o. female.    Chief Complaint: urinary frequency.    HPI Comments: Nga Livingston is a 63 y.o. Female here for follow up of urinary frequency.  Had microhematuria with negative workup 7/15.  She had urinary frequency, urgency, urge incontinence, resolved with oxybutynin XR 5mg.   She is on oxybutynin XR 5mg once a week.   Rare urgency  Nocturia not present if limits fluids. She does wear a panty liner. 1-2 at night a couple of times a week. (often associated with iced tea)  No dysuria. No gross hematuria.   3 RBC per HPF 4/15. 7/16 ua negative.   Former smoker.    Past Medical History:   Diagnosis Date    Depression     High blood cholesterol     Hypertension     Osteoporosis 3/26/15    outside records from [facility] - T score of L2 is -2.5       Past Surgical History:   Procedure Laterality Date    COLONOSCOPY N/A 6/16/2015    Performed by Richmond Hernandez MD at [facility] (4TH FLR)    ESOPHAGOGASTRODUODENOSCOPY (EGD) N/A 8/14/2018    Performed by Jordan Kraft MD at [facility] ENDO (4TH FLR)    TUBAL LIGATION         Family History   Problem Relation Age of Onset    Hypertension Mother     Stroke Mother     Hypothyroidism Mother     Diabetes Sister     Hypothyroidism Sister     Breast cancer Sister     Cancer Sister         bone cancer    Hypothyroidism Maternal Aunt     Diabetes Maternal Uncle     Hypothyroidism Maternal Uncle     Colon cancer Neg Hx        Social History     Socioeconomic History    Marital status: Single     Spouse name: Not on file    Number of children: Not on file    Years of education: Not on file    Highest education level: Not on file   Occupational History    Occupation: retired teacher   Social Needs    Financial resource strain: Not on file    Food insecurity:     Worry: Not on file     Inability: Not on file    Transportation needs:     Medical: Not on file     Non-medical: Not on file   Tobacco Use    Smoking status:  Former Smoker     Packs/day: 0.30     Years: 10.00     Pack years: 3.00     Last attempt to quit: 2000     Years since quittin.9    Smokeless tobacco: Never Used   Substance and Sexual Activity    Alcohol use: Yes     Alcohol/week: 1.2 oz     Types: 2 Glasses of wine per week    Drug use: No    Sexual activity: Never     Partners: Male     Comment: Has not been sexually active x 3 years   Lifestyle    Physical activity:     Days per week: Not on file     Minutes per session: Not on file    Stress: Not on file   Relationships    Social connections:     Talks on phone: Not on file     Gets together: Not on file     Attends Orthodox service: Not on file     Active member of club or organization: Not on file     Attends meetings of clubs or organizations: Not on file     Relationship status: Not on file    Intimate partner violence:     Fear of current or ex partner: Not on file     Emotionally abused: Not on file     Physically abused: Not on file     Forced sexual activity: Not on file   Other Topics Concern    Not on file   Social History Narrative    Not on file       Allergies:  Iodinated contrast- oral and iv dye    Medications:    Current Outpatient Medications:     aspirin 81 MG Chew, Take 81 mg by mouth once daily., Disp: , Rfl:     atorvastatin (LIPITOR) 40 MG tablet, Take 40 mg by mouth once daily., Disp: , Rfl: 0    biotin 1 mg tablet, Take 1,000 mcg by mouth once daily., Disp: , Rfl:     carvedilol (COREG) 25 MG tablet, Take 1 tablet (25 mg total) by mouth 2 (two) times daily with meals., Disp: 60 tablet, Rfl: 11    cetirizine (ZYRTEC) 10 MG tablet, Take 10 mg by mouth once daily., Disp: , Rfl:     ciclopirox (PENLAC) 8 % Soln, Apply daily to affected nail. Must remove with rubbing alcohol once weekly and then re-start., Disp: 1 Bottle, Rfl: 5    diphenhydrAMINE (BENADRYL) 25 mg capsule, Take 25 mg by mouth nightly., Disp: , Rfl:     econazole nitrate 1 % cream, Apply topically 2  (two) times daily. X 4 wks to bottoms and sides of feet, Disp: 85 g, Rfl: 2    fish oil-omega-3 fatty acids 300-1,000 mg capsule, Take 2 g by mouth once daily., Disp: , Rfl:     losartan (COZAAR) 25 MG tablet, take 1 tablet by mouth every evening, Disp: 90 tablet, Rfl: 3    oxybutynin (DITROPAN-XL) 5 MG TR24, Take 1 tablet (5 mg total) by mouth once daily., Disp: 30 tablet, Rfl: 7    RESTASIS 0.05 % ophthalmic emulsion, Place 1 drop into both eyes as needed. , Disp: , Rfl:     triamcinolone (NASACORT) 55 mcg nasal inhaler, 2 sprays by Nasal route once daily., Disp: 16.5 g, Rfl: 1    vitamin D 1000 units Tab, Take 185 mg by mouth every other day. , Disp: , Rfl:     vitamin E 100 UNIT capsule, Take 100 Units by mouth once daily., Disp: , Rfl:       Review of Systems   Constitutional: Negative for fever, chills and unexpected weight change.   HENT: Negative for nosebleeds.    Eyes: Negative for visual disturbance.   Respiratory: Negative for chest tightness.    Cardiovascular: Negative for chest pain.   Gastrointestinal: Negative for diarrhea.   Genitourinary: Negative for frequency, urgency.  Negative for dysuria.   Musculoskeletal: Negative for myalgias.   Skin: Negative for rash.   Neurological: Negative for seizures.   Hematological: Does not bruise/bleed easily.   Psychiatric/Behavioral: Negative for behavioral problems.       Objective:      Physical Exam   Vitals reviewed.  Constitutional: She is oriented to person, place, and time. She appears well-developed and well-nourished.   HENT:   Head: Normocephalic and atraumatic.   Eyes: No scleral icterus.   Cardiovascular: Normal rate, regular rhythm and normal heart sounds.    Pulmonary/Chest: Effort normal. No respiratory distress.   Abdominal: Soft. She exhibits no distension and no mass. There is no tenderness.   Musculoskeletal: She exhibits no tenderness.   Lymphadenopathy:     She has no cervical adenopathy.   Neurological: She is alert and oriented to  person, place, and time.   Skin: Skin is warm and dry. No rash noted.   Psychiatric: She has a normal mood and affect.     urine dip trace blood  Assessment:       1. Microhematuria    2. Former smoker    3. Urinary frequency    4.   Elevated blood pressure  Plan:   Continue oxybutynin XR 5mg. Patient does not want to increase dose.   Up to date with all health maintenance.  She is trying to exercise more.  F/u 1 year.   11/2018 last mammogram.   Colonoscopy due 2025.

## 2019-05-10 DIAGNOSIS — R35.0 URINARY FREQUENCY: ICD-10-CM

## 2019-05-13 RX ORDER — OXYBUTYNIN CHLORIDE 5 MG/1
TABLET, EXTENDED RELEASE ORAL
Qty: 30 TABLET | Refills: 0 | Status: SHIPPED | OUTPATIENT
Start: 2019-05-13 | End: 2019-06-10 | Stop reason: SDUPTHER

## 2019-05-15 ENCOUNTER — TELEPHONE (OUTPATIENT)
Dept: INTERNAL MEDICINE | Facility: CLINIC | Age: 64
End: 2019-05-15

## 2019-05-15 NOTE — TELEPHONE ENCOUNTER
----- Message from Lalitha Pérez sent at 5/15/2019  1:07 PM CDT -----  Contact: [phone]  Patient is requesting a call from the office concerning pain in her left knee. She is requesting to have an Xray.    Please advise, thanks

## 2019-05-15 NOTE — TELEPHONE ENCOUNTER
Called and spoke to pt and she stated she is having some knee pain on the side of her left leg some redness but she wanted an Xray of it. I explained she would need to be evaluated for this pt agreed to see FLORESITA Lee tomorrow morning. Scheduled.

## 2019-05-16 ENCOUNTER — HOSPITAL ENCOUNTER (OUTPATIENT)
Dept: RADIOLOGY | Facility: HOSPITAL | Age: 64
Discharge: HOME | End: 2019-05-16
Attending: PHYSICIAN ASSISTANT
Payer: COMMERCIAL

## 2019-05-16 ENCOUNTER — OFFICE VISIT (OUTPATIENT)
Dept: INTERNAL MEDICINE | Facility: CLINIC | Age: 64
End: 2019-05-16
Payer: COMMERCIAL

## 2019-05-16 VITALS
WEIGHT: 169.56 LBS | BODY MASS INDEX: 31.2 KG/M2 | OXYGEN SATURATION: 99 % | DIASTOLIC BLOOD PRESSURE: 84 MMHG | HEART RATE: 51 BPM | SYSTOLIC BLOOD PRESSURE: 124 MMHG | HEIGHT: 62 IN

## 2019-05-16 DIAGNOSIS — M25.562 ACUTE PAIN OF LEFT KNEE: Primary | ICD-10-CM

## 2019-05-16 DIAGNOSIS — M25.562 ACUTE PAIN OF LEFT KNEE: ICD-10-CM

## 2019-05-16 PROCEDURE — 73564 X-RAY EXAM KNEE 4 OR MORE: CPT | Mod: 26,LT,, | Performed by: RADIOLOGY

## 2019-05-16 PROCEDURE — 3079F DIAST BP 80-89 MM HG: CPT | Mod: CPTII,S$GLB,, | Performed by: PHYSICIAN ASSISTANT

## 2019-05-16 PROCEDURE — 73564 X-RAY EXAM KNEE 4 OR MORE: CPT | Mod: TC,LT

## 2019-05-16 PROCEDURE — 73564 XR KNEE COMP 4 OR MORE VIEWS LEFT: ICD-10-PCS | Mod: 26,LT,, | Performed by: RADIOLOGY

## 2019-05-16 PROCEDURE — 3074F PR MOST RECENT SYSTOLIC BLOOD PRESSURE < 130 MM HG: ICD-10-PCS | Mod: CPTII,S$GLB,, | Performed by: PHYSICIAN ASSISTANT

## 2019-05-16 PROCEDURE — 3079F PR MOST RECENT DIASTOLIC BLOOD PRESSURE 80-89 MM HG: ICD-10-PCS | Mod: CPTII,S$GLB,, | Performed by: PHYSICIAN ASSISTANT

## 2019-05-16 PROCEDURE — 99999 PR PBB SHADOW E&M-EST. PATIENT-LVL V: ICD-10-PCS | Mod: PBBFAC,,, | Performed by: PHYSICIAN ASSISTANT

## 2019-05-16 PROCEDURE — 3074F SYST BP LT 130 MM HG: CPT | Mod: CPTII,S$GLB,, | Performed by: PHYSICIAN ASSISTANT

## 2019-05-16 PROCEDURE — 99213 PR OFFICE/OUTPT VISIT, EST, LEVL III, 20-29 MIN: ICD-10-PCS | Mod: S$GLB,,, | Performed by: PHYSICIAN ASSISTANT

## 2019-05-16 PROCEDURE — 99213 OFFICE O/P EST LOW 20 MIN: CPT | Mod: S$GLB,,, | Performed by: PHYSICIAN ASSISTANT

## 2019-05-16 PROCEDURE — 3008F PR BODY MASS INDEX (BMI) DOCUMENTED: ICD-10-PCS | Mod: CPTII,S$GLB,, | Performed by: PHYSICIAN ASSISTANT

## 2019-05-16 PROCEDURE — 3008F BODY MASS INDEX DOCD: CPT | Mod: CPTII,S$GLB,, | Performed by: PHYSICIAN ASSISTANT

## 2019-05-16 PROCEDURE — 99999 PR PBB SHADOW E&M-EST. PATIENT-LVL V: CPT | Mod: PBBFAC,,, | Performed by: PHYSICIAN ASSISTANT

## 2019-05-16 NOTE — PROGRESS NOTES
"Subjective:       Patient ID: Nga Livingston is a 63 y.o. female.    Chief Complaint: Insect Bite (Left knee)    HPI     Established pt of Elaine Mahmood MD (new to me)    C/o left knee pain, described as aching. 3 days ago she noted some redness to her lateral knee, thought she hit her knee, but then wonders if insect bit her as she noticed a "black dot".     Using alcohol rub and advil with moderate relief of pain. Completed augmentin for dental procedure as well. Redness has resolved.     Past Medical History:   Diagnosis Date    Depression     High blood cholesterol     Hypertension     Osteoporosis 3/26/15    outside records from [facility] - T score of L2 is -2.5     Social History     Tobacco Use    Smoking status: Former Smoker     Packs/day: 0.30     Years: 10.00     Pack years: 3.00     Last attempt to quit: 2000     Years since quittin.1    Smokeless tobacco: Never Used   Substance Use Topics    Alcohol use: Yes     Alcohol/week: 1.2 oz     Types: 2 Glasses of wine per week    Drug use: No     Review of patient's allergies indicates:   Allergen Reactions    Iodinated contrast- oral and iv dye Diarrhea and Rash         Review of Systems   Constitutional: Negative for chills, fever and unexpected weight change.   Eyes: Negative for visual disturbance.   Respiratory: Negative for cough and shortness of breath.    Cardiovascular: Negative for chest pain and leg swelling.   Gastrointestinal: Negative for abdominal pain, nausea and vomiting.   Musculoskeletal: Positive for arthralgias.   Skin: Negative for rash.   Neurological: Negative for weakness and headaches.       Objective: /84   Pulse (!) 51   Ht 5' 2" (1.575 m)   Wt 76.9 kg (169 lb 8.5 oz)   LMP 2007   SpO2 99%   BMI 31.01 kg/m²         Physical Exam   Constitutional: She appears well-developed and well-nourished. No distress.   HENT:   Head: Normocephalic and atraumatic.   Mouth/Throat: Oropharynx is clear and moist. "   Cardiovascular: Normal rate and regular rhythm. Exam reveals no friction rub.   No murmur heard.  Pulmonary/Chest: Effort normal and breath sounds normal. She has no wheezes. She has no rales.   Abdominal: Soft. Bowel sounds are normal. There is no tenderness.   Musculoskeletal:        Left knee: She exhibits normal range of motion, no swelling and no erythema. No tenderness found.   Single dark central punctum to lateral knee  No erythema, redness, or swelling.    Lymphadenopathy:     She has no cervical adenopathy.   Neurological: She is alert.   Skin: Skin is warm and dry. Capillary refill takes less than 2 seconds. No rash noted.   Vitals reviewed.                    Assessment:       1. Acute pain of left knee        Plan:       Nga was seen today for insect bite.    Diagnoses and all orders for this visit:    Acute pain of left knee       -  Improved per patient       - Encouraged to continue conservative measures,        rest/ice/NSAIDs prn       - No signs of skin infection      -  Discussed benign nature of likely epidermal inclusion cyst, if becomes more bothersome consider Derm referral for excision  -     X-Ray Knee Complete 4 or More Views Left; Future      Sabina Cruz PA-C

## 2019-06-04 ENCOUNTER — PATIENT OUTREACH (OUTPATIENT)
Dept: ADMINISTRATIVE | Facility: HOSPITAL | Age: 64
End: 2019-06-04

## 2019-06-04 DIAGNOSIS — I10 BENIGN ESSENTIAL HYPERTENSION: ICD-10-CM

## 2019-06-04 DIAGNOSIS — E78.5 HYPERLIPIDEMIA, UNSPECIFIED HYPERLIPIDEMIA TYPE: Primary | ICD-10-CM

## 2019-06-10 DIAGNOSIS — R35.0 URINARY FREQUENCY: ICD-10-CM

## 2019-06-10 RX ORDER — OXYBUTYNIN CHLORIDE 5 MG/1
TABLET, EXTENDED RELEASE ORAL
Qty: 30 TABLET | Refills: 12 | Status: SHIPPED | OUTPATIENT
Start: 2019-06-10 | End: 2020-07-02 | Stop reason: SDUPTHER

## 2019-06-14 NOTE — PROGRESS NOTES
INTERNAL MEDICINE ANNUAL VISIT NOTE      CHIEF COMPLAINT     ANNUAL    HPI     Nga Livingston is a 63 y.o. AA female who presents for her annual exam.    C/o sometimes w/ numbness in the all the fingers almost daily x 3-4 mo. Not always during sleep. Sometimes can occur while just walking. When she wiggles them, it feels better - last maybe 2-3 min. No weakness. No stiffness.   No pain/trauma.     HTN - coreg 25mg BID, losartan 50 mg qd.    HLD - atorva 40mg daily.     US B carotids 2/7/18 - 0-19% R carotid artery stenosis and 20-39% L ICA stenosis.  Follows w/ outside cardiologist about 2 mo ago.     OAB - ditropan xl 5mg daily.    Allergies - nasacort, zyrtec 10mg daily. Helps w/ allergies.     EGD 8/14/18 - small hiatal hernia. LA Grade A reflux esophagitis. Esophageal stenosis s/p dilation.  No acid reflux/epigastric pain. Off nexium. No dysphagia.     CXR 2/2/18 - chest clear. DJD and aortic plaque. On asa 81 and atorva 40.     Exercises - treadmill and resistance training about 4x/week.     Past Medical History:  Past Medical History:   Diagnosis Date    Depression     High blood cholesterol     Hypertension     Osteoporosis 3/26/15    outside records from [facility] - T score of L2 is -2.5       Past Surgical History:  Past Surgical History:   Procedure Laterality Date    COLONOSCOPY N/A 6/16/2015    Performed by [facility] at [facility] ([facility])    ESOPHAGOGASTRODUODENOSCOPY (EGD) N/A 8/14/2018    Performed by Jordan Kraft MD at [facility] END[facility] (4TH FLR)    TUBAL LIGATION         Allergies:  Review of patient's allergies indicates:   Allergen Reactions    Iodinated contrast- oral and iv dye Diarrhea and Rash       meds reviewed.    Family History:  Family History   Problem Relation Age of Onset    Hypertension Mother     Stroke Mother     Hypothyroidism Mother     Diabetes Sister     Hypothyroidism Sister     Breast cancer Sister     Cancer Sister         bone cancer    Hypothyroidism  "Maternal Aunt     Diabetes Maternal Uncle     Hypothyroidism Maternal Uncle     Colon cancer Neg Hx        Social History:  Social History     Tobacco Use    Smoking status: Former Smoker     Packs/day: 0.30     Years: 10.00     Pack years: 3.00     Last attempt to quit: 2000     Years since quittin.2    Smokeless tobacco: Never Used   Substance Use Topics    Alcohol use: Yes     Alcohol/week: 1.2 oz     Types: 2 Glasses of wine per week    Drug use: No       Review of Systems:  Review of Systems   Comprehensive review of systems otherwise negative. See history/subjective section for more details.    Health Maintainence:    reviewed.    PHYSICAL EXAM     /64 (BP Location: Left arm, Patient Position: Sitting, BP Method: Large (Manual))   Pulse 65   Temp 97.4 °F (36.3 °C)   Ht 5' 3" (1.6 m)   Wt 77.8 kg (171 lb 8.3 oz)   LMP 2007   BMI 30.38 kg/m²     GEN - A+OX4, NAD   HEENT - PERRL, EOMI, OP clear. MMM.   Neck - No thyromegaly or cervical LAD. No thyroid masses felt.  CV - RRR, no m/r   Chest - CTAB, no wheezing or rhonchi  Abd - S/NT/ND/+BS.   Ext - 2+BDP and radial pulses. No LE edema.   Neuro - PERRL, EOMI, no nystagmus, eyebrow raise, facial sensation, hearing, m of mastication, smile, palatal raise, shoulder shrug, tongue protrusion symmetric and intact. 5/5 BUE and BLE strength. Sensation to light touch intact throughout. 2+ DTRs. Normal gait. R hand lumbrical strength decreased compared to L. Good hand  B.   MSK - No spinal tenderness to palpation. Normal gait. No knee tenderness/effusion.  Skin - No rash.    LABS     Previous labs reviewed.    ASSESSMENT/PLAN     Nga Livingston is a 63 y.o. female with  Nga was seen today for annual exam.    Diagnoses and all orders for this visit:    Annual physical exam  -     CBC auto differential; Future; Expected date: 2019  -     Comprehensive metabolic panel; Future; Expected date: 2019  -     Hemoglobin A1c; " Future; Expected date: 06/17/2019  -     Lipid panel; Future; Expected date: 06/17/2019  -     TSH; Future; Expected date: 06/17/2019  -     Vitamin D; Future; Expected date: 06/17/2019  -     Folate; Future; Expected date: 06/17/2019  -     Vitamin B12; Future; Expected date: 06/17/2019    Paresthesia of both hands - possibly CTS.  -     EMG W/ ULTRASOUND AND NERVE CONDUCTION TEST 2 Extremities; Future  -     CBC auto differential; Future; Expected date: 06/17/2019  -     Comprehensive metabolic panel; Future; Expected date: 06/17/2019  -     Hemoglobin A1c; Future; Expected date: 06/17/2019  -     TSH; Future; Expected date: 06/17/2019  -     Folate; Future; Expected date: 06/17/2019  -     Vitamin B12; Future; Expected date: 06/17/2019    Benign essential hypertension - Stable and controlled. Continue current medications.  -     Comprehensive metabolic panel; Future; Expected date: 06/17/2019    Hyperlipidemia, unspecified hyperlipidemia type - cont atorva 40mg daily.   -     Comprehensive metabolic panel; Future; Expected date: 06/17/2019  -     Hemoglobin A1c; Future; Expected date: 06/17/2019  -     Lipid panel; Future; Expected date: 06/17/2019    Bilateral carotid artery stenosis  -     Comprehensive metabolic panel; Future; Expected date: 06/17/2019  -     Lipid panel; Future; Expected date: 06/17/2019    Aortic atherosclerosis  -     Comprehensive metabolic panel; Future; Expected date: 06/17/2019  -     Lipid panel; Future; Expected date: 06/17/2019    Other orders  -     losartan (COZAAR) 50 MG tablet    Pt will ask about shingrix at local pharmacy.   RTC in 12 months, sooner if needed and depending on labs.    Elaine Mahmood MD  Department of Internal Medicine - [facility]  12:40 PM

## 2019-06-17 ENCOUNTER — LAB VISIT (OUTPATIENT)
Dept: LAB | Facility: HOSPITAL | Age: 64
End: 2019-06-17
Attending: INTERNAL MEDICINE
Payer: COMMERCIAL

## 2019-06-17 ENCOUNTER — OFFICE VISIT (OUTPATIENT)
Dept: INTERNAL MEDICINE | Facility: CLINIC | Age: 64
End: 2019-06-17
Payer: COMMERCIAL

## 2019-06-17 VITALS
SYSTOLIC BLOOD PRESSURE: 124 MMHG | HEART RATE: 65 BPM | DIASTOLIC BLOOD PRESSURE: 64 MMHG | WEIGHT: 171.5 LBS | HEIGHT: 63 IN | TEMPERATURE: 97 F | BODY MASS INDEX: 30.39 KG/M2

## 2019-06-17 DIAGNOSIS — R20.2 PARESTHESIA OF BOTH HANDS: ICD-10-CM

## 2019-06-17 DIAGNOSIS — E78.5 HYPERLIPIDEMIA, UNSPECIFIED HYPERLIPIDEMIA TYPE: Chronic | ICD-10-CM

## 2019-06-17 DIAGNOSIS — I70.0 AORTIC ATHEROSCLEROSIS: ICD-10-CM

## 2019-06-17 DIAGNOSIS — I10 BENIGN ESSENTIAL HYPERTENSION: Chronic | ICD-10-CM

## 2019-06-17 DIAGNOSIS — Z00.00 ANNUAL PHYSICAL EXAM: Primary | ICD-10-CM

## 2019-06-17 DIAGNOSIS — I65.23 BILATERAL CAROTID ARTERY STENOSIS: ICD-10-CM

## 2019-06-17 DIAGNOSIS — Z00.00 ANNUAL PHYSICAL EXAM: ICD-10-CM

## 2019-06-17 LAB
25(OH)D3+25(OH)D2 SERPL-MCNC: 44 NG/ML (ref 30–96)
ALBUMIN SERPL BCP-MCNC: 4.1 G/DL (ref 3.5–5.2)
ALP SERPL-CCNC: 71 U/L (ref 55–135)
ALT SERPL W/O P-5'-P-CCNC: 18 U/L (ref 10–44)
ANION GAP SERPL CALC-SCNC: 9 MMOL/L (ref 8–16)
AST SERPL-CCNC: 20 U/L (ref 10–40)
BASOPHILS # BLD AUTO: 0.02 K/UL (ref 0–0.2)
BASOPHILS NFR BLD: 0.4 % (ref 0–1.9)
BILIRUB SERPL-MCNC: 0.4 MG/DL (ref 0.1–1)
BUN SERPL-MCNC: 12 MG/DL (ref 8–23)
CALCIUM SERPL-MCNC: 9.1 MG/DL (ref 8.7–10.5)
CHLORIDE SERPL-SCNC: 109 MMOL/L (ref 95–110)
CHOLEST SERPL-MCNC: 136 MG/DL (ref 120–199)
CHOLEST/HDLC SERPL: 3.9 {RATIO} (ref 2–5)
CO2 SERPL-SCNC: 24 MMOL/L (ref 23–29)
CREAT SERPL-MCNC: 0.7 MG/DL (ref 0.5–1.4)
DIFFERENTIAL METHOD: ABNORMAL
EOSINOPHIL # BLD AUTO: 0.1 K/UL (ref 0–0.5)
EOSINOPHIL NFR BLD: 3 % (ref 0–8)
ERYTHROCYTE [DISTWIDTH] IN BLOOD BY AUTOMATED COUNT: 13.8 % (ref 11.5–14.5)
EST. GFR  (AFRICAN AMERICAN): >60 ML/MIN/1.73 M^2
EST. GFR  (NON AFRICAN AMERICAN): >60 ML/MIN/1.73 M^2
ESTIMATED AVG GLUCOSE: 105 MG/DL (ref 68–131)
FOLATE SERPL-MCNC: 9.1 NG/ML (ref 4–24)
GLUCOSE SERPL-MCNC: 103 MG/DL (ref 70–110)
HBA1C MFR BLD HPLC: 5.3 % (ref 4–5.6)
HCT VFR BLD AUTO: 36.3 % (ref 37–48.5)
HDLC SERPL-MCNC: 35 MG/DL (ref 40–75)
HDLC SERPL: 25.7 % (ref 20–50)
HGB BLD-MCNC: 11.7 G/DL (ref 12–16)
LDLC SERPL CALC-MCNC: 87.4 MG/DL (ref 63–159)
LYMPHOCYTES # BLD AUTO: 2.1 K/UL (ref 1–4.8)
LYMPHOCYTES NFR BLD: 45.2 % (ref 18–48)
MCH RBC QN AUTO: 31.1 PG (ref 27–31)
MCHC RBC AUTO-ENTMCNC: 32.2 G/DL (ref 32–36)
MCV RBC AUTO: 97 FL (ref 82–98)
MONOCYTES # BLD AUTO: 0.5 K/UL (ref 0.3–1)
MONOCYTES NFR BLD: 11.3 % (ref 4–15)
NEUTROPHILS # BLD AUTO: 1.9 K/UL (ref 1.8–7.7)
NEUTROPHILS NFR BLD: 40.1 % (ref 38–73)
NONHDLC SERPL-MCNC: 101 MG/DL
PLATELET # BLD AUTO: 204 K/UL (ref 150–350)
PMV BLD AUTO: 10 FL (ref 9.2–12.9)
POTASSIUM SERPL-SCNC: 4.1 MMOL/L (ref 3.5–5.1)
PROT SERPL-MCNC: 7 G/DL (ref 6–8.4)
RBC # BLD AUTO: 3.76 M/UL (ref 4–5.4)
SODIUM SERPL-SCNC: 142 MMOL/L (ref 136–145)
TRIGL SERPL-MCNC: 68 MG/DL (ref 30–150)
TSH SERPL DL<=0.005 MIU/L-ACNC: 1.8 UIU/ML (ref 0.4–4)
VIT B12 SERPL-MCNC: 489 PG/ML (ref 210–950)
WBC # BLD AUTO: 4.67 K/UL (ref 3.9–12.7)

## 2019-06-17 PROCEDURE — 99999 PR PBB SHADOW E&M-EST. PATIENT-LVL III: ICD-10-PCS | Mod: PBBFAC,,, | Performed by: INTERNAL MEDICINE

## 2019-06-17 PROCEDURE — 82306 VITAMIN D 25 HYDROXY: CPT

## 2019-06-17 PROCEDURE — 3078F PR MOST RECENT DIASTOLIC BLOOD PRESSURE < 80 MM HG: ICD-10-PCS | Mod: CPTII,S$GLB,, | Performed by: INTERNAL MEDICINE

## 2019-06-17 PROCEDURE — 84443 ASSAY THYROID STIM HORMONE: CPT

## 2019-06-17 PROCEDURE — 99396 PREV VISIT EST AGE 40-64: CPT | Mod: S$GLB,,, | Performed by: INTERNAL MEDICINE

## 2019-06-17 PROCEDURE — 36415 COLL VENOUS BLD VENIPUNCTURE: CPT

## 2019-06-17 PROCEDURE — 80053 COMPREHEN METABOLIC PANEL: CPT

## 2019-06-17 PROCEDURE — 82607 VITAMIN B-12: CPT

## 2019-06-17 PROCEDURE — 83036 HEMOGLOBIN GLYCOSYLATED A1C: CPT

## 2019-06-17 PROCEDURE — 99396 PR PREVENTIVE VISIT,EST,40-64: ICD-10-PCS | Mod: S$GLB,,, | Performed by: INTERNAL MEDICINE

## 2019-06-17 PROCEDURE — 82746 ASSAY OF FOLIC ACID SERUM: CPT

## 2019-06-17 PROCEDURE — 80061 LIPID PANEL: CPT

## 2019-06-17 PROCEDURE — 85025 COMPLETE CBC W/AUTO DIFF WBC: CPT

## 2019-06-17 PROCEDURE — 3078F DIAST BP <80 MM HG: CPT | Mod: CPTII,S$GLB,, | Performed by: INTERNAL MEDICINE

## 2019-06-17 PROCEDURE — 3074F PR MOST RECENT SYSTOLIC BLOOD PRESSURE < 130 MM HG: ICD-10-PCS | Mod: CPTII,S$GLB,, | Performed by: INTERNAL MEDICINE

## 2019-06-17 PROCEDURE — 3074F SYST BP LT 130 MM HG: CPT | Mod: CPTII,S$GLB,, | Performed by: INTERNAL MEDICINE

## 2019-06-17 PROCEDURE — 99999 PR PBB SHADOW E&M-EST. PATIENT-LVL III: CPT | Mod: PBBFAC,,, | Performed by: INTERNAL MEDICINE

## 2019-06-17 RX ORDER — LOSARTAN POTASSIUM 50 MG/1
TABLET ORAL
COMMUNITY
Start: 2019-04-20 | End: 2020-10-01

## 2019-07-03 ENCOUNTER — TELEPHONE (OUTPATIENT)
Dept: INTERNAL MEDICINE | Facility: CLINIC | Age: 64
End: 2019-07-03

## 2019-07-03 NOTE — TELEPHONE ENCOUNTER
----- Message from Lalitha Pérez sent at 7/3/2019  1:49 PM CDT -----  Contact: [phone]  Type: Test Results    What test was performed? Labs     When and where were the test performed? 6/17    Comments:please advise, thanks

## 2019-07-03 NOTE — TELEPHONE ENCOUNTER
----- Message from Lalitha Pérez sent at 7/3/2019  1:49 PM CDT -----  Contact: [phone]  Type: Test Results    What test was performed? Labs     When and where were the test performed? 6/17    Comments:please advise, thanks

## 2019-07-04 NOTE — TELEPHONE ENCOUNTER
"Results released through portal 6/18/19. Please confirm she uses portal and if not, then needs to be inactivated. Let her know the below:    "Ms. Livingston,     Your vitamin B12 level is normal so you do not need to restart your B12.     Your folate and vitamin D levels are sufficient.     You do not have diabetes. Your thyroid, liver and kidney functions are normal.     Your cholesterol is controlled. Continue atorvastatin daily.     You still have very mild anemia but this is stable.   "  "

## 2019-07-08 ENCOUNTER — TELEPHONE (OUTPATIENT)
Dept: INTERNAL MEDICINE | Facility: CLINIC | Age: 64
End: 2019-07-08

## 2019-07-08 NOTE — TELEPHONE ENCOUNTER
----- Message from Kenna Sandhu sent at 7/8/2019 11:25 AM CDT -----  Contact: [phone]  Type: Returning a call    Who left a message?Crystal    When did the practice call? 07/05    Comments: please call and advise , Thanks

## 2019-07-10 ENCOUNTER — TELEPHONE (OUTPATIENT)
Dept: INTERNAL MEDICINE | Facility: CLINIC | Age: 64
End: 2019-07-10

## 2019-07-10 NOTE — TELEPHONE ENCOUNTER
----- Message from Ariana Marinelli sent at 7/10/2019  4:06 PM CDT -----  Contact: self/[phone]  Patient is returning a phone call.  Who left a message for the patient: Andrea cloud [employer]  Does patient know what this is regarding:  Test results  Comments:

## 2019-07-10 NOTE — TELEPHONE ENCOUNTER
----- Message from Jessica Crum sent at 7/10/2019  4:42 PM CDT -----  Contact: Patient [phone]  Type: Returning a call    Who left a message?Claire Espinal MA    When did the practice call?Today-07/10/19    Comments:Please call back.      Thanks

## 2019-08-30 LAB — HM COLONOSCOPY: NORMAL

## 2019-09-04 ENCOUNTER — PATIENT OUTREACH (OUTPATIENT)
Dept: ADMINISTRATIVE | Facility: HOSPITAL | Age: 64
End: 2019-09-04

## 2019-09-09 ENCOUNTER — TELEPHONE (OUTPATIENT)
Dept: ADMINISTRATIVE | Facility: HOSPITAL | Age: 64
End: 2019-09-09

## 2019-09-09 ENCOUNTER — PATIENT OUTREACH (OUTPATIENT)
Dept: ADMINISTRATIVE | Facility: HOSPITAL | Age: 64
End: 2019-09-09

## 2019-09-17 ENCOUNTER — TELEPHONE (OUTPATIENT)
Dept: PHYSICAL MEDICINE AND REHAB | Facility: CLINIC | Age: 64
End: 2019-09-17

## 2019-12-17 ENCOUNTER — TELEPHONE (OUTPATIENT)
Dept: INTERNAL MEDICINE | Facility: CLINIC | Age: 64
End: 2019-12-17

## 2019-12-17 DIAGNOSIS — Z12.31 ENCOUNTER FOR SCREENING MAMMOGRAM FOR BREAST CANCER: Primary | ICD-10-CM

## 2019-12-17 NOTE — TELEPHONE ENCOUNTER
----- Message from Dorothea Varner sent at 12/17/2019 12:08 PM CST -----  Contact: [phone]  Pt called in regards to getting mammogram orders put into the system. She would like to get in done at [place]. She would like to go this week.       Please advise

## 2019-12-20 ENCOUNTER — HOSPITAL ENCOUNTER (OUTPATIENT)
Dept: RADIOLOGY | Facility: HOSPITAL | Age: 64
Discharge: HOME | End: 2019-12-20
Attending: INTERNAL MEDICINE
Payer: COMMERCIAL

## 2019-12-20 VITALS — WEIGHT: 171.5 LBS | HEIGHT: 63 IN | BODY MASS INDEX: 30.39 KG/M2

## 2019-12-20 DIAGNOSIS — Z12.31 ENCOUNTER FOR SCREENING MAMMOGRAM FOR BREAST CANCER: ICD-10-CM

## 2019-12-20 PROCEDURE — 77063 BREAST TOMOSYNTHESIS BI: CPT | Mod: TC

## 2019-12-20 PROCEDURE — 77067 SCR MAMMO BI INCL CAD: CPT | Mod: 26,,, | Performed by: RADIOLOGY

## 2019-12-20 PROCEDURE — 77067 MAMMO DIGITAL SCREENING BILAT WITH TOMOSYNTHESIS_CAD: ICD-10-PCS | Mod: 26,,, | Performed by: RADIOLOGY

## 2019-12-20 PROCEDURE — 77063 MAMMO DIGITAL SCREENING BILAT WITH TOMOSYNTHESIS_CAD: ICD-10-PCS | Mod: 26,,, | Performed by: RADIOLOGY

## 2019-12-20 PROCEDURE — 77063 BREAST TOMOSYNTHESIS BI: CPT | Mod: 26,,, | Performed by: RADIOLOGY

## 2019-12-27 ENCOUNTER — PATIENT OUTREACH (OUTPATIENT)
Dept: ADMINISTRATIVE | Facility: HOSPITAL | Age: 64
End: 2019-12-27

## 2020-07-02 ENCOUNTER — OFFICE VISIT (OUTPATIENT)
Dept: UROLOGY | Facility: CLINIC | Age: 65
End: 2020-07-02
Payer: COMMERCIAL

## 2020-07-02 VITALS
HEART RATE: 75 BPM | SYSTOLIC BLOOD PRESSURE: 98 MMHG | BODY MASS INDEX: 28.59 KG/M2 | WEIGHT: 161.38 LBS | HEIGHT: 63 IN | DIASTOLIC BLOOD PRESSURE: 60 MMHG

## 2020-07-02 DIAGNOSIS — R35.0 URINARY FREQUENCY: ICD-10-CM

## 2020-07-02 DIAGNOSIS — I10 BENIGN ESSENTIAL HYPERTENSION: Primary | Chronic | ICD-10-CM

## 2020-07-02 DIAGNOSIS — R31.29 MICROHEMATURIA: ICD-10-CM

## 2020-07-02 DIAGNOSIS — Z87.891 FORMER SMOKER: ICD-10-CM

## 2020-07-02 PROCEDURE — 3078F PR MOST RECENT DIASTOLIC BLOOD PRESSURE < 80 MM HG: ICD-10-PCS | Mod: CPTII,S$GLB,, | Performed by: UROLOGY

## 2020-07-02 PROCEDURE — 3074F SYST BP LT 130 MM HG: CPT | Mod: CPTII,S$GLB,, | Performed by: UROLOGY

## 2020-07-02 PROCEDURE — 99213 PR OFFICE/OUTPT VISIT, EST, LEVL III, 20-29 MIN: ICD-10-PCS | Mod: S$GLB,,, | Performed by: UROLOGY

## 2020-07-02 PROCEDURE — 3078F DIAST BP <80 MM HG: CPT | Mod: CPTII,S$GLB,, | Performed by: UROLOGY

## 2020-07-02 PROCEDURE — 99999 PR PBB SHADOW E&M-EST. PATIENT-LVL III: ICD-10-PCS | Mod: PBBFAC,,, | Performed by: UROLOGY

## 2020-07-02 PROCEDURE — 99213 OFFICE O/P EST LOW 20 MIN: CPT | Mod: S$GLB,,, | Performed by: UROLOGY

## 2020-07-02 PROCEDURE — 3008F PR BODY MASS INDEX (BMI) DOCUMENTED: ICD-10-PCS | Mod: CPTII,S$GLB,, | Performed by: UROLOGY

## 2020-07-02 PROCEDURE — 3008F BODY MASS INDEX DOCD: CPT | Mod: CPTII,S$GLB,, | Performed by: UROLOGY

## 2020-07-02 PROCEDURE — 3074F PR MOST RECENT SYSTOLIC BLOOD PRESSURE < 130 MM HG: ICD-10-PCS | Mod: CPTII,S$GLB,, | Performed by: UROLOGY

## 2020-07-02 PROCEDURE — 99999 PR PBB SHADOW E&M-EST. PATIENT-LVL III: CPT | Mod: PBBFAC,,, | Performed by: UROLOGY

## 2020-07-02 RX ORDER — OXYBUTYNIN CHLORIDE 5 MG/1
TABLET, EXTENDED RELEASE ORAL
Qty: 30 TABLET | Refills: 12 | Status: SHIPPED | OUTPATIENT
Start: 2020-07-02 | End: 2020-10-01 | Stop reason: SDUPTHER

## 2020-07-02 NOTE — PROGRESS NOTES
Subjective:       Patient ID: Nga Livingston is a 64 y.o. female.    Chief Complaint: Urinary Frequency (med refill, oxybutynin xr 5mg-doing well on med)    HPI  Nga Livingston is a 64 y.o. Female here for follow up of urinary frequency.  Had microhematuria with negative workup 7/15.  She had urinary frequency, urgency, urge incontinence, resolved with oxybutynin XR 5mg.   She is on oxybutynin XR 5mg once a week. She has some intermittent constipation.   No urgency, dysuria.   Nocturia not present if limits fluids. She does wear a panty liner. 1-2 at night a couple of times a week. (often associated with iced tea or drinking fluids at night. )  No gross hematuria.   No dysuria. No gross hematuria.   3 RBC per HPF 4/15.  ua negative.   Former smoker.    Past Surgical History:   Procedure Laterality Date    ESOPHAGOGASTRODUODENOSCOPY N/A 2018    Procedure: ESOPHAGOGASTRODUODENOSCOPY (EGD);  Surgeon: Jordan Kraft MD;  Location: [address];  Service: Endoscopy;  Laterality: N/A;    TUBAL LIGATION         Past Medical History:   Diagnosis Date    Depression     High blood cholesterol     Hypertension     Osteoporosis 3/26/15    outside records from [facility] - T score of L2 is -2.5       Social History     Socioeconomic History    Marital status: Single     Spouse name: Not on file    Number of children: Not on file    Years of education: Not on file    Highest education level: Not on file   Occupational History    Occupation: retired teacher   Social Needs    Financial resource strain: Not on file    Food insecurity     Worry: Not on file     Inability: Not on file    Transportation needs     Medical: Not on file     Non-medical: Not on file   Tobacco Use    Smoking status: Former Smoker     Packs/day: 0.30     Years: 10.00     Pack years: 3.00     Quit date: 2000     Years since quittin.2    Smokeless tobacco: Never Used   Substance and Sexual Activity    Alcohol use: Yes      Alcohol/week: 2.0 standard drinks     Types: 2 Glasses of wine per week    Drug use: No    Sexual activity: Never     Partners: Male     Comment: Has not been sexually active x 3 years   Lifestyle    Physical activity     Days per week: Not on file     Minutes per session: Not on file    Stress: Not on file   Relationships    Social connections     Talks on phone: Not on file     Gets together: Not on file     Attends Mandaen service: Not on file     Active member of club or organization: Not on file     Attends meetings of clubs or organizations: Not on file     Relationship status: Not on file   Other Topics Concern    Not on file   Social History Narrative    Not on file       Family History   Problem Relation Age of Onset    Hypertension Mother     Stroke Mother     Hypothyroidism Mother     Diabetes Sister     Hypothyroidism Sister     Breast cancer Sister     Cancer Sister         bone cancer    Hypothyroidism Maternal Aunt     Diabetes Maternal Uncle     Hypothyroidism Maternal Uncle     Breast cancer Paternal Aunt     Breast cancer Maternal Cousin     Breast cancer Maternal Cousin     Colon cancer Neg Hx        Current Outpatient Medications   Medication Sig Dispense Refill    aspirin 81 MG Chew Take 81 mg by mouth once daily.      atorvastatin (LIPITOR) 40 MG tablet Take 40 mg by mouth once daily.  0    carvedilol (COREG) 25 MG tablet Take 1 tablet (25 mg total) by mouth 2 (two) times daily with meals. 60 tablet 11    cetirizine (ZYRTEC) 10 MG tablet Take 10 mg by mouth once daily.      diphenhydrAMINE (BENADRYL) 25 mg capsule Take 25 mg by mouth nightly.      fish oil-omega-3 fatty acids 300-1,000 mg capsule Take 2 g by mouth once daily.      losartan (COZAAR) 50 MG tablet       oxybutynin (DITROPAN-XL) 5 MG TR24 TAKE 1 TABLET(5 MG) BY MOUTH EVERY DAY 30 tablet 12    triamcinolone (NASACORT) 55 mcg nasal inhaler 2 sprays by Nasal route once daily. 16.5 g 1    vitamin D 1000  units Tab Take 185 mg by mouth every other day.       vitamin E 100 UNIT capsule Take 100 Units by mouth once daily.      RESTASIS 0.05 % ophthalmic emulsion Place 1 drop into both eyes as needed.        No current facility-administered medications for this visit.        Review of patient's allergies indicates:   Allergen Reactions    Iodinated contrast media Diarrhea and Rash        BMP  Lab Results   Component Value Date     06/17/2019    K 4.1 06/17/2019     06/17/2019    CO2 24 06/17/2019    BUN 12 06/17/2019    CREATININE 0.7 06/17/2019    CALCIUM 9.1 06/17/2019    ANIONGAP 9 06/17/2019    ESTGFRAFRICA >60 06/17/2019    EGFRNONAA >60 06/17/2019       Review of Systems   Constitutional: Negative for chills, fever and unexpected weight change.   HENT: Negative for nosebleeds.    Eyes: Negative for visual disturbance.   Respiratory: Negative for chest tightness.    Cardiovascular: Negative for chest pain.   Gastrointestinal: Positive for constipation. Negative for diarrhea.   Genitourinary: Positive for nocturia. Negative for dysuria, frequency and urgency.   Musculoskeletal: Negative for myalgias.   Skin: Negative for rash.   Neurological: Negative for seizures.   Hematological: Does not bruise/bleed easily.   Psychiatric/Behavioral: Negative for behavioral problems.     Objective:      Physical Exam   Vitals reviewed.  Constitutional: She is oriented to person, place, and time. She appears well-developed.   HENT:   Head: Normocephalic and atraumatic.   Eyes: No scleral icterus.   Cardiovascular: Normal rate and regular rhythm.    Pulmonary/Chest: Effort normal. No respiratory distress.   Abdominal: She exhibits no mass.   Musculoskeletal: No tenderness.   Lymphadenopathy:     She has no cervical adenopathy.   Neurological: She is alert and oriented to person, place, and time.   Skin: Skin is warm and dry. No rash noted.       urine dip clear  Assessment:       1. Benign essential hypertension     2. Microhematuria    3. Urinary frequency    4. Former smoker        Plan:   Nga was seen today for urinary frequency.    Diagnoses and all orders for this visit:    Benign essential hypertension    Microhematuria    Urinary frequency  -     oxybutynin (DITROPAN-XL) 5 MG TR24; TAKE 1 TABLET(5 MG) BY MOUTH EVERY DAY    Former smoker    f/u 1 year. Consider trial of myrbetriq once on medicare.

## 2020-09-09 ENCOUNTER — PATIENT OUTREACH (OUTPATIENT)
Dept: ADMINISTRATIVE | Facility: HOSPITAL | Age: 65
End: 2020-09-09

## 2020-09-09 ENCOUNTER — TELEPHONE (OUTPATIENT)
Dept: INTERNAL MEDICINE | Facility: CLINIC | Age: 65
End: 2020-09-09

## 2020-09-09 NOTE — TELEPHONE ENCOUNTER
----- Message from Shona Schaeffer sent at 9/9/2020  3:17 PM CDT -----  Contact: [phone]  Patient is returning a phone call.  Who left a message for the patient: Miya  Does patient know what this is regarding:  ?  Comments:

## 2020-09-10 ENCOUNTER — PATIENT OUTREACH (OUTPATIENT)
Dept: ADMINISTRATIVE | Facility: HOSPITAL | Age: 65
End: 2020-09-10

## 2020-09-10 DIAGNOSIS — Z12.31 ENCOUNTER FOR SCREENING MAMMOGRAM FOR MALIGNANT NEOPLASM OF BREAST: Primary | ICD-10-CM

## 2020-09-30 ENCOUNTER — TELEPHONE (OUTPATIENT)
Dept: INTERNAL MEDICINE | Facility: CLINIC | Age: 65
End: 2020-09-30

## 2020-09-30 DIAGNOSIS — Z00.00 ANNUAL PHYSICAL EXAM: Primary | ICD-10-CM

## 2020-09-30 NOTE — TELEPHONE ENCOUNTER
----- Message from Macie Carrasco sent at 9/29/2020  3:48 PM CDT -----  Contact: Pt [phone]  Doctor appointment and lab have been scheduled.  Please link lab orders to the lab appointment.  Date of doctor appointment:  10/01  Date of lab appointment:  10/01  Physical or EP: annual  Comments:

## 2020-10-01 ENCOUNTER — LAB VISIT (OUTPATIENT)
Dept: LAB | Facility: HOSPITAL | Age: 65
End: 2020-10-01
Attending: INTERNAL MEDICINE
Payer: COMMERCIAL

## 2020-10-01 ENCOUNTER — OFFICE VISIT (OUTPATIENT)
Dept: INTERNAL MEDICINE | Facility: CLINIC | Age: 65
End: 2020-10-01
Payer: COMMERCIAL

## 2020-10-01 ENCOUNTER — IMMUNIZATION (OUTPATIENT)
Dept: INTERNAL MEDICINE | Facility: CLINIC | Age: 65
End: 2020-10-01
Payer: COMMERCIAL

## 2020-10-01 VITALS
HEIGHT: 63 IN | WEIGHT: 165.56 LBS | OXYGEN SATURATION: 98 % | SYSTOLIC BLOOD PRESSURE: 116 MMHG | BODY MASS INDEX: 29.34 KG/M2 | DIASTOLIC BLOOD PRESSURE: 72 MMHG | HEART RATE: 65 BPM

## 2020-10-01 DIAGNOSIS — Z78.0 POSTMENOPAUSAL ESTROGEN DEFICIENCY: ICD-10-CM

## 2020-10-01 DIAGNOSIS — E78.5 HYPERLIPIDEMIA, UNSPECIFIED HYPERLIPIDEMIA TYPE: ICD-10-CM

## 2020-10-01 DIAGNOSIS — I70.0 AORTIC ATHEROSCLEROSIS: ICD-10-CM

## 2020-10-01 DIAGNOSIS — I10 BENIGN ESSENTIAL HYPERTENSION: ICD-10-CM

## 2020-10-01 DIAGNOSIS — R35.0 URINARY FREQUENCY: ICD-10-CM

## 2020-10-01 DIAGNOSIS — J31.0 CHRONIC RHINITIS: ICD-10-CM

## 2020-10-01 DIAGNOSIS — Z00.00 ANNUAL PHYSICAL EXAM: Primary | ICD-10-CM

## 2020-10-01 DIAGNOSIS — I65.23 BILATERAL CAROTID ARTERY STENOSIS: ICD-10-CM

## 2020-10-01 DIAGNOSIS — Z00.00 ANNUAL PHYSICAL EXAM: ICD-10-CM

## 2020-10-01 LAB
ALBUMIN SERPL BCP-MCNC: 4.4 G/DL (ref 3.5–5.2)
ALP SERPL-CCNC: 83 U/L (ref 55–135)
ALT SERPL W/O P-5'-P-CCNC: 18 U/L (ref 10–44)
ANION GAP SERPL CALC-SCNC: 11 MMOL/L (ref 8–16)
AST SERPL-CCNC: 23 U/L (ref 10–40)
BASOPHILS # BLD AUTO: 0.04 K/UL (ref 0–0.2)
BASOPHILS NFR BLD: 0.7 % (ref 0–1.9)
BILIRUB SERPL-MCNC: 0.4 MG/DL (ref 0.1–1)
BUN SERPL-MCNC: 10 MG/DL (ref 8–23)
CALCIUM SERPL-MCNC: 9.3 MG/DL (ref 8.7–10.5)
CHLORIDE SERPL-SCNC: 101 MMOL/L (ref 95–110)
CHOLEST SERPL-MCNC: 166 MG/DL (ref 120–199)
CHOLEST/HDLC SERPL: 3.8 {RATIO} (ref 2–5)
CO2 SERPL-SCNC: 26 MMOL/L (ref 23–29)
CREAT SERPL-MCNC: 1 MG/DL (ref 0.5–1.4)
DIFFERENTIAL METHOD: ABNORMAL
EOSINOPHIL # BLD AUTO: 0.1 K/UL (ref 0–0.5)
EOSINOPHIL NFR BLD: 1.5 % (ref 0–8)
ERYTHROCYTE [DISTWIDTH] IN BLOOD BY AUTOMATED COUNT: 13.5 % (ref 11.5–14.5)
EST. GFR  (AFRICAN AMERICAN): >60 ML/MIN/1.73 M^2
EST. GFR  (NON AFRICAN AMERICAN): 59.7 ML/MIN/1.73 M^2
ESTIMATED AVG GLUCOSE: 105 MG/DL (ref 68–131)
GLUCOSE SERPL-MCNC: 104 MG/DL (ref 70–110)
HBA1C MFR BLD HPLC: 5.3 % (ref 4–5.6)
HCT VFR BLD AUTO: 37.7 % (ref 37–48.5)
HDLC SERPL-MCNC: 44 MG/DL (ref 40–75)
HDLC SERPL: 26.5 % (ref 20–50)
HGB BLD-MCNC: 11.8 G/DL (ref 12–16)
IMM GRANULOCYTES # BLD AUTO: 0.01 K/UL (ref 0–0.04)
IMM GRANULOCYTES NFR BLD AUTO: 0.2 % (ref 0–0.5)
LDLC SERPL CALC-MCNC: 101 MG/DL (ref 63–159)
LYMPHOCYTES # BLD AUTO: 1.5 K/UL (ref 1–4.8)
LYMPHOCYTES NFR BLD: 28.7 % (ref 18–48)
MCH RBC QN AUTO: 31.4 PG (ref 27–31)
MCHC RBC AUTO-ENTMCNC: 31.3 G/DL (ref 32–36)
MCV RBC AUTO: 100 FL (ref 82–98)
MONOCYTES # BLD AUTO: 0.7 K/UL (ref 0.3–1)
MONOCYTES NFR BLD: 12.4 % (ref 4–15)
NEUTROPHILS # BLD AUTO: 3 K/UL (ref 1.8–7.7)
NEUTROPHILS NFR BLD: 56.5 % (ref 38–73)
NONHDLC SERPL-MCNC: 122 MG/DL
NRBC BLD-RTO: 0 /100 WBC
PLATELET # BLD AUTO: 242 K/UL (ref 150–350)
PMV BLD AUTO: 10.2 FL (ref 9.2–12.9)
POTASSIUM SERPL-SCNC: 3.8 MMOL/L (ref 3.5–5.1)
PROT SERPL-MCNC: 7.9 G/DL (ref 6–8.4)
RBC # BLD AUTO: 3.76 M/UL (ref 4–5.4)
SODIUM SERPL-SCNC: 138 MMOL/L (ref 136–145)
TRIGL SERPL-MCNC: 105 MG/DL (ref 30–150)
TSH SERPL DL<=0.005 MIU/L-ACNC: 1.48 UIU/ML (ref 0.4–4)
WBC # BLD AUTO: 5.34 K/UL (ref 3.9–12.7)

## 2020-10-01 PROCEDURE — 3078F DIAST BP <80 MM HG: CPT | Mod: CPTII,S$GLB,, | Performed by: INTERNAL MEDICINE

## 2020-10-01 PROCEDURE — 90686 FLU VACCINE (QUAD) GREATER THAN OR EQUAL TO 3YO PRESERVATIVE FREE IM: ICD-10-PCS | Mod: S$GLB,,, | Performed by: INTERNAL MEDICINE

## 2020-10-01 PROCEDURE — 90471 FLU VACCINE (QUAD) GREATER THAN OR EQUAL TO 3YO PRESERVATIVE FREE IM: ICD-10-PCS | Mod: S$GLB,,, | Performed by: INTERNAL MEDICINE

## 2020-10-01 PROCEDURE — 83036 HEMOGLOBIN GLYCOSYLATED A1C: CPT

## 2020-10-01 PROCEDURE — 99999 PR PBB SHADOW E&M-EST. PATIENT-LVL IV: ICD-10-PCS | Mod: PBBFAC,,, | Performed by: INTERNAL MEDICINE

## 2020-10-01 PROCEDURE — 99999 PR PBB SHADOW E&M-EST. PATIENT-LVL IV: CPT | Mod: PBBFAC,,, | Performed by: INTERNAL MEDICINE

## 2020-10-01 PROCEDURE — 90686 IIV4 VACC NO PRSV 0.5 ML IM: CPT | Mod: S$GLB,,, | Performed by: INTERNAL MEDICINE

## 2020-10-01 PROCEDURE — 84443 ASSAY THYROID STIM HORMONE: CPT

## 2020-10-01 PROCEDURE — 3078F PR MOST RECENT DIASTOLIC BLOOD PRESSURE < 80 MM HG: ICD-10-PCS | Mod: CPTII,S$GLB,, | Performed by: INTERNAL MEDICINE

## 2020-10-01 PROCEDURE — 3008F BODY MASS INDEX DOCD: CPT | Mod: CPTII,S$GLB,, | Performed by: INTERNAL MEDICINE

## 2020-10-01 PROCEDURE — 3074F PR MOST RECENT SYSTOLIC BLOOD PRESSURE < 130 MM HG: ICD-10-PCS | Mod: CPTII,S$GLB,, | Performed by: INTERNAL MEDICINE

## 2020-10-01 PROCEDURE — 80061 LIPID PANEL: CPT

## 2020-10-01 PROCEDURE — 3074F SYST BP LT 130 MM HG: CPT | Mod: CPTII,S$GLB,, | Performed by: INTERNAL MEDICINE

## 2020-10-01 PROCEDURE — 99999 PR PBB SHADOW E&M-EST. PATIENT-LVL I: CPT | Mod: PBBFAC,,,

## 2020-10-01 PROCEDURE — 80053 COMPREHEN METABOLIC PANEL: CPT

## 2020-10-01 PROCEDURE — 90471 IMMUNIZATION ADMIN: CPT | Mod: S$GLB,,, | Performed by: INTERNAL MEDICINE

## 2020-10-01 PROCEDURE — 99999 PR PBB SHADOW E&M-EST. PATIENT-LVL I: ICD-10-PCS | Mod: PBBFAC,,,

## 2020-10-01 PROCEDURE — 85025 COMPLETE CBC W/AUTO DIFF WBC: CPT

## 2020-10-01 PROCEDURE — 99396 PR PREVENTIVE VISIT,EST,40-64: ICD-10-PCS | Mod: 25,S$GLB,, | Performed by: INTERNAL MEDICINE

## 2020-10-01 PROCEDURE — 99396 PREV VISIT EST AGE 40-64: CPT | Mod: 25,S$GLB,, | Performed by: INTERNAL MEDICINE

## 2020-10-01 PROCEDURE — 3008F PR BODY MASS INDEX (BMI) DOCUMENTED: ICD-10-PCS | Mod: CPTII,S$GLB,, | Performed by: INTERNAL MEDICINE

## 2020-10-01 PROCEDURE — 36415 COLL VENOUS BLD VENIPUNCTURE: CPT

## 2020-10-01 RX ORDER — ATORVASTATIN CALCIUM 40 MG/1
40 TABLET, FILM COATED ORAL DAILY
Qty: 90 TABLET | Refills: 3 | Status: SHIPPED | OUTPATIENT
Start: 2020-10-01 | End: 2022-11-17

## 2020-10-01 RX ORDER — HYDROCHLOROTHIAZIDE 12.5 MG/1
TABLET ORAL
COMMUNITY
Start: 2020-09-04

## 2020-10-01 RX ORDER — OXYBUTYNIN CHLORIDE 5 MG/1
TABLET, EXTENDED RELEASE ORAL
Qty: 90 TABLET | Refills: 3 | Status: SHIPPED | OUTPATIENT
Start: 2020-10-01 | End: 2021-11-23

## 2020-10-01 RX ORDER — OLMESARTAN MEDOXOMIL 20 MG/1
TABLET ORAL
COMMUNITY
Start: 2020-08-18

## 2020-10-01 RX ORDER — CARVEDILOL 25 MG/1
25 TABLET ORAL 2 TIMES DAILY WITH MEALS
Qty: 180 TABLET | Refills: 3 | Status: SHIPPED | OUTPATIENT
Start: 2020-10-01 | End: 2020-11-23 | Stop reason: SDUPTHER

## 2020-10-01 NOTE — PROGRESS NOTES
INTERNAL MEDICINE ANNUAL VISIT NOTE      CHIEF COMPLAINT     ANNUAL    HPI     Nga Livingston is a 64 y.o. AA female who presents for annual.  MMG 12/20/19 - neg  DEXA 3/22/17 - osteopenia of spine and hip.   Cscope 8/30/19 (@[facility]) - diverticulosis. 1 polyp. Repeat in 5 yrs.  Pap neg 3/2017 w/ Dr. Villareal. Due. Has appt to see Dr. Villareal on 10/26.    HTN - coreg 25mg BID, olmesartan 20mg daily (changed from losartan 50)  About 3 months ago, started having leg swelling. Added hctz 12.5mg daily from outside cardiologist - Dr. Alfred.   Checks BP at home. Occasionally SBP would go up to 150 but occasionally would go to 90s/60s (however this is not often) - thinks assoc w/ some lightheadedness..     HLD - atorva 40mg daily.      US B carotids 2/7/18 - 0-19% R carotid artery stenosis and 20-39% L ICA stenosis.  Follows w/ outside cardiologist.      OAB - ditropan xl 5mg daily.  Still helping w/ overactive bladder.   Last saw Dr. Urias 7/2/20.     Allergies - nasacort, zyrtec 10mg daily. Helps w/ allergies.   Sinus congestion. Ear fullness.      EGD 8/14/18 - small hiatal hernia. LA Grade A reflux esophagitis. Esophageal stenosis s/p dilation.  No acid reflux/epigastric pain. Off nexium. No dysphagia.      CXR 2/2/18 - chest clear. DJD and aortic plaque. On asa 81 and atorva 40.     Past Medical History:  Past Medical History:   Diagnosis Date    Depression     High blood cholesterol     Hypertension     Osteoporosis 3/26/15    outside records from [facility] - T score of L2 is -2.5       Past Surgical History:  Past Surgical History:   Procedure Laterality Date    ESOPHAGOGASTRODUODENOSCOPY N/A 8/14/2018    Procedure: ESOPHAGOGASTRODUODENOSCOPY (EGD);  Surgeon: Jordan Kraft MD;  Location: [facility] ([address] [place] [address]);  Service: Endoscopy;  Laterality: N/A;    TUBAL LIGATION         Allergies:  Review of patient's allergies indicates:   Allergen Reactions    Iodinated contrast media Diarrhea and Rash       Home  Medications:  Prior to Admission medications    Medication Sig Start Date End Date Taking? Authorizing Provider   aspirin 81 MG Chew Take 81 mg by mouth once daily.    Historical Provider   atorvastatin (LIPITOR) 40 MG tablet Take 40 mg by mouth once daily. 17   Historical Provider   carvedilol (COREG) 25 MG tablet Take 1 tablet (25 mg total) by mouth 2 (two) times daily with meals. 19   Elaine Mahmood MD   cetirizine (ZYRTEC) 10 MG tablet Take 10 mg by mouth once daily.    Historical Provider   diphenhydrAMINE (BENADRYL) 25 mg capsule Take 25 mg by mouth nightly.    Historical Provider   fish oil-omega-3 fatty acids 300-1,000 mg capsule Take 2 g by mouth once daily.    Historical Provider   losartan (COZAAR) 50 MG tablet  19   Historical Provider   oxybutynin (DITROPAN-XL) 5 MG TR24 TAKE 1 TABLET(5 MG) BY MOUTH EVERY DAY 20   Monie Urias MD   RESTASIS 0.05 % ophthalmic emulsion Place 1 drop into both eyes as needed.  11/9/15   Historical Provider   triamcinolone (NASACORT) 55 mcg nasal inhaler 2 sprays by Nasal route once daily. 3/7/17   Elaine Mahmood MD   vitamin D 1000 units Tab Take 185 mg by mouth every other day.     Historical Provider   vitamin E 100 UNIT capsule Take 100 Units by mouth once daily.    Historical Provider       Family History:  Family History   Problem Relation Age of Onset    Hypertension Mother     Stroke Mother     Hypothyroidism Mother     Diabetes Sister     Hypothyroidism Sister     Breast cancer Sister     Cancer Sister         bone cancer    Hypothyroidism Maternal Aunt     Diabetes Maternal Uncle     Hypothyroidism Maternal Uncle     Breast cancer Paternal Aunt     Breast cancer Maternal Cousin     Breast cancer Maternal Cousin     Colon cancer Neg Hx        Social History:  Social History     Tobacco Use    Smoking status: Former Smoker     Packs/day: 0.30     Years: 10.00     Pack years: 3.00     Quit date: 2000     Years since quittin.4  "   Smokeless tobacco: Never Used   Substance Use Topics    Alcohol use: Yes     Alcohol/week: 2.0 standard drinks     Types: 2 Glasses of wine per week    Drug use: No       Review of Systems:  Review of Systems   Constitutional: Negative for chills and fever.   HENT: Positive for congestion, postnasal drip and rhinorrhea.    Eyes: Negative for visual disturbance.   Respiratory: Negative for cough, shortness of breath and wheezing.    Cardiovascular: Negative for chest pain, palpitations and leg swelling (better w/ HCTZ).   Gastrointestinal: Positive for abdominal pain (very occasionally. chronic L mid abdominal pain. declines CT scan. ongoing for years. no change from 4+ yrs ago). Negative for blood in stool, constipation, diarrhea, nausea and vomiting.   Endocrine: Negative for polyuria.   Genitourinary: Negative for dysuria and frequency.   Musculoskeletal: Negative for arthralgias and joint swelling.   Neurological: Negative for dizziness, light-headedness and headaches.   Psychiatric/Behavioral: Negative for dysphoric mood. The patient is not nervous/anxious.        Health Maintainence:    reviewed.     PHYSICAL EXAM     /72 (BP Location: Left arm, Patient Position: Sitting, BP Method: Medium (Manual))   Pulse 65   Ht 5' 3" (1.6 m)   Wt 75.1 kg (165 lb 9.1 oz)   LMP 03/22/2007   SpO2 98%   BMI 29.33 kg/m²     GEN - A+OX4, NAD   HEENT - PERRL, EOMI, OP clear. MMM. Tm NORMAL.  Neck - No thyromegaly or cervical LAD. No thyroid masses felt.  CV - RRR, no m/r   Chest - CTAB, no wheezing or rhonchi  Abd - S/NT/ND/+BS.   Ext - 2+BDP and radial pulses. No LE edema.   Neuro - PERRL, EOMI, no nystagmus, eyebrow raise, facial sensation, hearing, m of mastication, smile, palatal raise, shoulder shrug, tongue protrusion symmetric and intact. 5/5 BUE and BLE strength. Sensation to light touch intact throughout. 2+ DTRs. Normal gait.   MSK - No spinal tenderness to palpation. Normal gait.   Skin - No " rash.    LABS     Previous labs reviewed.    ASSESSMENT/PLAN     Nga Livingston is a 64 y.o. female with  Nga was seen today for annual exam.    Diagnoses and all orders for this visit:    Annual physical exam - flu vaccine today. Has appt w/ GYN upcomign.     Benign essential hypertension - Stable and controlled. Continue current medications.  -     carvediloL (COREG) 25 MG tablet; Take 1 tablet (25 mg total) by mouth 2 (two) times daily with meals.    Hyperlipidemia, unspecified hyperlipidemia type  -     atorvastatin (LIPITOR) 40 MG tablet; Take 1 tablet (40 mg total) by mouth once daily.    Bilateral carotid artery stenosis  -     atorvastatin (LIPITOR) 40 MG tablet; Take 1 tablet (40 mg total) by mouth once daily.    Aortic atherosclerosis  -     atorvastatin (LIPITOR) 40 MG tablet; Take 1 tablet (40 mg total) by mouth once daily.    Urinary frequency  -     oxybutynin (DITROPAN-XL) 5 MG TR24; TAKE 1 TABLET(5 MG) BY MOUTH EVERY DAY    Postmenopausal estrogen deficiency  -     DXA Bone Density Spine And Hip; Future; Expected date: 10/01/2020    Chronic rhinitis - cont nasacort and zyrtec.    Other orders  -     hydroCHLOROthiazide (HYDRODIURIL) 12.5 MG Tab  -     olmesartan (BENICAR) 20 MG tablet    Labs pending today.       RTC in 12 months, sooner if needed and depending on labs.    Elaine Mahmood MD  Department of Internal Medicine - [facility]  8:40 AM

## 2020-10-05 ENCOUNTER — PATIENT MESSAGE (OUTPATIENT)
Dept: ADMINISTRATIVE | Facility: HOSPITAL | Age: 65
End: 2020-10-05

## 2020-10-26 ENCOUNTER — OFFICE VISIT (OUTPATIENT)
Dept: OBSTETRICS AND GYNECOLOGY | Facility: CLINIC | Age: 65
End: 2020-10-26
Attending: OBSTETRICS & GYNECOLOGY
Payer: COMMERCIAL

## 2020-10-26 VITALS
DIASTOLIC BLOOD PRESSURE: 62 MMHG | HEIGHT: 63 IN | WEIGHT: 165.25 LBS | SYSTOLIC BLOOD PRESSURE: 118 MMHG | BODY MASS INDEX: 29.28 KG/M2

## 2020-10-26 DIAGNOSIS — Z12.4 PAP SMEAR FOR CERVICAL CANCER SCREENING: ICD-10-CM

## 2020-10-26 DIAGNOSIS — Z01.419 ENCOUNTER FOR GYNECOLOGICAL EXAMINATION WITHOUT ABNORMAL FINDING: Primary | ICD-10-CM

## 2020-10-26 DIAGNOSIS — Z12.31 SCREENING MAMMOGRAM, ENCOUNTER FOR: ICD-10-CM

## 2020-10-26 PROCEDURE — 3078F PR MOST RECENT DIASTOLIC BLOOD PRESSURE < 80 MM HG: ICD-10-PCS | Mod: CPTII,S$GLB,, | Performed by: OBSTETRICS & GYNECOLOGY

## 2020-10-26 PROCEDURE — 3078F DIAST BP <80 MM HG: CPT | Mod: CPTII,S$GLB,, | Performed by: OBSTETRICS & GYNECOLOGY

## 2020-10-26 PROCEDURE — 3074F PR MOST RECENT SYSTOLIC BLOOD PRESSURE < 130 MM HG: ICD-10-PCS | Mod: CPTII,S$GLB,, | Performed by: OBSTETRICS & GYNECOLOGY

## 2020-10-26 PROCEDURE — 3074F SYST BP LT 130 MM HG: CPT | Mod: CPTII,S$GLB,, | Performed by: OBSTETRICS & GYNECOLOGY

## 2020-10-26 PROCEDURE — 88175 CYTOPATH C/V AUTO FLUID REDO: CPT

## 2020-10-26 PROCEDURE — 87624 HPV HI-RISK TYP POOLED RSLT: CPT

## 2020-10-26 PROCEDURE — 99386 PREV VISIT NEW AGE 40-64: CPT | Mod: S$GLB,,, | Performed by: OBSTETRICS & GYNECOLOGY

## 2020-10-26 PROCEDURE — 99386 PR PREVENTIVE VISIT,NEW,40-64: ICD-10-PCS | Mod: S$GLB,,, | Performed by: OBSTETRICS & GYNECOLOGY

## 2020-10-26 PROCEDURE — 3008F PR BODY MASS INDEX (BMI) DOCUMENTED: ICD-10-PCS | Mod: CPTII,S$GLB,, | Performed by: OBSTETRICS & GYNECOLOGY

## 2020-10-26 PROCEDURE — 3008F BODY MASS INDEX DOCD: CPT | Mod: CPTII,S$GLB,, | Performed by: OBSTETRICS & GYNECOLOGY

## 2020-10-26 NOTE — PROGRESS NOTES
Subjective:       Patient ID: Nga Livingston is a 64 y.o. female.    Chief Complaint:  Annual Exam      History of Present Illness  HPI    Nga Livingston is a 64 y.o. female  here for her annual GYN exam.  She has not been seen in about 3 years.  She describes her periods as stopped about 13 years ago,   denies break through bleeding.   denies vaginal itching or irritation.  Denies vaginal discharge.  She is not sexually active.      History of abnormal pap: No  Last Pap: approximate date 2017 and was normal  Last MMG: normal-Dec 2019-routine follow-up in 12 months  Last Colonoscopy:  colonoscopy 1 year ago with abnormalities(Polyps , needs to repeat in 5 years).  denies domestic violence. She does feel safe at home.     Past Medical History:   Diagnosis Date    Depression     High blood cholesterol     Hypertension     Osteoporosis 3/26/15    outside records from [facility] T score of L2 is -2.5     Past Surgical History:   Procedure Laterality Date    ESOPHAGOGASTRODUODENOSCOPY N/A 2018    Procedure: ESOPHAGOGASTRODUODENOSCOPY (EGD);  Surgeon: Jordan Kraft MD;  Location: [address];  Service: Endoscopy;  Laterality: N/A;    TUBAL LIGATION       Social History     Socioeconomic History    Marital status: Single     Spouse name: Not on file    Number of children: Not on file    Years of education: Not on file    Highest education level: Not on file   Occupational History    Occupation: retired teacher   Social Needs    Financial resource strain: Not on file    Food insecurity     Worry: Not on file     Inability: Not on file    Transportation needs     Medical: Not on file     Non-medical: Not on file   Tobacco Use    Smoking status: Former Smoker     Packs/day: 0.30     Years: 10.00     Pack years: 3.00     Quit date: 2000     Years since quittin.5    Smokeless tobacco: Never Used   Substance and Sexual Activity    Alcohol use: Yes     Alcohol/week: 2.0  "standard drinks     Types: 2 Glasses of wine per week    Drug use: No    Sexual activity: Not Currently     Partners: Male     Comment: Has not been sexually active recently   Lifestyle    Physical activity     Days per week: Not on file     Minutes per session: Not on file    Stress: Not on file   Relationships    Social connections     Talks on phone: Not on file     Gets together: Not on file     Attends [employer] service: Not on file     Active member of club or organization: Not on file     Attends meetings of clubs or organizations: Not on file     Relationship status: Not on file   Other Topics Concern    Not on file   Social History Narrative    Not on file     Family History   Problem Relation Age of Onset    Hypertension Mother     Stroke Mother     Hypothyroidism Mother     Diabetes Sister     Hypothyroidism Sister     Breast cancer Sister     Cancer Sister         bone cancer    Hypothyroidism Maternal Aunt     Diabetes Maternal Uncle     Hypothyroidism Maternal Uncle     Breast cancer Paternal Aunt     Breast cancer Maternal Cousin     Breast cancer Maternal Cousin     Colon cancer Neg Hx      OB History        2    Para        Term   0            AB   2    Living           SAB        TAB   2    Ectopic        Multiple        Live Births                     /[birth year]   Ht 5' 3" (1.6 m)   Wt 75 kg (165 lb 3.8 oz)   LMP 2007   BMI 29.27 kg/m²         GYN & OB History  Patient's last menstrual period was 2007.   Date of Last Pap: 3/27/2017    OB History    Para Term  AB Living   2   0   2     SAB TAB Ectopic Multiple Live Births     2            # Outcome Date GA Lbr Renny/2nd Weight Sex Delivery Anes PTL Lv   2 TAB            1 TAB                Review of Systems  Review of Systems   Constitutional: Negative for activity change, appetite change, fatigue and unexpected weight change.   HENT: Negative.    Eyes: Negative for visual disturbance. "   Respiratory: Negative for shortness of breath and wheezing.    Cardiovascular: Negative for chest pain, palpitations and leg swelling.   Gastrointestinal: Negative for abdominal pain, bloating and blood in stool.   Endocrine: Positive for hot flashes. Negative for diabetes and hair loss.   Genitourinary: Positive for frequency and hot flashes. Negative for decreased libido, dyspareunia, dysuria, postmenopausal bleeding and vaginal dryness.   Musculoskeletal: Negative for back pain and joint swelling.   Integumentary:  Negative for acne, hair changes and nipple discharge.   Neurological: Negative for headaches.   Hematological: Does not bruise/bleed easily.   Psychiatric/Behavioral: Negative for depression and sleep disturbance. The patient is not nervous/anxious.    Breast: Negative for mastodynia and nipple discharge          Objective:      Physical Exam:   Constitutional: She is oriented to person, place, and time. She appears well-developed and well-nourished.    HENT:   Head: Normocephalic and atraumatic.    Eyes: Pupils are equal, round, and reactive to light. EOM are normal.    Neck: Normal range of motion. Neck supple.    Cardiovascular: Normal rate and regular rhythm.     Pulmonary/Chest: Effort normal and breath sounds normal.   BREASTS:  no mass, no tenderness, no deformity and no retraction. Right breast exhibits no inverted nipple, no mass, no nipple discharge, no skin change, no tenderness, no bleeding and no swelling. Left breast exhibits no inverted nipple, no mass, no nipple discharge, no skin change, no tenderness, no bleeding and no swelling. Breasts are symmetrical.              Abdominal: Soft. Bowel sounds are normal.     Genitourinary:    Pelvic exam was performed with patient supine.      Genitourinary Comments: PELVIC: Normal external genitalia without lesions.  Normal hair distribution.  Adequate perineal body, normal urethral meatus.  Vagina  Dry and poorly rugated, atrophic, without  lesions or discharge.  Cervix pink, without lesions, discharge or tenderness.  No significant cystocele or rectocele.  Bimanual exam shows uterus to be normal size, regular, mobile and nontender.  Adnexa without masses or tenderness.    RECTAL:Deferred               Musculoskeletal: Normal range of motion and moves all extremeties.       Neurological: She is alert and oriented to person, place, and time.    Skin: Skin is warm and dry.    Psychiatric: She has a normal mood and affect.              Assessment:        1. Encounter for gynecological examination without abnormal finding    2. Pap smear for cervical cancer screening    3. Screening mammogram, encounter for                Plan:        1. Encounter for gynecological examination without abnormal finding  COUNSELING:  The patient was counseled today on regular weight bearing exercise. Patient was counseled today on the new ACS guidelines for cervical cytology screening as well as the current recommendations for breast cancer screening. Counseling session lasted approximately 10 minutes, and all her questions were answered. She was advised to see her primary care physician for all other health maintenance.   FOLLOW-UP with me for next routine visit.         2. Pap smear for cervical cancer screening      - Liquid-Based Pap Smear, Screening  - HPV High Risk Genotypes, PCR    3. Screening mammogram, encounter for      - Mammo Digital Screening Bilat w/ [facility]; Future       Follow up in about 1 year (around 10/26/2021).

## 2020-11-09 LAB
HPV HR 12 DNA SPEC QL NAA+PROBE: NEGATIVE
HPV16 AG SPEC QL: NEGATIVE
HPV18 DNA SPEC QL NAA+PROBE: NEGATIVE

## 2020-11-23 DIAGNOSIS — I10 BENIGN ESSENTIAL HYPERTENSION: ICD-10-CM

## 2020-11-23 RX ORDER — CARVEDILOL 25 MG/1
25 TABLET ORAL 2 TIMES DAILY WITH MEALS
Qty: 180 TABLET | Refills: 3 | Status: SHIPPED | OUTPATIENT
Start: 2020-11-23 | End: 2021-11-23

## 2020-12-02 LAB
FINAL PATHOLOGIC DIAGNOSIS: NORMAL
Lab: NORMAL

## 2020-12-29 ENCOUNTER — HOSPITAL ENCOUNTER (OUTPATIENT)
Dept: RADIOLOGY | Facility: OTHER | Age: 65
Discharge: HOME | End: 2020-12-29
Attending: INTERNAL MEDICINE
Payer: MEDICARE

## 2020-12-29 ENCOUNTER — HOSPITAL ENCOUNTER (OUTPATIENT)
Dept: RADIOLOGY | Facility: OTHER | Age: 65
Discharge: HOME | End: 2020-12-29
Attending: OBSTETRICS & GYNECOLOGY
Payer: MEDICARE

## 2020-12-29 DIAGNOSIS — Z91.89 INCREASED RISK OF BREAST CANCER: Primary | ICD-10-CM

## 2020-12-29 DIAGNOSIS — Z12.31 SCREENING MAMMOGRAM, ENCOUNTER FOR: ICD-10-CM

## 2020-12-29 DIAGNOSIS — Z78.0 POSTMENOPAUSAL ESTROGEN DEFICIENCY: ICD-10-CM

## 2020-12-29 PROCEDURE — 77080 DEXA BONE DENSITY SPINE HIP: ICD-10-PCS | Mod: 26,,, | Performed by: RADIOLOGY

## 2020-12-29 PROCEDURE — 77063 MAMMO DIGITAL SCREENING BILAT WITH TOMO: ICD-10-PCS | Mod: 26,,, | Performed by: RADIOLOGY

## 2020-12-29 PROCEDURE — 77067 SCR MAMMO BI INCL CAD: CPT | Mod: 26,,, | Performed by: RADIOLOGY

## 2020-12-29 PROCEDURE — 77063 BREAST TOMOSYNTHESIS BI: CPT | Mod: 26,,, | Performed by: RADIOLOGY

## 2020-12-29 PROCEDURE — 77067 MAMMO DIGITAL SCREENING BILAT WITH TOMO: ICD-10-PCS | Mod: 26,,, | Performed by: RADIOLOGY

## 2020-12-29 PROCEDURE — 77067 SCR MAMMO BI INCL CAD: CPT | Mod: TC

## 2020-12-29 PROCEDURE — 77080 DXA BONE DENSITY AXIAL: CPT | Mod: 26,,, | Performed by: RADIOLOGY

## 2020-12-29 PROCEDURE — 77080 DXA BONE DENSITY AXIAL: CPT | Mod: TC

## 2021-04-05 ENCOUNTER — PATIENT MESSAGE (OUTPATIENT)
Dept: RESEARCH | Facility: HOSPITAL | Age: 66
End: 2021-04-05

## 2021-05-31 ENCOUNTER — OFFICE VISIT (OUTPATIENT)
Dept: UROLOGY | Facility: CLINIC | Age: 66
End: 2021-05-31
Payer: MEDICARE

## 2021-05-31 ENCOUNTER — LAB VISIT (OUTPATIENT)
Dept: LAB | Facility: HOSPITAL | Age: 66
End: 2021-05-31
Payer: MEDICARE

## 2021-05-31 VITALS
HEART RATE: 80 BPM | HEIGHT: 63 IN | BODY MASS INDEX: 28.71 KG/M2 | SYSTOLIC BLOOD PRESSURE: 119 MMHG | WEIGHT: 162.06 LBS | DIASTOLIC BLOOD PRESSURE: 76 MMHG

## 2021-05-31 DIAGNOSIS — R31.0 GROSS HEMATURIA: ICD-10-CM

## 2021-05-31 DIAGNOSIS — R31.0 GROSS HEMATURIA: Primary | ICD-10-CM

## 2021-05-31 DIAGNOSIS — R31.9 URINARY TRACT INFECTION WITH HEMATURIA, SITE UNSPECIFIED: ICD-10-CM

## 2021-05-31 DIAGNOSIS — Z88.8 ALLERGY TO IODINE: ICD-10-CM

## 2021-05-31 DIAGNOSIS — N39.0 URINARY TRACT INFECTION WITH HEMATURIA, SITE UNSPECIFIED: ICD-10-CM

## 2021-05-31 DIAGNOSIS — R35.0 URINARY FREQUENCY: ICD-10-CM

## 2021-05-31 DIAGNOSIS — R30.0 DYSURIA: ICD-10-CM

## 2021-05-31 LAB
BILIRUB SERPL-MCNC: ABNORMAL MG/DL
BLOOD URINE, POC: ABNORMAL
CLARITY, POC UA: ABNORMAL
COLOR, POC UA: YELLOW
CREAT SERPL-MCNC: 0.8 MG/DL (ref 0.5–1.4)
EST. GFR  (AFRICAN AMERICAN): >60 ML/MIN/1.73 M^2
EST. GFR  (NON AFRICAN AMERICAN): >60 ML/MIN/1.73 M^2
GLUCOSE UR QL STRIP: ABNORMAL
KETONES UR QL STRIP: ABNORMAL
LEUKOCYTE ESTERASE URINE, POC: ABNORMAL
NITRITE, POC UA: ABNORMAL
PH, POC UA: 5
PROTEIN, POC: ABNORMAL
SPECIFIC GRAVITY, POC UA: 1.01
UROBILINOGEN, POC UA: ABNORMAL

## 2021-05-31 PROCEDURE — 87086 URINE CULTURE/COLONY COUNT: CPT | Performed by: NURSE PRACTITIONER

## 2021-05-31 PROCEDURE — 99214 OFFICE O/P EST MOD 30 MIN: CPT | Mod: 25,,, | Performed by: NURSE PRACTITIONER

## 2021-05-31 PROCEDURE — 87077 CULTURE AEROBIC IDENTIFY: CPT | Performed by: NURSE PRACTITIONER

## 2021-05-31 PROCEDURE — 3288F FALL RISK ASSESSMENT DOCD: CPT | Mod: CPTII,,, | Performed by: NURSE PRACTITIONER

## 2021-05-31 PROCEDURE — 87186 SC STD MICRODIL/AGAR DIL: CPT | Performed by: NURSE PRACTITIONER

## 2021-05-31 PROCEDURE — 88112 CYTOPATH CELL ENHANCE TECH: CPT | Performed by: PATHOLOGY

## 2021-05-31 PROCEDURE — 81002 POCT URINE DIPSTICK WITHOUT MICROSCOPE: ICD-10-PCS | Mod: ,,, | Performed by: NURSE PRACTITIONER

## 2021-05-31 PROCEDURE — 51701 PR INSERTION OF NON-INDWELLING BLADDER CATHETERIZATION FOR RESIDUAL UR: ICD-10-PCS | Mod: ,,, | Performed by: NURSE PRACTITIONER

## 2021-05-31 PROCEDURE — 88112 PR  CYTOPATH, CELL ENHANCE TECH: ICD-10-PCS | Mod: 26,,, | Performed by: PATHOLOGY

## 2021-05-31 PROCEDURE — 1101F PT FALLS ASSESS-DOCD LE1/YR: CPT | Mod: CPTII,,, | Performed by: NURSE PRACTITIONER

## 2021-05-31 PROCEDURE — 82565 ASSAY OF CREATININE: CPT | Performed by: NURSE PRACTITIONER

## 2021-05-31 PROCEDURE — 88112 CYTOPATH CELL ENHANCE TECH: CPT | Mod: 26,,, | Performed by: PATHOLOGY

## 2021-05-31 PROCEDURE — 87088 URINE BACTERIA CULTURE: CPT | Performed by: NURSE PRACTITIONER

## 2021-05-31 PROCEDURE — 3008F PR BODY MASS INDEX (BMI) DOCUMENTED: ICD-10-PCS | Mod: CPTII,,, | Performed by: NURSE PRACTITIONER

## 2021-05-31 PROCEDURE — 36415 COLL VENOUS BLD VENIPUNCTURE: CPT | Performed by: NURSE PRACTITIONER

## 2021-05-31 PROCEDURE — 51701 INSERT BLADDER CATHETER: CPT | Mod: ,,, | Performed by: NURSE PRACTITIONER

## 2021-05-31 PROCEDURE — 3288F PR FALLS RISK ASSESSMENT DOCUMENTED: ICD-10-PCS | Mod: CPTII,,, | Performed by: NURSE PRACTITIONER

## 2021-05-31 PROCEDURE — 1126F PR PAIN SEVERITY QUANTIFIED, NO PAIN PRESENT: ICD-10-PCS | Mod: ,,, | Performed by: NURSE PRACTITIONER

## 2021-05-31 PROCEDURE — 1126F AMNT PAIN NOTED NONE PRSNT: CPT | Mod: ,,, | Performed by: NURSE PRACTITIONER

## 2021-05-31 PROCEDURE — 99999 PR PBB SHADOW E&M-EST. PATIENT-LVL V: CPT | Mod: PBBFAC,,, | Performed by: NURSE PRACTITIONER

## 2021-05-31 PROCEDURE — 1101F PR PT FALLS ASSESS DOC 0-1 FALLS W/OUT INJ PAST YR: ICD-10-PCS | Mod: CPTII,,, | Performed by: NURSE PRACTITIONER

## 2021-05-31 PROCEDURE — 99214 PR OFFICE/OUTPT VISIT, EST, LEVL IV, 30-39 MIN: ICD-10-PCS | Mod: 25,,, | Performed by: NURSE PRACTITIONER

## 2021-05-31 PROCEDURE — 3008F BODY MASS INDEX DOCD: CPT | Mod: CPTII,,, | Performed by: NURSE PRACTITIONER

## 2021-05-31 PROCEDURE — 81002 URINALYSIS NONAUTO W/O SCOPE: CPT | Mod: ,,, | Performed by: NURSE PRACTITIONER

## 2021-05-31 PROCEDURE — 99999 PR PBB SHADOW E&M-EST. PATIENT-LVL V: ICD-10-PCS | Mod: PBBFAC,,, | Performed by: NURSE PRACTITIONER

## 2021-05-31 RX ORDER — PHENAZOPYRIDINE HYDROCHLORIDE 100 MG/1
100 TABLET, FILM COATED ORAL 3 TIMES DAILY PRN
Qty: 30 TABLET | Refills: 0 | Status: SHIPPED | OUTPATIENT
Start: 2021-05-31 | End: 2021-06-10

## 2021-05-31 RX ORDER — PREDNISONE 50 MG/1
TABLET ORAL
Qty: 3 TABLET | Refills: 0 | Status: SHIPPED | OUTPATIENT
Start: 2021-05-31 | End: 2021-05-31

## 2021-05-31 RX ORDER — LIDOCAINE HYDROCHLORIDE 20 MG/ML
JELLY TOPICAL ONCE
Status: CANCELLED | OUTPATIENT
Start: 2021-05-31 | End: 2021-05-31

## 2021-05-31 RX ORDER — PREDNISONE 50 MG/1
TABLET ORAL
Qty: 3 TABLET | Refills: 0 | Status: SHIPPED | OUTPATIENT
Start: 2021-05-31 | End: 2022-04-07

## 2021-05-31 RX ORDER — SULFAMETHOXAZOLE AND TRIMETHOPRIM 400; 80 MG/1; MG/1
1 TABLET ORAL 2 TIMES DAILY
Qty: 14 TABLET | Refills: 0 | Status: SHIPPED | OUTPATIENT
Start: 2021-05-31 | End: 2021-06-07

## 2021-05-31 RX ORDER — CIPROFLOXACIN 500 MG/1
500 TABLET ORAL ONCE
Status: CANCELLED | OUTPATIENT
Start: 2021-05-31 | End: 2021-05-31

## 2021-06-01 LAB
FINAL PATHOLOGIC DIAGNOSIS: NORMAL
Lab: NORMAL

## 2021-06-03 LAB — BACTERIA UR CULT: ABNORMAL

## 2021-06-04 ENCOUNTER — HOSPITAL ENCOUNTER (OUTPATIENT)
Dept: RADIOLOGY | Facility: HOSPITAL | Age: 66
Discharge: HOME | End: 2021-06-04
Attending: NURSE PRACTITIONER
Payer: MEDICARE

## 2021-06-04 DIAGNOSIS — R31.0 GROSS HEMATURIA: ICD-10-CM

## 2021-06-04 PROCEDURE — 74178 CT UROGRAM ABD PELVIS W WO: ICD-10-PCS | Mod: 26,,, | Performed by: RADIOLOGY

## 2021-06-04 PROCEDURE — 74178 CT ABD&PLV WO CNTR FLWD CNTR: CPT | Mod: 26,,, | Performed by: RADIOLOGY

## 2021-06-04 PROCEDURE — 25500020 PHARM REV CODE 255: Performed by: NURSE PRACTITIONER

## 2021-06-04 PROCEDURE — 74178 CT ABD&PLV WO CNTR FLWD CNTR: CPT | Mod: TC

## 2021-06-04 RX ADMIN — IOHEXOL 125 ML: 350 INJECTION, SOLUTION INTRAVENOUS at 05:06

## 2021-06-09 ENCOUNTER — PROCEDURE VISIT (OUTPATIENT)
Dept: UROLOGY | Facility: CLINIC | Age: 66
End: 2021-06-09
Payer: MEDICARE

## 2021-06-09 VITALS
SYSTOLIC BLOOD PRESSURE: 136 MMHG | TEMPERATURE: 97 F | BODY MASS INDEX: 29.02 KG/M2 | WEIGHT: 163.81 LBS | DIASTOLIC BLOOD PRESSURE: 71 MMHG | HEIGHT: 63 IN | HEART RATE: 70 BPM | RESPIRATION RATE: 16 BRPM

## 2021-06-09 DIAGNOSIS — R31.0 GROSS HEMATURIA: ICD-10-CM

## 2021-06-09 PROCEDURE — 52000 CYSTOSCOPY: ICD-10-PCS | Mod: S$GLB,,, | Performed by: UROLOGY

## 2021-06-09 PROCEDURE — 52000 CYSTOURETHROSCOPY: CPT | Mod: S$GLB,,, | Performed by: UROLOGY

## 2021-06-09 RX ORDER — LIDOCAINE HYDROCHLORIDE 20 MG/ML
JELLY TOPICAL ONCE
Status: COMPLETED | OUTPATIENT
Start: 2021-06-09 | End: 2021-06-09

## 2021-06-09 RX ORDER — CIPROFLOXACIN 500 MG/1
500 TABLET ORAL ONCE
Status: COMPLETED | OUTPATIENT
Start: 2021-06-09 | End: 2021-06-09

## 2021-06-09 RX ADMIN — LIDOCAINE HYDROCHLORIDE: 20 JELLY TOPICAL at 11:06

## 2021-06-09 RX ADMIN — CIPROFLOXACIN 500 MG: 500 TABLET ORAL at 11:06

## 2021-08-02 ENCOUNTER — TELEPHONE (OUTPATIENT)
Dept: INTERNAL MEDICINE | Facility: CLINIC | Age: 66
End: 2021-08-02

## 2021-08-02 DIAGNOSIS — I10 BENIGN ESSENTIAL HYPERTENSION: ICD-10-CM

## 2021-08-02 RX ORDER — CARVEDILOL 25 MG/1
25 TABLET ORAL 2 TIMES DAILY WITH MEALS
Qty: 60 TABLET | Refills: 0 | Status: SHIPPED | OUTPATIENT
Start: 2021-08-02 | End: 2021-11-29

## 2021-10-04 ENCOUNTER — PATIENT MESSAGE (OUTPATIENT)
Dept: ADMINISTRATIVE | Facility: HOSPITAL | Age: 66
End: 2021-10-04

## 2021-10-28 ENCOUNTER — TELEPHONE (OUTPATIENT)
Dept: INTERNAL MEDICINE | Facility: CLINIC | Age: 66
End: 2021-10-28
Payer: MEDICARE

## 2021-10-29 ENCOUNTER — TELEPHONE (OUTPATIENT)
Dept: INTERNAL MEDICINE | Facility: CLINIC | Age: 66
End: 2021-10-29
Payer: MEDICARE

## 2021-11-23 ENCOUNTER — TELEPHONE (OUTPATIENT)
Dept: INTERNAL MEDICINE | Facility: CLINIC | Age: 66
End: 2021-11-23

## 2021-11-23 ENCOUNTER — HOSPITAL ENCOUNTER (OUTPATIENT)
Dept: RADIOLOGY | Facility: HOSPITAL | Age: 66
Discharge: HOME | End: 2021-11-23
Attending: INTERNAL MEDICINE
Payer: MEDICARE

## 2021-11-23 ENCOUNTER — IMMUNIZATION (OUTPATIENT)
Dept: INTERNAL MEDICINE | Facility: CLINIC | Age: 66
End: 2021-11-23
Payer: MEDICARE

## 2021-11-23 ENCOUNTER — OFFICE VISIT (OUTPATIENT)
Dept: INTERNAL MEDICINE | Facility: CLINIC | Age: 66
End: 2021-11-23
Payer: MEDICARE

## 2021-11-23 VITALS
WEIGHT: 164.69 LBS | BODY MASS INDEX: 29.18 KG/M2 | TEMPERATURE: 98 F | DIASTOLIC BLOOD PRESSURE: 80 MMHG | HEIGHT: 63 IN | HEART RATE: 71 BPM | SYSTOLIC BLOOD PRESSURE: 132 MMHG | OXYGEN SATURATION: 99 %

## 2021-11-23 DIAGNOSIS — E78.5 HYPERLIPIDEMIA, UNSPECIFIED HYPERLIPIDEMIA TYPE: Chronic | ICD-10-CM

## 2021-11-23 DIAGNOSIS — I70.0 AORTIC ATHEROSCLEROSIS: ICD-10-CM

## 2021-11-23 DIAGNOSIS — M25.561 CHRONIC PAIN OF RIGHT KNEE: ICD-10-CM

## 2021-11-23 DIAGNOSIS — G89.29 CHRONIC PAIN OF RIGHT KNEE: ICD-10-CM

## 2021-11-23 DIAGNOSIS — I65.23 BILATERAL CAROTID ARTERY STENOSIS: ICD-10-CM

## 2021-11-23 DIAGNOSIS — I10 BENIGN ESSENTIAL HYPERTENSION: Primary | Chronic | ICD-10-CM

## 2021-11-23 DIAGNOSIS — Z12.31 ENCOUNTER FOR SCREENING MAMMOGRAM FOR MALIGNANT NEOPLASM OF BREAST: ICD-10-CM

## 2021-11-23 PROBLEM — R07.9 CHEST PAIN: Status: RESOLVED | Noted: 2018-08-14 | Resolved: 2021-11-23

## 2021-11-23 PROCEDURE — 90694 FLU VACCINE - QUADRIVALENT - ADJUVANTED: ICD-10-PCS | Mod: S$GLB,,, | Performed by: INTERNAL MEDICINE

## 2021-11-23 PROCEDURE — 73562 X-RAY EXAM OF KNEE 3: CPT | Mod: 26,50,, | Performed by: RADIOLOGY

## 2021-11-23 PROCEDURE — 73562 XR KNEE ORTHO BILAT: ICD-10-PCS | Mod: 26,50,, | Performed by: RADIOLOGY

## 2021-11-23 PROCEDURE — 90694 VACC AIIV4 NO PRSRV 0.5ML IM: CPT | Mod: S$GLB,,, | Performed by: INTERNAL MEDICINE

## 2021-11-23 PROCEDURE — G0008 FLU VACCINE - QUADRIVALENT - ADJUVANTED: ICD-10-PCS | Mod: S$GLB,,, | Performed by: INTERNAL MEDICINE

## 2021-11-23 PROCEDURE — 99999 PR PBB SHADOW E&M-EST. PATIENT-LVL V: ICD-10-PCS | Mod: PBBFAC,,, | Performed by: INTERNAL MEDICINE

## 2021-11-23 PROCEDURE — 73562 X-RAY EXAM OF KNEE 3: CPT | Mod: TC,50

## 2021-11-23 PROCEDURE — G0008 ADMIN INFLUENZA VIRUS VAC: HCPCS | Mod: S$GLB,,, | Performed by: INTERNAL MEDICINE

## 2021-11-23 PROCEDURE — 99999 PR PBB SHADOW E&M-EST. PATIENT-LVL V: CPT | Mod: PBBFAC,,, | Performed by: INTERNAL MEDICINE

## 2021-11-23 PROCEDURE — 4010F ACE/ARB THERAPY RXD/TAKEN: CPT | Mod: CPTII,S$GLB,, | Performed by: INTERNAL MEDICINE

## 2021-11-23 PROCEDURE — 99214 OFFICE O/P EST MOD 30 MIN: CPT | Mod: 25,S$GLB,, | Performed by: INTERNAL MEDICINE

## 2021-11-23 PROCEDURE — 4010F PR ACE/ARB THEARPY RXD/TAKEN: ICD-10-PCS | Mod: CPTII,S$GLB,, | Performed by: INTERNAL MEDICINE

## 2021-11-23 PROCEDURE — 99214 PR OFFICE/OUTPT VISIT, EST, LEVL IV, 30-39 MIN: ICD-10-PCS | Mod: 25,S$GLB,, | Performed by: INTERNAL MEDICINE

## 2021-11-29 DIAGNOSIS — I10 BENIGN ESSENTIAL HYPERTENSION: ICD-10-CM

## 2021-11-29 RX ORDER — CARVEDILOL 25 MG/1
25 TABLET ORAL 2 TIMES DAILY WITH MEALS
Qty: 180 TABLET | Refills: 3 | Status: SHIPPED | OUTPATIENT
Start: 2021-11-29 | End: 2023-02-22

## 2021-11-30 ENCOUNTER — LAB VISIT (OUTPATIENT)
Dept: LAB | Facility: HOSPITAL | Age: 66
End: 2021-11-30
Attending: INTERNAL MEDICINE
Payer: MEDICARE

## 2021-11-30 DIAGNOSIS — I65.23 BILATERAL CAROTID ARTERY STENOSIS: ICD-10-CM

## 2021-11-30 DIAGNOSIS — E78.5 HYPERLIPIDEMIA, UNSPECIFIED HYPERLIPIDEMIA TYPE: Chronic | ICD-10-CM

## 2021-11-30 DIAGNOSIS — I10 BENIGN ESSENTIAL HYPERTENSION: Chronic | ICD-10-CM

## 2021-11-30 DIAGNOSIS — I70.0 AORTIC ATHEROSCLEROSIS: ICD-10-CM

## 2021-11-30 LAB
ALBUMIN SERPL BCP-MCNC: 3.9 G/DL (ref 3.5–5.2)
ALP SERPL-CCNC: 75 U/L (ref 55–135)
ALT SERPL W/O P-5'-P-CCNC: 25 U/L (ref 10–44)
ANION GAP SERPL CALC-SCNC: 7 MMOL/L (ref 8–16)
AST SERPL-CCNC: 22 U/L (ref 10–40)
BASOPHILS # BLD AUTO: 0.03 K/UL (ref 0–0.2)
BASOPHILS NFR BLD: 0.6 % (ref 0–1.9)
BILIRUB SERPL-MCNC: 0.5 MG/DL (ref 0.1–1)
BUN SERPL-MCNC: 19 MG/DL (ref 8–23)
CALCIUM SERPL-MCNC: 9.3 MG/DL (ref 8.7–10.5)
CHLORIDE SERPL-SCNC: 105 MMOL/L (ref 95–110)
CHOLEST SERPL-MCNC: 167 MG/DL (ref 120–199)
CHOLEST/HDLC SERPL: 4.6 {RATIO} (ref 2–5)
CO2 SERPL-SCNC: 29 MMOL/L (ref 23–29)
CREAT SERPL-MCNC: 0.7 MG/DL (ref 0.5–1.4)
DIFFERENTIAL METHOD: ABNORMAL
EOSINOPHIL # BLD AUTO: 0.2 K/UL (ref 0–0.5)
EOSINOPHIL NFR BLD: 3.9 % (ref 0–8)
ERYTHROCYTE [DISTWIDTH] IN BLOOD BY AUTOMATED COUNT: 13.8 % (ref 11.5–14.5)
EST. GFR  (AFRICAN AMERICAN): >60 ML/MIN/1.73 M^2
EST. GFR  (NON AFRICAN AMERICAN): >60 ML/MIN/1.73 M^2
GLUCOSE SERPL-MCNC: 106 MG/DL (ref 70–110)
HCT VFR BLD AUTO: 34.1 % (ref 37–48.5)
HDLC SERPL-MCNC: 36 MG/DL (ref 40–75)
HDLC SERPL: 21.6 % (ref 20–50)
HGB BLD-MCNC: 11.4 G/DL (ref 12–16)
IMM GRANULOCYTES # BLD AUTO: 0.01 K/UL (ref 0–0.04)
IMM GRANULOCYTES NFR BLD AUTO: 0.2 % (ref 0–0.5)
LDLC SERPL CALC-MCNC: 113 MG/DL (ref 63–159)
LYMPHOCYTES # BLD AUTO: 1.5 K/UL (ref 1–4.8)
LYMPHOCYTES NFR BLD: 29.8 % (ref 18–48)
MCH RBC QN AUTO: 31.9 PG (ref 27–31)
MCHC RBC AUTO-ENTMCNC: 33.4 G/DL (ref 32–36)
MCV RBC AUTO: 96 FL (ref 82–98)
MONOCYTES # BLD AUTO: 0.5 K/UL (ref 0.3–1)
MONOCYTES NFR BLD: 10.4 % (ref 4–15)
NEUTROPHILS # BLD AUTO: 2.7 K/UL (ref 1.8–7.7)
NEUTROPHILS NFR BLD: 55.1 % (ref 38–73)
NONHDLC SERPL-MCNC: 131 MG/DL
NRBC BLD-RTO: 0 /100 WBC
PLATELET # BLD AUTO: 230 K/UL (ref 150–450)
PMV BLD AUTO: 9.7 FL (ref 9.2–12.9)
POTASSIUM SERPL-SCNC: 4.1 MMOL/L (ref 3.5–5.1)
PROT SERPL-MCNC: 7 G/DL (ref 6–8.4)
RBC # BLD AUTO: 3.57 M/UL (ref 4–5.4)
SODIUM SERPL-SCNC: 141 MMOL/L (ref 136–145)
TRIGL SERPL-MCNC: 90 MG/DL (ref 30–150)
TSH SERPL DL<=0.005 MIU/L-ACNC: 0.9 UIU/ML (ref 0.4–4)
WBC # BLD AUTO: 4.9 K/UL (ref 3.9–12.7)

## 2021-11-30 PROCEDURE — 84443 ASSAY THYROID STIM HORMONE: CPT | Performed by: INTERNAL MEDICINE

## 2021-11-30 PROCEDURE — 36415 COLL VENOUS BLD VENIPUNCTURE: CPT | Performed by: INTERNAL MEDICINE

## 2021-11-30 PROCEDURE — 85025 COMPLETE CBC W/AUTO DIFF WBC: CPT | Performed by: INTERNAL MEDICINE

## 2021-11-30 PROCEDURE — 80053 COMPREHEN METABOLIC PANEL: CPT | Performed by: INTERNAL MEDICINE

## 2021-11-30 PROCEDURE — 80061 LIPID PANEL: CPT | Performed by: INTERNAL MEDICINE

## 2022-02-02 ENCOUNTER — IMMUNIZATION (OUTPATIENT)
Dept: PHARMACY | Facility: CLINIC | Age: 67
End: 2022-02-02
Payer: MEDICARE

## 2022-02-02 ENCOUNTER — HOSPITAL ENCOUNTER (OUTPATIENT)
Dept: RADIOLOGY | Facility: HOSPITAL | Age: 67
Discharge: HOME | End: 2022-02-02
Attending: INTERNAL MEDICINE
Payer: MEDICARE

## 2022-02-02 VITALS — BODY MASS INDEX: 29.18 KG/M2 | WEIGHT: 164.69 LBS | HEIGHT: 63 IN

## 2022-02-02 DIAGNOSIS — Z12.31 ENCOUNTER FOR SCREENING MAMMOGRAM FOR MALIGNANT NEOPLASM OF BREAST: ICD-10-CM

## 2022-02-02 PROCEDURE — 77067 SCR MAMMO BI INCL CAD: CPT | Mod: 26,,, | Performed by: RADIOLOGY

## 2022-02-02 PROCEDURE — 77063 MAMMO DIGITAL SCREENING BILAT WITH TOMO: ICD-10-PCS | Mod: 26,,, | Performed by: RADIOLOGY

## 2022-02-02 PROCEDURE — 77063 BREAST TOMOSYNTHESIS BI: CPT | Mod: 26,,, | Performed by: RADIOLOGY

## 2022-02-02 PROCEDURE — 77067 MAMMO DIGITAL SCREENING BILAT WITH TOMO: ICD-10-PCS | Mod: 26,,, | Performed by: RADIOLOGY

## 2022-02-02 PROCEDURE — 77067 SCR MAMMO BI INCL CAD: CPT | Mod: TC

## 2022-02-02 PROCEDURE — 77063 BREAST TOMOSYNTHESIS BI: CPT | Mod: TC

## 2022-04-06 ENCOUNTER — TELEPHONE (OUTPATIENT)
Dept: PRIMARY CARE CLINIC | Facility: CLINIC | Age: 67
End: 2022-04-06
Payer: MEDICARE
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
## 2022-04-07 ENCOUNTER — OFFICE VISIT (OUTPATIENT)
Dept: PRIMARY CARE CLINIC | Facility: CLINIC | Age: 67
End: 2022-04-07
Payer: MEDICARE

## 2022-04-07 VITALS
HEIGHT: 63 IN | WEIGHT: 160.94 LBS | OXYGEN SATURATION: 95 % | BODY MASS INDEX: 28.52 KG/M2 | SYSTOLIC BLOOD PRESSURE: 118 MMHG | DIASTOLIC BLOOD PRESSURE: 84 MMHG | HEART RATE: 75 BPM | TEMPERATURE: 98 F

## 2022-04-07 DIAGNOSIS — M54.2 NECK PAIN ON LEFT SIDE: Primary | ICD-10-CM

## 2022-04-07 DIAGNOSIS — M54.9 PAIN OF MID BACK: ICD-10-CM

## 2022-04-07 DIAGNOSIS — I70.0 AORTIC ATHEROSCLEROSIS: ICD-10-CM

## 2022-04-07 DIAGNOSIS — E78.5 HYPERLIPIDEMIA, UNSPECIFIED HYPERLIPIDEMIA TYPE: ICD-10-CM

## 2022-04-07 DIAGNOSIS — I10 BENIGN ESSENTIAL HYPERTENSION: ICD-10-CM

## 2022-04-07 DIAGNOSIS — I77.9 BILATERAL CAROTID ARTERY DISEASE, UNSPECIFIED TYPE: ICD-10-CM

## 2022-04-07 PROCEDURE — 3074F PR MOST RECENT SYSTOLIC BLOOD PRESSURE < 130 MM HG: ICD-10-PCS | Mod: CPTII,S$GLB,, | Performed by: INTERNAL MEDICINE

## 2022-04-07 PROCEDURE — 4010F ACE/ARB THERAPY RXD/TAKEN: CPT | Mod: CPTII,S$GLB,, | Performed by: INTERNAL MEDICINE

## 2022-04-07 PROCEDURE — 99999 PR PBB SHADOW E&M-EST. PATIENT-LVL IV: CPT | Mod: PBBFAC,,, | Performed by: INTERNAL MEDICINE

## 2022-04-07 PROCEDURE — 1159F PR MEDICATION LIST DOCUMENTED IN MEDICAL RECORD: ICD-10-PCS | Mod: CPTII,S$GLB,, | Performed by: INTERNAL MEDICINE

## 2022-04-07 PROCEDURE — 3074F SYST BP LT 130 MM HG: CPT | Mod: CPTII,S$GLB,, | Performed by: INTERNAL MEDICINE

## 2022-04-07 PROCEDURE — 1125F AMNT PAIN NOTED PAIN PRSNT: CPT | Mod: CPTII,S$GLB,, | Performed by: INTERNAL MEDICINE

## 2022-04-07 PROCEDURE — 3008F PR BODY MASS INDEX (BMI) DOCUMENTED: ICD-10-PCS | Mod: CPTII,S$GLB,, | Performed by: INTERNAL MEDICINE

## 2022-04-07 PROCEDURE — 4010F PR ACE/ARB THEARPY RXD/TAKEN: ICD-10-PCS | Mod: CPTII,S$GLB,, | Performed by: INTERNAL MEDICINE

## 2022-04-07 PROCEDURE — 3079F DIAST BP 80-89 MM HG: CPT | Mod: CPTII,S$GLB,, | Performed by: INTERNAL MEDICINE

## 2022-04-07 PROCEDURE — 99999 PR PBB SHADOW E&M-EST. PATIENT-LVL IV: ICD-10-PCS | Mod: PBBFAC,,, | Performed by: INTERNAL MEDICINE

## 2022-04-07 PROCEDURE — 1125F PR PAIN SEVERITY QUANTIFIED, PAIN PRESENT: ICD-10-PCS | Mod: CPTII,S$GLB,, | Performed by: INTERNAL MEDICINE

## 2022-04-07 PROCEDURE — 1101F PT FALLS ASSESS-DOCD LE1/YR: CPT | Mod: CPTII,S$GLB,, | Performed by: INTERNAL MEDICINE

## 2022-04-07 PROCEDURE — 3008F BODY MASS INDEX DOCD: CPT | Mod: CPTII,S$GLB,, | Performed by: INTERNAL MEDICINE

## 2022-04-07 PROCEDURE — 99214 PR OFFICE/OUTPT VISIT, EST, LEVL IV, 30-39 MIN: ICD-10-PCS | Mod: S$GLB,,, | Performed by: INTERNAL MEDICINE

## 2022-04-07 PROCEDURE — 1101F PR PT FALLS ASSESS DOC 0-1 FALLS W/OUT INJ PAST YR: ICD-10-PCS | Mod: CPTII,S$GLB,, | Performed by: INTERNAL MEDICINE

## 2022-04-07 PROCEDURE — 3079F PR MOST RECENT DIASTOLIC BLOOD PRESSURE 80-89 MM HG: ICD-10-PCS | Mod: CPTII,S$GLB,, | Performed by: INTERNAL MEDICINE

## 2022-04-07 PROCEDURE — 1160F RVW MEDS BY RX/DR IN RCRD: CPT | Mod: CPTII,S$GLB,, | Performed by: INTERNAL MEDICINE

## 2022-04-07 PROCEDURE — 3288F FALL RISK ASSESSMENT DOCD: CPT | Mod: CPTII,S$GLB,, | Performed by: INTERNAL MEDICINE

## 2022-04-07 PROCEDURE — 3288F PR FALLS RISK ASSESSMENT DOCUMENTED: ICD-10-PCS | Mod: CPTII,S$GLB,, | Performed by: INTERNAL MEDICINE

## 2022-04-07 PROCEDURE — 99214 OFFICE O/P EST MOD 30 MIN: CPT | Mod: S$GLB,,, | Performed by: INTERNAL MEDICINE

## 2022-04-07 PROCEDURE — 1159F MED LIST DOCD IN RCRD: CPT | Mod: CPTII,S$GLB,, | Performed by: INTERNAL MEDICINE

## 2022-04-07 PROCEDURE — 1160F PR REVIEW ALL MEDS BY PRESCRIBER/CLIN PHARMACIST DOCUMENTED: ICD-10-PCS | Mod: CPTII,S$GLB,, | Performed by: INTERNAL MEDICINE

## 2022-04-07 RX ORDER — NAPROXEN 500 MG/1
500 TABLET ORAL 2 TIMES DAILY WITH MEALS
Qty: 14 TABLET | Refills: 0 | Status: SHIPPED | OUTPATIENT
Start: 2022-04-07 | End: 2022-04-14

## 2022-04-07 RX ORDER — CYCLOBENZAPRINE HCL 5 MG
5 TABLET ORAL 3 TIMES DAILY PRN
Qty: 30 TABLET | Refills: 0 | Status: SHIPPED | OUTPATIENT
Start: 2022-04-07 | End: 2022-04-17
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
## 2022-04-12 ENCOUNTER — CLINICAL SUPPORT (OUTPATIENT)
Dept: REHABILITATION | Facility: HOSPITAL | Age: 67
End: 2022-04-12
Attending: INTERNAL MEDICINE
Payer: MEDICARE

## 2022-04-12 DIAGNOSIS — M54.2 PAINFUL CERVICAL RANGE OF MOTION: ICD-10-CM

## 2022-04-12 DIAGNOSIS — R29.898 UPPER EXTREMITY WEAKNESS: ICD-10-CM

## 2022-04-12 DIAGNOSIS — M54.9 PAIN OF MID BACK: ICD-10-CM

## 2022-04-12 DIAGNOSIS — M54.2 CERVICAL PAIN (NECK): ICD-10-CM

## 2022-04-12 DIAGNOSIS — M54.2 NECK PAIN ON LEFT SIDE: ICD-10-CM

## 2022-04-12 PROCEDURE — 97110 THERAPEUTIC EXERCISES: CPT | Mod: PN

## 2022-04-12 PROCEDURE — 97161 PT EVAL LOW COMPLEX 20 MIN: CPT | Mod: PN
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
## 2022-05-05 ENCOUNTER — CLINICAL SUPPORT (OUTPATIENT)
Dept: REHABILITATION | Facility: HOSPITAL | Age: 67
End: 2022-05-05
Attending: INTERNAL MEDICINE
Payer: MEDICARE

## 2022-05-05 DIAGNOSIS — R29.898 UPPER EXTREMITY WEAKNESS: ICD-10-CM

## 2022-05-05 DIAGNOSIS — M54.2 PAINFUL CERVICAL RANGE OF MOTION: ICD-10-CM

## 2022-05-05 DIAGNOSIS — M54.2 CERVICAL PAIN (NECK): Primary | ICD-10-CM

## 2022-05-05 PROCEDURE — 97140 MANUAL THERAPY 1/> REGIONS: CPT | Mod: PN

## 2022-05-05 PROCEDURE — 97110 THERAPEUTIC EXERCISES: CPT | Mod: PN

## 2022-05-05 NOTE — PROGRESS NOTES
[facility]   Physical Therapy Treatment Note     Name: Nga Livingston  Clinic Number: [id number]    Therapy Diagnosis:   Encounter Diagnoses   Name Primary?    Cervical pain (neck) Yes    Painful cervical range of motion     Upper extremity weakness      Physician: Elaine Mahmood MD    Visit Date: 5/5/2022     Physician Orders: PT Eval and Treat   Medical Diagnosis from Referral:   M54.2 (ICD-10-CM) - Neck pain on left side   M54.9 (ICD-10-CM) - Pain of mid back      Evaluation Date: 4/12/2022  Authorization Period Expiration: 12/31/2022  Plan of Care Expiration: 7-12-22  Visit # / Visits authorized: 1/ 25   Progress Note Due: 5-12-22  FOTO: 1/ 1     Precautions: Standard    Time In: 10:50 am  Time Out: 11:30 am  Total Billable Time: 45 minutes      SUBJECTIVE     Pt reports: that she cont with L cervical pain. Pt states pain today is 6/10.   She was compliant with home exercise program.  Response to previous treatment: None  Functional change: No change     Pain: 6/10  Location: left cervical and mid-back      OBJECTIVE     Objective Measures updated at progress report unless specified.       TREATMENT     Nga received the treatments listed below:      received therapeutic exercises to develop strength and ROM for 30  minutes including:    UBE 6 min  Seated upper traps stretch + heat pack 3x30 sec 3a  Seated levator scap stretch  + heat pack  3x30 sec ea   Seated scap retraction 3x10  Supine cervical rotation 20x  Supine cervical flexion 20x   Supine shoulder flexion 20x     received the following manual therapy techniques: Soft tissue Mobilization were applied to the: L cervical for 10  minutes, including:  L cervical and upper traps STM    PATIENT EDUCATION AND HOME EXERCISES     Home Exercises and Patient Education Provided     Education provided:   - Perform HEP 2 times per day     Written Home Exercises Provided: Patient instructed to cont prior HEP.  Exercises were reviewed and  Nga was able to demonstrate them prior to the end of the session.  Nga demonstrated good  understanding of the education provided.      See EMR under Patient Instructions for exercises provided 4/12/2022.    ASSESSMENT     Pt tolerated PT session good. Pt cont with provocation L cervical and upper traps pain. Postural exercises performed today. She required mod v/c's to perform there with proper from. STM was performed of L cervical and upper traps. Sof tissue restriction noted at L upper traps. Pt will benefit from skilled PT services to decrease pain and return to PLOF.     Nga Is progressing well towards her goals.   Pt prognosis is Fair.     Pt will continue to benefit from skilled outpatient physical therapy to address the deficits listed in the problem list box on initial evaluation, provide pt/family education and to maximize pt's level of independence in the home and community environment.     Pt's spiritual, cultural and educational needs considered and pt agreeable to plan of care and goals.     Anticipated barriers to physical therapy: Transportation     GOALS: Short Term Goals: 4 weeks  1.Report decreased in pain at worse less than  <   / =  5  /10  to increase tolerance for functional activities. On going  2. Pt to improve cervical range of motion  by 25% to allow for improved functional mobility to allow for improvement in IADLs.  On going  3. Increased   B UE MMT 1/2  grade to increase tolerance for ADL and work activities. On going  4. Pt to report able to return to daily house shores with 50% less cervical pain to improve community of life   5. Pt to tolerate HEP to improve ROM and independence with ADL's. On going     Long Term Goals: 8 weeks  1.Report decreased in pain at worse less than  <   / =  2  /10  to increase tolerance for functional activities. On going  2.Pt to improve cervical range of motion by 75% to allow for improved functional mobility to allow for improvement in IADLs. On  going  3.Increased B UE  MMT  1 grade  to increase tolerance for ADL and work activities.On going  4.  Pt will report 43%   on FOTO neck survey score for neck pain disability to demonstrate decrease in disability and improvement in neck pain.On going  5. Pt to be Independent with HEP to improve ROM and independence with ADL's. On going    PLAN     Plan of care Certification: 4/12/2022 to 7-12-22    Edi Ramirez PT

## 2022-05-09 NOTE — PROGRESS NOTES
[facility]   Physical Therapy Treatment Note     Name: Nga Livingston  [facility] Number: [id number]    Therapy Diagnosis:   Encounter Diagnoses   Name Primary?    Cervical pain (neck) Yes    Painful cervical range of motion     Upper extremity weakness      Physician: Elaine Mahmood MD    Visit Date: 5/10/2022     Physician Orders: PT Eval and Treat   Medical Diagnosis from Referral:   M54.2 (ICD-10-CM) - Neck pain on left side   M54.9 (ICD-10-CM) - Pain of mid back      Evaluation Date: 4/12/2022  Authorization Period Expiration: 12/31/2022  Plan of Care Expiration: 7-12-22  Visit # / Visits authorized: 2/ 25   Progress Note Due: 5-12-22  FOTO: 1/ 1     Precautions: Standard    Time In: 9:15 am  Time Out: 10:00 am  Total Billable Time: 45 minutes      SUBJECTIVE     Pt reports: that she cont with L cervical pain. Pt states pain today is 3/10.   She was compliant with home exercise program.  Response to previous treatment: None  Functional change: No change     Pain: 3/10  Location: left cervical and mid-back      OBJECTIVE     Objective Measures updated at progress report unless specified.       TREATMENT     Nga received the treatments listed below:      received therapeutic exercises to develop strength and ROM for 30  minutes including:    UBE 6 min  Seated upper traps stretch + heat pack 3x30 sec 3a  Seated levator scap stretch  + heat pack  3x30 sec ea   Seated scap retraction 3x10  Seatedcervical rotation 20x  Seated cervical flexion 20x   Seated shoulder flexion 20x   No moneys' YTB 30x  Shoulder extension YTB 30x   Scapular retraction YTB 30x     received the following manual therapy techniques: Soft tissue Mobilization were applied to the: L cervical for 00  minutes, including:  L cervical and upper traps STM    PATIENT EDUCATION AND HOME EXERCISES     Home Exercises and Patient Education Provided     Education provided:   - Perform HEP 2 times per day     Written Home Exercises  Provided: Patient instructed to cont prior HEP.  Exercises were reviewed and Nga was able to demonstrate them prior to the end of the session.  Nga demonstrated good  understanding of the education provided.      See EMR under Patient Instructions for exercises provided 4/12/2022.    ASSESSMENT     Pt tolerated PT session good. Pt cont minor with provocation L cervical and upper traps pain. Postural exercises performed today. Pt required mod v/c's to perform exercises with proper muscles activation. Manual therapy was not performed today.  Pt will benefit from skilled PT services to decrease pain and return to PLOF.     Nga Is progressing well towards her goals.   Pt prognosis is Fair.     Pt will continue to benefit from skilled outpatient physical therapy to address the deficits listed in the problem list box on initial evaluation, provide pt/family education and to maximize pt's level of independence in the home and community environment.     Pt's spiritual, cultural and educational needs considered and pt agreeable to plan of care and goals.     Anticipated barriers to physical therapy: Transportation     GOALS: Short Term Goals: 4 weeks  1.Report decreased in pain at worse less than  <   / =  5  /10  to increase tolerance for functional activities. On going  2. Pt to improve cervical range of motion  by 25% to allow for improved functional mobility to allow for improvement in IADLs.  On going  3. Increased   B UE MMT 1/2  grade to increase tolerance for ADL and work activities. On going  4. Pt to report able to return to daily house shores with 50% less cervical pain to improve community of life   5. Pt to tolerate HEP to improve ROM and independence with ADL's. On going     Long Term Goals: 8 weeks  1.Report decreased in pain at worse less than  <   / =  2  /10  to increase tolerance for functional activities. On going  2.Pt to improve cervical range of motion by 75% to allow for improved functional  mobility to allow for improvement in IADLs. On going  3.Increased B UE  MMT  1 grade  to increase tolerance for ADL and work activities.On going  4.  Pt will report 43%   on FOTO neck survey score for neck pain disability to demonstrate decrease in disability and improvement in neck pain.On going  5. Pt to be Independent with HEP to improve ROM and independence with ADL's. On going    PLAN     Plan of care Certification: 4/12/2022 to 7-12-22    Edi Ramirez, PT

## 2022-05-10 ENCOUNTER — CLINICAL SUPPORT (OUTPATIENT)
Dept: REHABILITATION | Facility: HOSPITAL | Age: 67
End: 2022-05-10
Attending: INTERNAL MEDICINE
Payer: MEDICARE

## 2022-05-10 DIAGNOSIS — M54.2 CERVICAL PAIN (NECK): Primary | ICD-10-CM

## 2022-05-10 DIAGNOSIS — R29.898 UPPER EXTREMITY WEAKNESS: ICD-10-CM

## 2022-05-10 DIAGNOSIS — M54.2 PAINFUL CERVICAL RANGE OF MOTION: ICD-10-CM

## 2022-05-10 PROCEDURE — 97110 THERAPEUTIC EXERCISES: CPT | Mod: PN

## 2022-05-12 ENCOUNTER — CLINICAL SUPPORT (OUTPATIENT)
Dept: REHABILITATION | Facility: HOSPITAL | Age: 67
End: 2022-05-12
Attending: INTERNAL MEDICINE
Payer: MEDICARE

## 2022-05-12 DIAGNOSIS — M54.2 CERVICAL PAIN (NECK): Primary | ICD-10-CM

## 2022-05-12 DIAGNOSIS — R29.898 UPPER EXTREMITY WEAKNESS: ICD-10-CM

## 2022-05-12 DIAGNOSIS — M54.2 PAINFUL CERVICAL RANGE OF MOTION: ICD-10-CM

## 2022-05-12 PROCEDURE — 97110 THERAPEUTIC EXERCISES: CPT | Mod: PN

## 2022-05-12 NOTE — PROGRESS NOTES
[facility]   Physical Therapy Treatment Note     Name: Nga Livingston  [facility] Number: [id number]    Therapy Diagnosis:   Encounter Diagnoses   Name Primary?    Cervical pain (neck) Yes    Painful cervical range of motion     Upper extremity weakness      Physician: Elaine Mahmood MD    Visit Date: 5/12/2022     Physician Orders: PT Eval and Treat   Medical Diagnosis from Referral:   M54.2 (ICD-10-CM) - Neck pain on left side   M54.9 (ICD-10-CM) - Pain of mid back      Evaluation Date: 4/12/2022  Authorization Period Expiration: 12/31/2022  Plan of Care Expiration: 7-12-22  Visit # / Visits authorized: 3/ 25   Progress Note Due: 6-12-22  FOTO: 1/ 1     Precautions: Standard    Time In: 8:36 am  Time Out: 9:15 am  Total Billable Time: 39minutes      SUBJECTIVE     Pt reports: that she cont with L cervical pain. Pt states pain today is 4/10. Pt has an avg cervical pain about 5/10. Pt states therapy has been helping decrease cervical pain.   She was compliant with home exercise program.  Response to previous treatment: None  Functional change: No change     Pain: 4/10  Location: left cervical and mid-back      OBJECTIVE     Objective Measures updated at progress report unless specified.     CMS Impairment/Limitation/Restriction for FOTO Neck Survey  Status Limitation G-Code CMS Severity Modifier  Intake 34% 66%  Predicted 57% 43% Goal Status+ CK - At least 40 percent but less than 60 percent  5/12/2022 41% 59% Current Status CK - At least 40 percent but less than 60 percent  D/C Status CK **only report if this is discharge survey  +Based on FOTO predicted change score     Cervical Range of Motion:     Degrees Pain   Flexion 30 Yes. sharp      Extension 17 Minor pain      Right Side Bending 20 deg  Yes. Sharp pain   Left Side Bending 20 deg  Yes. Sharp pain       Right Rotation Mod loss yes   Left Rotation Mod loss yes      Shoulder Range of Motion:   Shoulder Left Right   Flexion WFL slightly  pain WFL   Abduction WFL  Slightly pain WFL   ER WFL slightly pain WFL   IR WFL slightly pain WFL          Upper Extremity Strength  (R) UE   (L) UE     Shoulder elevation: 4+/5 Shoulder elevation: 4+/5   Shoulder flexion: 4-/5 Shoulder flexion: 4-/5   Shoulder Abduction: 4-/5 Shoulder abduction: 4-/5   Shoulder ER 4-/5 Shoulder ER 4-/5   Shoulder IR 4-/5 Shoulder IR 4-/5    4/5 : 4/5          TREATMENT     Nga received the treatments listed below:      received therapeutic exercises to develop strength and ROM for 39  minutes including:    UBE 6 min  Seated upper traps stretch + heat pack 3x30 sec 3a  Seated levator scap stretch  + heat pack  3x30 sec ea   Seated scap retraction 3x10  Seatedcervical rotation 20x  Seated cervical flexion 20x   Seated shoulder flexion 20x   No moneys' YTB 30x  Shoulder extension YTB 30x   Scapular retraction YTB 30x     received the following manual therapy techniques: Soft tissue Mobilization were applied to the: L cervical for 00  minutes, including:  L cervical and upper traps STM    PATIENT EDUCATION AND HOME EXERCISES     Home Exercises and Patient Education Provided     Education provided:   - Perform HEP 2 times per day     Written Home Exercises Provided: Patient instructed to cont prior HEP.  Exercises were reviewed and Nga was able to demonstrate them prior to the end of the session.  Nga demonstrated good  understanding of the education provided.      See EMR under Patient Instructions for exercises provided 4/12/2022.    ASSESSMENT     Pt tolerated PT session good. Pt was re-assessed today. Pt has been in therapy for 4 weeks. Pt demonstrated improvement in cervical AROM, but she cont with lower cervical sharp pain  Pt cont with decrease L shoulder AROM and weakness. FOTO survey demonstrated 59% limitations.  Pt has met 1/5 STGs. Overall, pt has been progressing in therapy. Pt will benefit from skilled PT services to decrease pain and return to PLOF.      Nga Is progressing well towards her goals.   Pt prognosis is Fair.     Pt will continue to benefit from skilled outpatient physical therapy to address the deficits listed in the problem list box on initial evaluation, provide pt/family education and to maximize pt's level of independence in the home and community environment.     Pt's spiritual, cultural and educational needs considered and pt agreeable to plan of care and goals.     Anticipated barriers to physical therapy: Transportation     GOALS: Short Term Goals: 4 weeks  1.Report decreased in pain at worse less than  <   / =  5  /10  to increase tolerance for functional activities. Goal not met 5-12-22  2. Pt to improve cervical range of motion  by 25% to allow for improved functional mobility to allow for improvement in IADLs.   Goal not met 5-12-22  3. Increased   B UE MMT 1/2  grade to increase tolerance for ADL and work activities. Goal not met 5-12-22  4. Pt to report able to return to daily house shores with 50% less cervical pain to improve community of life.  Goal not met 5-12-22  5. Pt to tolerate HEP to improve ROM and independence with ADL's.  Goal met 5-12-22     Long Term Goals: 8 weeks  1.Report decreased in pain at worse less than  <   / =  2  /10  to increase tolerance for functional activities. On going  2.Pt to improve cervical range of motion by 75% to allow for improved functional mobility to allow for improvement in IADLs. On going  3.Increased B UE  MMT  1 grade  to increase tolerance for ADL and work activities.On going  4.  Pt will report 43%   on FOTO neck survey score for neck pain disability to demonstrate decrease in disability and improvement in neck pain.On going  5. Pt to be Independent with HEP to improve ROM and independence with ADL's. On going    PLAN     Plan of care Certification: 4/12/2022 to 7-12-22    Edi Ramirez PT

## 2022-05-17 ENCOUNTER — CLINICAL SUPPORT (OUTPATIENT)
Dept: REHABILITATION | Facility: HOSPITAL | Age: 67
End: 2022-05-17
Attending: INTERNAL MEDICINE
Payer: MEDICARE

## 2022-05-17 DIAGNOSIS — M54.2 PAINFUL CERVICAL RANGE OF MOTION: ICD-10-CM

## 2022-05-17 DIAGNOSIS — R29.898 UPPER EXTREMITY WEAKNESS: ICD-10-CM

## 2022-05-17 DIAGNOSIS — M54.2 CERVICAL PAIN (NECK): Primary | ICD-10-CM

## 2022-05-17 PROCEDURE — 97140 MANUAL THERAPY 1/> REGIONS: CPT | Mod: PN

## 2022-05-17 PROCEDURE — 97110 THERAPEUTIC EXERCISES: CPT | Mod: PN

## 2022-05-17 NOTE — PROGRESS NOTES
[facility]   Physical Therapy Treatment Note     Name: Nga Livingston  [facility] Number: [id number]    Therapy Diagnosis:   Encounter Diagnoses   Name Primary?    Cervical pain (neck) Yes    Painful cervical range of motion     Upper extremity weakness      Physician: Elaine Mahmood MD    Visit Date: 5/17/2022     Physician Orders: PT Eval and Treat   Medical Diagnosis from Referral:   M54.2 (ICD-10-CM) - Neck pain on left side   M54.9 (ICD-10-CM) - Pain of mid back      Evaluation Date: 4/12/2022  Authorization Period Expiration: 12/31/2022  Plan of Care Expiration: 7-12-22  Visit # / Visits authorized: 3/ 25   Progress Note Due: 6-12-22  FOTO: 1/ 1     Precautions: Standard    Time In: 8:36 am  Time Out: 9:15 am  Total Billable Time: 39minutes      SUBJECTIVE     Pt reports: that she cont with L cervical pain. Pt states pain today is 4/10. Pt has an avg cervical pain about 5/10. Pt states therapy has been helping decrease cervical pain.   She was compliant with home exercise program.  Response to previous treatment: None  Functional change: No change     Pain: 4/10  Location: left cervical and mid-back      OBJECTIVE     Objective Measures updated at progress report unless specified.       TREATMENT     Nga received the treatments listed below:      received therapeutic exercises to develop strength and ROM for 35  minutes including:    UBE 4 min  Seated upper traps stretch + heat pack 3x30 sec 3a  Seated levator scap stretch  + heat pack  3x30 sec ea   Seated scap retraction 3x10  Supine shoulder flexion AAROM 3x10  Supine chin tuck   Supine cervical rotation 20x  Supine cervical flexion 20x   Seated shoulder flexion 20x   No moneys' YTB 30x  Shoulder extension YTB 30x   Scapular retraction YTB 30x     received the following manual therapy techniques: Soft tissue Mobilization were applied to the: L cervical for 10  minutes, including:  L cervical and upper traps STM    Pt received 10 min      PATIENT EDUCATION AND HOME EXERCISES     Home Exercises and Patient Education Provided     Education provided:   - Perform HEP 2 times per day     Written Home Exercises Provided: Patient instructed to cont prior HEP.  Exercises were reviewed and Nga was able to demonstrate them prior to the end of the session.  Nga demonstrated good  understanding of the education provided.      See EMR under Patient Instructions for exercises provided 4/12/2022.    ASSESSMENT     Pt tolerated PT session fair plus today. Increase posterior cervical pain and tenderness during STM. Soft tissue restriction was noted. Pt experience discomfort during supine cervical rotation and flexion. Pt cont with over-activation of B upper traps. Mod v/c's required to perform exercises with proper form and to avoid upper traps over activation. Pt will benefit from skilled PT services to decrease pain and return to PLOF.     Nga Is progressing well towards her goals.   Pt prognosis is Fair.     Pt will continue to benefit from skilled outpatient physical therapy to address the deficits listed in the problem list box on initial evaluation, provide pt/family education and to maximize pt's level of independence in the home and community environment.     Pt's spiritual, cultural and educational needs considered and pt agreeable to plan of care and goals.     Anticipated barriers to physical therapy: Transportation     GOALS: Short Term Goals: 4 weeks  1.Report decreased in pain at worse less than  <   / =  5  /10  to increase tolerance for functional activities. Goal not met 5-12-22  2. Pt to improve cervical range of motion  by 25% to allow for improved functional mobility to allow for improvement in IADLs.   Goal not met 5-12-22  3. Increased   B UE MMT 1/2  grade to increase tolerance for ADL and work activities. Goal not met 5-12-22  4. Pt to report able to return to daily house shores with 50% less cervical pain to improve community of  life.  Goal not met 5-12-22  5. Pt to tolerate HEP to improve ROM and independence with ADL's.  Goal met 5-12-22     Long Term Goals: 8 weeks  1.Report decreased in pain at worse less than  <   / =  2  /10  to increase tolerance for functional activities. On going  2.Pt to improve cervical range of motion by 75% to allow for improved functional mobility to allow for improvement in IADLs. On going  3.Increased B UE  MMT  1 grade  to increase tolerance for ADL and work activities.On going  4.  Pt will report 43%   on FOTO neck survey score for neck pain disability to demonstrate decrease in disability and improvement in neck pain.On going  5. Pt to be Independent with HEP to improve ROM and independence with ADL's. On going    PLAN     Plan of care Certification: 4/12/2022 to 7-12-22    Edi Ramirez, PT

## 2022-05-18 NOTE — PROGRESS NOTES
"[facility]   Physical Therapy Treatment Note     Name: Nga Livingston  Clinic Number: [id number]    Therapy Diagnosis:   Encounter Diagnoses   Name Primary?    Cervical pain (neck) Yes    Painful cervical range of motion     Upper extremity weakness      Physician: Elaine Mahmood MD    Visit Date: 5/19/2022     Physician Orders: PT Eval and Treat   Medical Diagnosis from Referral:   M54.2 (ICD-10-CM) - Neck pain on left side   M54.9 (ICD-10-CM) - Pain of mid back      Evaluation Date: 4/12/2022  Authorization Period Expiration: 12/31/2022  Plan of Care Expiration: 7-12-22  Visit # / Visits authorized: 4/ 25   Progress Note Due: 6-12-22  FOTO: 1/ 1     Precautions: Standard    Time In: 835AM  Time Out: 913AM  Total Billable Time: 38 minutes      SUBJECTIVE     Pt reports: She continues to have pain in left side of her neck.    She was compliant with home exercise program.  Response to previous treatment: Good but sore   Functional change: No change     Pain: 4/10  Location: left cervical and mid-back      OBJECTIVE     Objective Measures updated at progress report unless specified.       TREATMENT     Nga received the treatments listed below:  BOLD PERFORMED    received therapeutic exercises to develop strength and ROM for 38  minutes including:    UBE 5 min  Seated upper traps stretch + heat pack 3x30 sec   Seated levator scap stretch  + heat pack  3x30 sec ea   Seated scap retraction 3x10  Supine shoulder flexion AAROM 3x10  Supine chin tuck 2 x 10  Supine cervical rotation 20x  Supine cervical flexion 20x   Seated shoulder flexion 20x with wand   +seated thoracic extension 10 x 5"  No moneys' YTB 30x  Shoulder extension YTB 30x   Scapular retraction YTB 30x     received the following manual therapy techniques: Soft tissue Mobilization were applied to the: L cervical for 00 minutes, including:  L cervical and upper traps STM    Pt received 10 min     PATIENT EDUCATION AND HOME EXERCISES "     Home Exercises and Patient Education Provided     Education provided:   - Perform HEP 2 times per day      Written Home Exercises Provided: Patient instructed to cont prior HEP.  Exercises were reviewed and Nga was able to demonstrate them prior to the end of the session.  Nga demonstrated good  understanding of the education provided.      See EMR under Patient Instructions for exercises provided 4/12/2022.    ASSESSMENT   Pt tolerated session well. Pt continues to report pain in posterior cervical area. Continued previously prescribed therex focused on ROM and periscapular strength and mobility. Pt would benefit from continued skilled physical therapy in order to reach pt's goals.      Nga Is progressing well towards her goals.   Pt prognosis is Fair.     Pt will continue to benefit from skilled outpatient physical therapy to address the deficits listed in the problem list box on initial evaluation, provide pt/family education and to maximize pt's level of independence in the home and community environment.     Pt's spiritual, cultural and educational needs considered and pt agreeable to plan of care and goals.     Anticipated barriers to physical therapy: Transportation     GOALS: Short Term Goals: 4 weeks  1.Report decreased in pain at worse less than  <   / =  5  /10  to increase tolerance for functional activities. Goal not met 5-12-22  2. Pt to improve cervical range of motion  by 25% to allow for improved functional mobility to allow for improvement in IADLs.   Goal not met 5-12-22  3. Increased   B UE MMT 1/2  grade to increase tolerance for ADL and work activities. Goal not met 5-12-22  4. Pt to report able to return to daily house shores with 50% less cervical pain to improve community of life.  Goal not met 5-12-22  5. Pt to tolerate HEP to improve ROM and independence with ADL's.  Goal met 5-12-22     Long Term Goals: 8 weeks  1.Report decreased in pain at worse less than  <   / =  2  /10  to  increase tolerance for functional activities. On going  2.Pt to improve cervical range of motion by 75% to allow for improved functional mobility to allow for improvement in IADLs. On going  3.Increased B UE  MMT  1 grade  to increase tolerance for ADL and work activities.On going  4.  Pt will report 43%   on FOTO neck survey score for neck pain disability to demonstrate decrease in disability and improvement in neck pain.On going  5. Pt to be Independent with HEP to improve ROM and independence with ADL's. On going    PLAN     Plan of care Certification: 4/12/2022 to 7-12-22    Continue with established Plan of Care towards established PT goals.     Samson Agee, PTA   5/19/22

## 2022-05-19 ENCOUNTER — CLINICAL SUPPORT (OUTPATIENT)
Dept: REHABILITATION | Facility: HOSPITAL | Age: 67
End: 2022-05-19
Attending: INTERNAL MEDICINE
Payer: MEDICARE

## 2022-05-19 DIAGNOSIS — R29.898 UPPER EXTREMITY WEAKNESS: ICD-10-CM

## 2022-05-19 DIAGNOSIS — M54.2 CERVICAL PAIN (NECK): Primary | ICD-10-CM

## 2022-05-19 DIAGNOSIS — M54.2 PAINFUL CERVICAL RANGE OF MOTION: ICD-10-CM

## 2022-05-19 PROCEDURE — 97110 THERAPEUTIC EXERCISES: CPT | Mod: PN,CQ

## 2022-05-19 NOTE — PROGRESS NOTES
[facility]   Physical Therapy Treatment Note     Name: Nga Livingston  [facility] Number: [id number]    Therapy Diagnosis:   Encounter Diagnoses   Name Primary?    Cervical pain (neck) Yes    Painful cervical range of motion     Upper extremity weakness      Physician: Elaine Mahmood MD    Visit Date: 5/19/2022     Physician Orders: PT Eval and Treat   Medical Diagnosis from Referral:   M54.2 (ICD-10-CM) - Neck pain on left side   M54.9 (ICD-10-CM) - Pain of mid back      Evaluation Date: 4/12/2022  Authorization Period Expiration: 12/31/2022  Plan of Care Expiration: 7-12-22  Visit # / Visits authorized: 4/ 25   Progress Note Due: 6-12-22  FOTO: 1/ 1     Precautions: Standard    Time In: 8:35 am  Time Out: 9:15 am  Total Billable Time: 40 minutes      SUBJECTIVE     Pt reports: is feeling okay today; most discomfort in L upper trap. It was hurting more last night so she used a pain patch.     She was compliant with home exercise program.  Response to previous treatment: None  Functional change: Ongoing      Pain: 4/10  Location: left cervical and mid-back      OBJECTIVE     Objective Measures updated at progress report unless specified.       TREATMENT     Nga received the treatments listed below:      received therapeutic exercises to develop strength and ROM for 30 minutes including:    UBE 5 min    Seated upper traps stretch + heat pack 3x30 sec 3a  Seated levator scap stretch  + heat pack  3x30 sec ea   Seated scap retraction 3x10  Supine shoulder flexion AAROM 3x10  Supine chin tuck   Supine cervical rotation 20x  Supine cervical flexion 20x   Seated shoulder flexion 20x   No moneys' YTB 30x  Shoulder extension YTB 30x   Scapular retraction YTB 30x       received the following manual therapy techniques: Soft tissue Mobilization were applied to the: L cervical for 10  minutes, including:    L cervical and upper traps STM    Pt received 10 min     PATIENT EDUCATION AND HOME EXERCISES      Home Exercises and Patient Education Provided     Education provided:   - Perform HEP 2 times per day     Written Home Exercises Provided: Patient instructed to cont prior HEP.  Exercises were reviewed and Nga was able to demonstrate them prior to the end of the session.  Nga demonstrated good  understanding of the education provided.      See EMR under Patient Instructions for exercises provided 4/12/2022.    ASSESSMENT     Pt tolerated PT session fair plus today. Increase posterior cervical pain and tenderness during STM. Soft tissue restriction was noted. Pt experience discomfort during supine cervical rotation and flexion. Pt cont with over-activation of B upper traps. Mod v/c's required to perform exercises with proper form and to avoid upper traps over activation. Pt will benefit from skilled PT services to decrease pain and return to PLOF.     Nga Is progressing well towards her goals.   Pt prognosis is Fair.     Pt will continue to benefit from skilled outpatient physical therapy to address the deficits listed in the problem list box on initial evaluation, provide pt/family education and to maximize pt's level of independence in the home and community environment.     Pt's spiritual, cultural and educational needs considered and pt agreeable to plan of care and goals.     Anticipated barriers to physical therapy: Transportation     GOALS: Short Term Goals: 4 weeks  1.Report decreased in pain at worse less than  <   / =  5  /10  to increase tolerance for functional activities. Goal not met 5-12-22  2. Pt to improve cervical range of motion  by 25% to allow for improved functional mobility to allow for improvement in IADLs.   Goal not met 5-12-22  3. Increased   B UE MMT 1/2  grade to increase tolerance for ADL and work activities. Goal not met 5-12-22  4. Pt to report able to return to daily house shores with 50% less cervical pain to improve community of life.  Goal not met 5-12-22  5. Pt to  tolerate HEP to improve ROM and independence with ADL's.  Goal met 5-12-22     Long Term Goals: 8 weeks  1.Report decreased in pain at worse less than  <   / =  2  /10  to increase tolerance for functional activities. On going  2.Pt to improve cervical range of motion by 75% to allow for improved functional mobility to allow for improvement in IADLs. On going  3.Increased B UE  MMT  1 grade  to increase tolerance for ADL and work activities.On going  4.  Pt will report 43%   on FOTO neck survey score for neck pain disability to demonstrate decrease in disability and improvement in neck pain.On going  5. Pt to be Independent with HEP to improve ROM and independence with ADL's. On going    PLAN     Plan of care Certification: 4/12/2022 to 7-12-22    MARCELO TIRADO, PT

## 2022-05-26 ENCOUNTER — CLINICAL SUPPORT (OUTPATIENT)
Dept: REHABILITATION | Facility: HOSPITAL | Age: 67
End: 2022-05-26
Attending: INTERNAL MEDICINE
Payer: MEDICARE

## 2022-05-26 DIAGNOSIS — M54.2 PAINFUL CERVICAL RANGE OF MOTION: ICD-10-CM

## 2022-05-26 DIAGNOSIS — R29.898 UPPER EXTREMITY WEAKNESS: ICD-10-CM

## 2022-05-26 DIAGNOSIS — M54.2 CERVICAL PAIN (NECK): Primary | ICD-10-CM

## 2022-05-26 PROCEDURE — 97110 THERAPEUTIC EXERCISES: CPT | Mod: PN,CQ

## 2022-05-26 NOTE — PROGRESS NOTES
"[facility]   Physical Therapy Treatment Note     Name: Nga Livingston  Clinic Number: [id number]    Therapy Diagnosis:   Encounter Diagnoses   Name Primary?    Cervical pain (neck) Yes    Painful cervical range of motion     Upper extremity weakness      Physician: Elaine Mahmood MD    Visit Date: 5/26/2022     Physician Orders: PT Eval and Treat   Medical Diagnosis from Referral:   M54.2 (ICD-10-CM) - Neck pain on left side   M54.9 (ICD-10-CM) - Pain of mid back      Evaluation Date: 4/12/2022  Authorization Period Expiration: 12/31/2022  Plan of Care Expiration: 7-12-22  Visit # / Visits authorized: 4/ 25   Progress Note Due: 6-12-22  FOTO: 1/ 1     Precautions: Standard    Time In: 834AM  Time Out:936AM  Total Billable Time: 52 minutes      SUBJECTIVE     Pt reports: She continues to have pain in her left neck. The pain has gotten a little bit better.   She was compliant with home exercise program.  Response to previous treatment: Good  Functional change: No change     Pain: 5/10  Location: left cervical and mid-back      OBJECTIVE     Objective Measures updated at progress report unless specified.       TREATMENT     Nga received the treatments listed below:  BOLD PERFORMED    received therapeutic exercises to develop strength and ROM for 62 minutes including:    UBE 3/3' min  Seated upper traps stretch  3x30 sec   Seated levator scap stretch  3x30 sec ea   Seated scap retraction 3x10  Supine shoulder flexion AAROM 3x10 over 1/2 foam   +Supine pec stretch with hands behind head over 1/2 foam x 3'  Supine chin tuck 2 x 10  Supine cervical rotation 20x  Supine cervical flexion 20x   Seated shoulder flexion 20x with wand   seated thoracic extension 10 x 5"  No moneys' +RTB 30x  Shoulder extension +RTB 30x   Scapular retraction +RTB 30x     received the following manual therapy techniques: Soft tissue Mobilization were applied to the: L cervical for 10 minutes, including:  L cervical and " upper traps STM    Pt received 10 min MHP to UT and cervical spine    PATIENT EDUCATION AND HOME EXERCISES     Home Exercises and Patient Education Provided     Education provided:   - Perform HEP 2 times per day      Written Home Exercises Provided: Updated HEP with ergonomic break handout 5/26/22.  Exercises were reviewed and Nga was able to demonstrate them prior to the end of the session.  Nga demonstrated good  understanding of the education provided.      See EMR under Patient Instructions for exercises provided 4/12/2022.    ASSESSMENT   Nga tolerated session well. Continues to report pain in left UT area. Performed STM to area with pt reporting relief of symptoms. Continued periscapular strengthening and cervical and thoracic mobility without issue. Pt requires verbal cues for performance to avoid upper trap compensation and for scapular retraction. Pt provided ergonomic break handout for stretching and mobility when at work. Continue progressing pt to tolerance to address deficits.        Nga Is progressing well towards her goals.   Pt prognosis is Fair.     Pt will continue to benefit from skilled outpatient physical therapy to address the deficits listed in the problem list box on initial evaluation, provide pt/family education and to maximize pt's level of independence in the home and community environment.     Pt's spiritual, cultural and educational needs considered and pt agreeable to plan of care and goals.     Anticipated barriers to physical therapy: Transportation     GOALS: Short Term Goals: 4 weeks  1.Report decreased in pain at worse less than  <   / =  5  /10  to increase tolerance for functional activities. Goal not met 5-12-22  2. Pt to improve cervical range of motion  by 25% to allow for improved functional mobility to allow for improvement in IADLs.   Goal not met 5-12-22  3. Increased   B UE MMT 1/2  grade to increase tolerance for ADL and work activities. Goal not met  5-12-22  4. Pt to report able to return to daily house shores with 50% less cervical pain to improve community of life.  Goal not met 5-12-22  5. Pt to tolerate HEP to improve ROM and independence with ADL's.  Goal met 5-12-22     Long Term Goals: 8 weeks  1.Report decreased in pain at worse less than  <   / =  2  /10  to increase tolerance for functional activities. On going  2.Pt to improve cervical range of motion by 75% to allow for improved functional mobility to allow for improvement in IADLs. On going  3.Increased B UE  MMT  1 grade  to increase tolerance for ADL and work activities.On going  4.  Pt will report 43%   on FOTO neck survey score for neck pain disability to demonstrate decrease in disability and improvement in neck pain.On going  5. Pt to be Independent with HEP to improve ROM and independence with ADL's. On going    PLAN     Plan of care Certification: 4/12/2022 to 7-12-22    Continue with established Plan of Care towards established PT goals.     Samson Agee, PTA   05/26/2022

## 2022-06-16 NOTE — PROGRESS NOTES
[facility]   Physical Therapy Treatment Note     Name: Nga Livingston  [facility] Number: [id number]    Therapy Diagnosis:   Encounter Diagnoses   Name Primary?    Cervical pain (neck) Yes    Painful cervical range of motion     Upper extremity weakness      Physician: Elaine Mahmood MD    Visit Date: 6/17/2022     Physician Orders: PT Eval and Treat   Medical Diagnosis from Referral:   M54.2 (ICD-10-CM) - Neck pain on left side   M54.9 (ICD-10-CM) - Pain of mid back      Evaluation Date: 4/12/2022  Authorization Period Expiration: 12/31/2022  Plan of Care Expiration: 7-12-22  Visit # / Visits authorized: 7/ 25   Progress Note Due: 7-17-22   FOTO: 1/ 1     Precautions: Standard    Time In: 9:15 am  Time Out 10:0 am  Total Billable Time: 45 minutes      SUBJECTIVE     Pt reports: that she has some days that her cervical pain is good, but other days that hurts more   She was compliant with home exercise program.  Response to previous treatment: Good  Functional change: No change     Pain: 5/10  Location: left cervical and mid-back      OBJECTIVE     Objective Measures updated at progress report unless specified.     CMS Impairment/Limitation/Restriction for FOTO Neck Survey  Status Limitation G-Code CMS Severity Modifier  Intake 34% 66%  Predicted 57% 43% Goal Status+ CK - At least 40 percent but less than 60 percent  5/12/2022 41% 59%  6/17/2022 53% 47% Current Status CK - At least 40 percent but less than 60 percent  D/C Status CK **only report if this is discharge survey  +Based on FOTO predicted change score    Cervical Range of Motion:     Degrees Pain   Flexion 34 Yes. L upper traps sharp      Extension 32  Yes. L upper traps sharp      Right Side Bending 35 deg  Yes. Sharp pain   Left Side Bending 25 deg  Yes. Sharp pain       Right Rotation Mod loss yes   Left Rotation Mod loss yes         Upper Extremity Strength  (R) UE   (L) UE     Shoulder elevation: 4+/5 Shoulder elevation: 4+/5  "  Shoulder flexion: 4/5 Shoulder flexion: 4/5   Shoulder Abduction: 4/5 Shoulder abduction: 4/5   Shoulder ER 4/5 Shoulder ER 4/5   Shoulder IR 4/5 Shoulder IR 4/5    4/5 : 4/5          TREATMENT     Nga received the treatments listed below:  BOLD PERFORMED    received therapeutic exercises to develop strength and ROM for 35 minutes including:    UBE 3/3' min  Seated upper traps stretch  3x30 sec   Seated levator scap stretch  3x30 sec ea   Seated scap retraction 3x10  Supine shoulder flexion AAROM 3x10 over 1/2 foam   +Supine pec stretch with hands behind head over 1/2 foam x 3'  Supine chin tuck 2 x 10  Supine cervical rotation 20x  Supine cervical flexion 20x   Seated shoulder flexion 20x with wand   seated thoracic extension 10 x 5"  No moneys' +RTB 30x  Shoulder extension +RTB 30x   Scapular retraction +RTB 30x     received the following manual therapy techniques: Soft tissue Mobilization were applied to the: L cervical for 10 minutes, including:  L cervical and upper traps STM  Vacuum/cupping STM with manual therapy techniques was performed to L upper traps  to decrease muscle tightness, increase circulation and promote healing process. The pt's skin was monitored for redness adjusting pressure as needed. The pt was instructed in possible side effects of bruising and/or soreness.       Pt received 10 min MHP to UT and cervical spine    PATIENT EDUCATION AND HOME EXERCISES     Home Exercises and Patient Education Provided     Education provided:   - Perform HEP 2 times per day      Written Home Exercises Provided: Updated HEP with ergonomic break handout 5/26/22.  Exercises were reviewed and Nga was able to demonstrate them prior to the end of the session.  Nga demonstrated good  understanding of the education provided.      See EMR under Patient Instructions for exercises provided 4/12/2022.    ASSESSMENT     Nga tolerated session well. Pt was re-assessed today. Pt demonstrated improvmeent of " cervical AROM and B UE msucles strength, but pt cont with weakness of postural muscles. During palpation, pt demonstrated severe L upper traps tightness. FOTO survey demonstrated 47% limitations. Pt has met 1/5 LTGs. Overall, pt has been responding good to therapy with decrease  Cervical pain, improve range of motion, and improve B UE muscles strength. Continue progressing pt to tolerance to address deficits.    Nga Is progressing well towards her goals.   Pt prognosis is Fair.     Pt will continue to benefit from skilled outpatient physical therapy to address the deficits listed in the problem list box on initial evaluation, provide pt/family education and to maximize pt's level of independence in the home and community environment.     Pt's spiritual, cultural and educational needs considered and pt agreeable to plan of care and goals.     Anticipated barriers to physical therapy: Transportation     GOALS: Short Term Goals: 4 weeks  1.Report decreased in pain at worse less than  <   / =  5  /10  to increase tolerance for functional activities. Goal not met 5-12-22  2. Pt to improve cervical range of motion  by 25% to allow for improved functional mobility to allow for improvement in IADLs.   Goal not met 5-12-22  3. Increased   B UE MMT 1/2  grade to increase tolerance for ADL and work activities. Goal not met 5-12-22  4. Pt to report able to return to daily house shores with 50% less cervical pain to improve community of life.  Goal not met 5-12-22  5. Pt to tolerate HEP to improve ROM and independence with ADL's.  Goal met 5-12-22     Long Term Goals: 8 weeks  1.Report decreased in pain at worse less than  <   / =  2  /10  to increase tolerance for functional activities. Goal not met 6-17-22  2.Pt to improve cervical range of motion by 75% to allow for improved functional mobility to allow for improvement in IADLs. Goal not met 6-17-22  3.Increased B UE  MMT  1 grade  to increase tolerance for ADL and work  activities. Goal not met 6-17-22  4.  Pt will report 43%  on FOTO neck survey score for neck pain disability to demonstrate decrease in disability and improvement in neck pain. Goal not met 6-17-22  5. Pt to be Independent with HEP to improve ROM and independence with ADL's. Goal met 6-17-22    PLAN     Plan of care Certification: 4/12/2022 to 7-12-22    Continue with established Plan of Care towards established PT goals.     Edi Ramirez, PT   06/17/2022

## 2022-06-17 ENCOUNTER — CLINICAL SUPPORT (OUTPATIENT)
Dept: REHABILITATION | Facility: HOSPITAL | Age: 67
End: 2022-06-17
Attending: INTERNAL MEDICINE
Payer: MEDICARE

## 2022-06-17 DIAGNOSIS — M54.2 CERVICAL PAIN (NECK): Primary | ICD-10-CM

## 2022-06-17 DIAGNOSIS — R29.898 UPPER EXTREMITY WEAKNESS: ICD-10-CM

## 2022-06-17 DIAGNOSIS — M54.2 PAINFUL CERVICAL RANGE OF MOTION: ICD-10-CM

## 2022-06-17 PROCEDURE — 97140 MANUAL THERAPY 1/> REGIONS: CPT | Mod: PN

## 2022-06-17 PROCEDURE — 97110 THERAPEUTIC EXERCISES: CPT | Mod: PN

## 2022-06-21 ENCOUNTER — CLINICAL SUPPORT (OUTPATIENT)
Dept: REHABILITATION | Facility: HOSPITAL | Age: 67
End: 2022-06-21
Attending: INTERNAL MEDICINE
Payer: MEDICARE

## 2022-06-21 DIAGNOSIS — M54.2 CERVICAL PAIN (NECK): Primary | ICD-10-CM

## 2022-06-21 DIAGNOSIS — R29.898 UPPER EXTREMITY WEAKNESS: ICD-10-CM

## 2022-06-21 DIAGNOSIS — M54.2 PAINFUL CERVICAL RANGE OF MOTION: ICD-10-CM

## 2022-06-21 PROCEDURE — 97110 THERAPEUTIC EXERCISES: CPT | Mod: PN

## 2022-06-21 NOTE — PROGRESS NOTES
"[facility] OUTPATIENT THERAPY AND WELL[facility]   Physical Therapy Treatment Note     Name: Nga Livingston  Clinic Number: [id number]    Therapy Diagnosis:   Encounter Diagnoses   Name Primary?    Cervical pain (neck) Yes    Painful cervical range of motion     Upper extremity weakness      Physician: Elaine Mahmood MD    Visit Date: 6/21/2022     Physician Orders: PT Eval and Treat   Medical Diagnosis from Referral:   M54.2 (ICD-10-CM) - Neck pain on left side   M54.9 (ICD-10-CM) - Pain of mid back      Evaluation Date: 4/12/2022  Authorization Period Expiration: 12/31/2022  Plan of Care Expiration: 7-12-22  Visit # / Visits authorized: 8/ 25   Progress Note Due: 7-17-22   FOTO: 1/ 1     Precautions: Standard    Time In: 9:15 am  Time Out 10:0 am  Total Billable Time: 45 minutes      SUBJECTIVE     Pt reports: that cupping technique did not help decrease pain. Pt cont with L upper traps pain.   She was compliant with home exercise program.  Response to previous treatment: Good  Functional change: No change     Pain: 5/10  Location: left cervical and mid-back      OBJECTIVE     Objective Measures updated at progress report unless specified.       TREATMENT     Nga received the treatments listed below:  BOLD PERFORMED    received therapeutic exercises to develop strength and ROM for 45 minutes including:    UBE 3/3' min  Seated upper traps stretch + heat pack  3x30 sec   Seated levator scap stretch + heat pack 3x30 sec ea   Supine shoulder flexion AAROM 3x10 over 1/2 foam   +Supine pec stretch with hands behind head over 1/2 foam x 3'  Supine chin tuck 2 x 10  Supine cervical rotation 20x  Supine cervical flexion 20x   Seated shoulder flexion 20x with wand   seated thoracic extension 10 x 5"  No moneys at wall with half foam  +RTB 30x  Shoulder horizontal abd YTB with half foam  Shoulder extension +RTB 30x   Scapular retraction +RTB 30x   Wall angels 30x    received the following manual therapy techniques: Soft tissue " Mobilization were applied to the: L cervical for 00 minutes, including:  L cervical and upper traps STM  Vacuum/cupping STM with manual therapy techniques was performed to L upper traps  to decrease muscle tightness, increase circulation and promote healing process. The pt's skin was monitored for redness adjusting pressure as needed. The pt was instructed in possible side effects of bruising and/or soreness.       Pt received 10 min MHP to UT and cervical spine    PATIENT EDUCATION AND HOME EXERCISES     Home Exercises and Patient Education Provided     Education provided:   - Perform HEP 2 times per day      Written Home Exercises Provided: Updated HEP with ergonomic break handout 5/26/22.  Exercises were reviewed and Nga was able to demonstrate them prior to the end of the session.  Nga demonstrated good  understanding of the education provided.      See EMR under Patient Instructions for exercises provided 4/12/2022.    ASSESSMENT     Nga tolerated session well. Progression of posture exercises to decrease cervical pain. Pt demonstrated seated and standing forwards head and slouched position. Pt required mod v/c's to perform exercises with proper form and proper muscles activation. Pt will cont benefit from postural exercises to decrease L cervical and upper traps pain.  Continue progressing pt to tolerance to address deficits.    Nga Is progressing well towards her goals.   Pt prognosis is Fair.     Pt will continue to benefit from skilled outpatient physical therapy to address the deficits listed in the problem list box on initial evaluation, provide pt/family education and to maximize pt's level of independence in the home and community environment.     Pt's spiritual, cultural and educational needs considered and pt agreeable to plan of care and goals.     Anticipated barriers to physical therapy: Transportation     GOALS: Short Term Goals: 4 weeks  1.Report decreased in pain at worse less than  <    / =  5  /10  to increase tolerance for functional activities. Goal not met 5-12-22  2. Pt to improve cervical range of motion  by 25% to allow for improved functional mobility to allow for improvement in IADLs.   Goal not met 5-12-22  3. Increased   B UE MMT 1/2  grade to increase tolerance for ADL and work activities. Goal not met 5-12-22  4. Pt to report able to return to daily house shores with 50% less cervical pain to improve community of life.  Goal not met 5-12-22  5. Pt to tolerate HEP to improve ROM and independence with ADL's.  Goal met 5-12-22     Long Term Goals: 8 weeks  1.Report decreased in pain at worse less than  <   / =  2  /10  to increase tolerance for functional activities. Goal not met 6-17-22  2.Pt to improve cervical range of motion by 75% to allow for improved functional mobility to allow for improvement in IADLs. Goal not met 6-17-22  3.Increased B UE  MMT  1 grade  to increase tolerance for ADL and work activities. Goal not met 6-17-22  4.  Pt will report 43%  on FOTO neck survey score for neck pain disability to demonstrate decrease in disability and improvement in neck pain. Goal not met 6-17-22  5. Pt to be Independent with HEP to improve ROM and independence with ADL's. Goal met 6-17-22    PLAN     Plan of care Certification: 4/12/2022 to 7-12-22    Continue with established Plan of Care towards established PT goals.     Edi Ramirez, PT   06/21/2022

## 2022-06-21 NOTE — PROGRESS NOTES
[facility]   Physical Therapy Treatment Note     Name: Nga Livingston  [facility] Number: [id number]    Therapy Diagnosis:   No diagnosis found.  Physician: Elaine Mahmood MD    Visit Date: 6/21/2022     Physician Orders: PT Eval and Treat   Medical Diagnosis from Referral:   M54.2 (ICD-10-CM) - Neck pain on left side   M54.9 (ICD-10-CM) - Pain of mid back      Evaluation Date: 4/12/2022  Authorization Period Expiration: 12/31/2022  Plan of Care Expiration: 7-12-22  Visit # / Visits authorized: 7/ 25   Progress Note Due: 7-17-22   FOTO: 1/ 1     Precautions: Standard    Time In: 9:15 am  Time Out 10:0 am  Total Billable Time: 45 minutes      SUBJECTIVE     Pt reports: that she has some days that her cervical pain is good, but other days that hurts more   She was compliant with home exercise program.  Response to previous treatment: Good  Functional change: No change     Pain: 5/10  Location: left cervical and mid-back      OBJECTIVE     Objective Measures updated at progress report unless specified.     CMS Impairment/Limitation/Restriction for FOTO Neck Survey  Status Limitation G-Code CMS Severity Modifier  Intake 34% 66%  Predicted 57% 43% Goal Status+ CK - At least 40 percent but less than 60 percent  5/12/2022 41% 59%  6/17/2022 53% 47% Current Status CK - At least 40 percent but less than 60 percent  D/C Status CK **only report if this is discharge survey  +Based on FOTO predicted change score    Cervical Range of Motion:     Degrees Pain   Flexion 34 Yes. L upper traps sharp      Extension 32  Yes. L upper traps sharp      Right Side Bending 35 deg  Yes. Sharp pain   Left Side Bending 25 deg  Yes. Sharp pain       Right Rotation Mod loss yes   Left Rotation Mod loss yes         Upper Extremity Strength  (R) UE   (L) UE     Shoulder elevation: 4+/5 Shoulder elevation: 4+/5   Shoulder flexion: 4/5 Shoulder flexion: 4/5   Shoulder Abduction: 4/5 Shoulder abduction: 4/5   Shoulder ER 4/5  "Shoulder ER 4/5   Shoulder IR 4/5 Shoulder IR 4/5    4/5 : 4/5          TREATMENT     Nga received the treatments listed below:  BOLD PERFORMED    received therapeutic exercises to develop strength and ROM for 35 minutes including:    UBE 3/3' min  Seated upper traps stretch  3x30 sec   Seated levator scap stretch  3x30 sec ea   Seated scap retraction 3x10  Supine shoulder flexion AAROM 3x10 over 1/2 foam   +Supine pec stretch with hands behind head over 1/2 foam x 3'  Supine chin tuck 2 x 10  Supine cervical rotation 20x  Supine cervical flexion 20x   Seated shoulder flexion 20x with wand   seated thoracic extension 10 x 5"  No moneys' +RTB 30x  Shoulder extension +RTB 30x   Scapular retraction +RTB 30x     received the following manual therapy techniques: Soft tissue Mobilization were applied to the: L cervical for 10 minutes, including:  L cervical and upper traps STM  Vacuum/cupping STM with manual therapy techniques was performed to L upper traps  to decrease muscle tightness, increase circulation and promote healing process. The pt's skin was monitored for redness adjusting pressure as needed. The pt was instructed in possible side effects of bruising and/or soreness.       Pt received 10 min MHP to UT and cervical spine    PATIENT EDUCATION AND HOME EXERCISES     Home Exercises and Patient Education Provided     Education provided:   - Perform HEP 2 times per day      Written Home Exercises Provided: Updated HEP with ergonomic break handout 5/26/22.  Exercises were reviewed and Nga was able to demonstrate them prior to the end of the session.  Nga demonstrated good  understanding of the education provided.      See EMR under Patient Instructions for exercises provided 4/12/2022.    ASSESSMENT     Nga tolerated session well. Pt was re-assessed today. Pt demonstrated improvmeent of cervical AROM and B UE msucles strength, but pt cont with weakness of postural muscles. During palpation, pt " demonstrated severe L upper traps tightness. FOTO survey demonstrated 47% limitations. Pt has met 1/5 LTGs. Overall, pt has been responding good to therapy with decrease  Cervical pain, improve range of motion, and improve B UE muscles strength. Continue progressing pt to tolerance to address deficits.    Nga Is progressing well towards her goals.   Pt prognosis is Fair.     Pt will continue to benefit from skilled outpatient physical therapy to address the deficits listed in the problem list box on initial evaluation, provide pt/family education and to maximize pt's level of independence in the home and community environment.     Pt's spiritual, cultural and educational needs considered and pt agreeable to plan of care and goals.     Anticipated barriers to physical therapy: Transportation     GOALS: Short Term Goals: 4 weeks  1.Report decreased in pain at worse less than  <   / =  5  /10  to increase tolerance for functional activities. Goal not met 5-12-22  2. Pt to improve cervical range of motion  by 25% to allow for improved functional mobility to allow for improvement in IADLs.   Goal not met 5-12-22  3. Increased   B UE MMT 1/2  grade to increase tolerance for ADL and work activities. Goal not met 5-12-22  4. Pt to report able to return to daily house shores with 50% less cervical pain to improve community of life.  Goal not met 5-12-22  5. Pt to tolerate HEP to improve ROM and independence with ADL's.  Goal met 5-12-22     Long Term Goals: 8 weeks  1.Report decreased in pain at worse less than  <   / =  2  /10  to increase tolerance for functional activities. Goal not met 6-17-22  2.Pt to improve cervical range of motion by 75% to allow for improved functional mobility to allow for improvement in IADLs. Goal not met 6-17-22  3.Increased B UE  MMT  1 grade  to increase tolerance for ADL and work activities. Goal not met 6-17-22  4.  Pt will report 43%  on FOTO neck survey score for neck pain disability  to demonstrate decrease in disability and improvement in neck pain. Goal not met 6-17-22  5. Pt to be Independent with HEP to improve ROM and independence with ADL's. Goal met 6-17-22    PLAN     Plan of care Certification: 4/12/2022 to 7-12-22    Continue with established Plan of Care towards established PT goals.     Edi Ramirez, PT   06/21/2022

## 2022-07-11 ENCOUNTER — TELEPHONE (OUTPATIENT)
Dept: PRIMARY CARE CLINIC | Facility: CLINIC | Age: 67
End: 2022-07-11
Payer: MEDICARE

## 2022-07-11 ENCOUNTER — OFFICE VISIT (OUTPATIENT)
Dept: INTERNAL MEDICINE | Facility: CLINIC | Age: 67
End: 2022-07-11
Payer: MEDICARE

## 2022-07-11 DIAGNOSIS — J06.9 UPPER RESPIRATORY TRACT INFECTION, UNSPECIFIED TYPE: Primary | ICD-10-CM

## 2022-07-11 PROCEDURE — 1159F MED LIST DOCD IN RCRD: CPT | Mod: CPTII,S$GLB,, | Performed by: INTERNAL MEDICINE

## 2022-07-11 PROCEDURE — 99999 PR PBB SHADOW E&M-EST. PATIENT-LVL IV: ICD-10-PCS | Mod: PBBFAC,,, | Performed by: INTERNAL MEDICINE

## 2022-07-11 PROCEDURE — 3078F PR MOST RECENT DIASTOLIC BLOOD PRESSURE < 80 MM HG: ICD-10-PCS | Mod: CPTII,S$GLB,, | Performed by: INTERNAL MEDICINE

## 2022-07-11 PROCEDURE — 3008F PR BODY MASS INDEX (BMI) DOCUMENTED: ICD-10-PCS | Mod: CPTII,S$GLB,, | Performed by: INTERNAL MEDICINE

## 2022-07-11 PROCEDURE — 4010F PR ACE/ARB THEARPY RXD/TAKEN: ICD-10-PCS | Mod: CPTII,S$GLB,, | Performed by: INTERNAL MEDICINE

## 2022-07-11 PROCEDURE — 1125F PR PAIN SEVERITY QUANTIFIED, PAIN PRESENT: ICD-10-PCS | Mod: CPTII,S$GLB,, | Performed by: INTERNAL MEDICINE

## 2022-07-11 PROCEDURE — 3075F SYST BP GE 130 - 139MM HG: CPT | Mod: CPTII,S$GLB,, | Performed by: INTERNAL MEDICINE

## 2022-07-11 PROCEDURE — 1125F AMNT PAIN NOTED PAIN PRSNT: CPT | Mod: CPTII,S$GLB,, | Performed by: INTERNAL MEDICINE

## 2022-07-11 PROCEDURE — 99213 PR OFFICE/OUTPT VISIT, EST, LEVL III, 20-29 MIN: ICD-10-PCS | Mod: S$GLB,,, | Performed by: INTERNAL MEDICINE

## 2022-07-11 PROCEDURE — 3008F BODY MASS INDEX DOCD: CPT | Mod: CPTII,S$GLB,, | Performed by: INTERNAL MEDICINE

## 2022-07-11 PROCEDURE — 3075F PR MOST RECENT SYSTOLIC BLOOD PRESS GE 130-139MM HG: ICD-10-PCS | Mod: CPTII,S$GLB,, | Performed by: INTERNAL MEDICINE

## 2022-07-11 PROCEDURE — 1101F PR PT FALLS ASSESS DOC 0-1 FALLS W/OUT INJ PAST YR: ICD-10-PCS | Mod: CPTII,S$GLB,, | Performed by: INTERNAL MEDICINE

## 2022-07-11 PROCEDURE — 1101F PT FALLS ASSESS-DOCD LE1/YR: CPT | Mod: CPTII,S$GLB,, | Performed by: INTERNAL MEDICINE

## 2022-07-11 PROCEDURE — 3288F FALL RISK ASSESSMENT DOCD: CPT | Mod: CPTII,S$GLB,, | Performed by: INTERNAL MEDICINE

## 2022-07-11 PROCEDURE — 3078F DIAST BP <80 MM HG: CPT | Mod: CPTII,S$GLB,, | Performed by: INTERNAL MEDICINE

## 2022-07-11 PROCEDURE — 3288F PR FALLS RISK ASSESSMENT DOCUMENTED: ICD-10-PCS | Mod: CPTII,S$GLB,, | Performed by: INTERNAL MEDICINE

## 2022-07-11 PROCEDURE — 99999 PR PBB SHADOW E&M-EST. PATIENT-LVL IV: CPT | Mod: PBBFAC,,, | Performed by: INTERNAL MEDICINE

## 2022-07-11 PROCEDURE — 99213 OFFICE O/P EST LOW 20 MIN: CPT | Mod: S$GLB,,, | Performed by: INTERNAL MEDICINE

## 2022-07-11 PROCEDURE — 1159F PR MEDICATION LIST DOCUMENTED IN MEDICAL RECORD: ICD-10-PCS | Mod: CPTII,S$GLB,, | Performed by: INTERNAL MEDICINE

## 2022-07-11 PROCEDURE — 4010F ACE/ARB THERAPY RXD/TAKEN: CPT | Mod: CPTII,S$GLB,, | Performed by: INTERNAL MEDICINE

## 2022-07-11 RX ORDER — AMOXICILLIN 875 MG/1
875 TABLET, FILM COATED ORAL 2 TIMES DAILY
Qty: 14 TABLET | Refills: 0 | Status: SHIPPED | OUTPATIENT
Start: 2022-07-11 | End: 2022-07-20

## 2022-07-11 NOTE — TELEPHONE ENCOUNTER
Spoke with patient and she has been having some ear pain for about two weeks, hoarseness of her throat but it is not sore. Pt has been scheduled for eval at [facility] today.

## 2022-07-11 NOTE — TELEPHONE ENCOUNTER
----- Message from Ariana Marinelli sent at 7/11/2022  1:23 PM CDT -----  Contact: [phone]  Caller is requesting an earlier appointment then we can schedule.  Caller is requesting a message be sent to the provider.  If this is for urgent care symptoms, did you offer other providers at this location, providers at other locations, or [facility]? (yes, no, n/a):  n/a  If this is for the patients physical, did you offer to schedule next available and put on wait list, or to see NP or PA for their physical?  (yes, no, n/a):  n/a  When is the next available appointment with their provider:  07/20/22   Reason for the appointment:  hoarseness, ear infection for the past 2 weeks  Patient preference of timeframe to be scheduled:   ASAP  Would the patient like a call back, or a response through their [facility] portal?:   phone  Comments:  patient stated that she is getting worse. Thank you

## 2022-07-16 VITALS
TEMPERATURE: 99 F | DIASTOLIC BLOOD PRESSURE: 70 MMHG | HEART RATE: 77 BPM | HEIGHT: 63 IN | BODY MASS INDEX: 28.75 KG/M2 | SYSTOLIC BLOOD PRESSURE: 132 MMHG | WEIGHT: 162.25 LBS | OXYGEN SATURATION: 96 %

## 2022-07-16 NOTE — PROGRESS NOTES
Subjective:       Patient ID: Nga Livingston is a 66 y.o. female.    Chief Complaint: Otalgia    HPI  She complains of right ear discomfort and sore throat.  No fever, chills, night sweats  Review of Systems   Constitutional: Negative for chills, fatigue, fever and unexpected weight change.   Respiratory: Negative for chest tightness and shortness of breath.    Cardiovascular: Negative for chest pain and palpitations.   Gastrointestinal: Negative for abdominal pain and blood in stool.   Neurological: Negative for dizziness, syncope, numbness and headaches.       Objective:      Physical Exam  HENT:      Right Ear: External ear normal.      Left Ear: External ear normal.      Nose: Nose normal.      Mouth/Throat:      Mouth: Mucous membranes are moist.      Pharynx: Oropharynx is clear.   Eyes:      Pupils: Pupils are equal, round, and reactive to light.   Cardiovascular:      Rate and Rhythm: Normal rate and regular rhythm.      Heart sounds: No murmur heard.  Pulmonary:      Breath sounds: Normal breath sounds.   Chest:   Breasts:      Right: No axillary adenopathy.      Left: No axillary adenopathy.       Abdominal:      General: There is no distension.      Palpations: There is no hepatomegaly or splenomegaly.      Tenderness: There is no abdominal tenderness.   Musculoskeletal:      Cervical back: Normal range of motion.   Lymphadenopathy:      Cervical: No cervical adenopathy.      Upper Body:      Right upper body: No axillary adenopathy.      Left upper body: No axillary adenopathy.   Neurological:      Cranial Nerves: No cranial nerve deficit.      Sensory: No sensory deficit.      Motor: Motor function is intact.      Deep Tendon Reflexes: Reflexes are normal and symmetric.         Assessment/Plan       Assessment and plan:  Upper respiratory infection:  Amoxil 875 mg p.o. b.i.d. x7 days.  Call if no relief.  She was here for urgent care only appointment.  She will follow-up with her primary care  physician for a physical

## 2022-07-18 ENCOUNTER — CLINICAL SUPPORT (OUTPATIENT)
Dept: REHABILITATION | Facility: HOSPITAL | Age: 67
End: 2022-07-18
Attending: INTERNAL MEDICINE
Payer: MEDICARE

## 2022-07-18 DIAGNOSIS — M54.2 PAINFUL CERVICAL RANGE OF MOTION: ICD-10-CM

## 2022-07-18 DIAGNOSIS — M54.2 CERVICAL PAIN (NECK): Primary | ICD-10-CM

## 2022-07-18 DIAGNOSIS — R29.898 UPPER EXTREMITY WEAKNESS: ICD-10-CM

## 2022-07-18 PROCEDURE — 97110 THERAPEUTIC EXERCISES: CPT | Mod: PN

## 2022-07-18 NOTE — PROGRESS NOTES
[facility]   Physical Therapy Treatment Note     Name: Nga Livingston  [facility] Number: [id number]    Therapy Diagnosis:   Encounter Diagnoses   Name Primary?    Cervical pain (neck) Yes    Painful cervical range of motion     Upper extremity weakness      Physician: Elaine Mahmood MD    Visit Date: 7/18/2022     Physician Orders: PT Eval and Treat   Medical Diagnosis from Referral:   M54.2 (ICD-10-CM) - Neck pain on left side   M54.9 (ICD-10-CM) - Pain of mid back      Evaluation Date: 4/12/2022  Authorization Period Expiration: 12/31/2022  Plan of Care Expiration: 7-12-22  Visit # / Visits authorized: 9/ 25   Progress Note Due: 8-18-22   FOTO: 1/ 1     Precautions: Standard    Time In: 3:10 pm  Time Out 4:05 pm  Total Billable Time: 55 minutes      SUBJECTIVE     Pt reports: that she has minor L cervical pain. She has good days and bad days with her upper traps pain. Pt states she is ready to be d/c   She was compliant with home exercise program.  Response to previous treatment: Good  Functional change: No change     Pain: 2/10  Location: left cervical and mid-back      OBJECTIVE     Objective Measures updated at progress report unless specified.      CMS Impairment/Limitation/Restriction for FOTO Neck Survey  Status Limitation G-Code CMS Severity Modifier  Intake 34% 66%  Predicted 57% 43% Goal Status+ CK - At least 40 percent but less than 60 percent  5/12/2022 41% 59%  6/17/2022 53% 47% Current Status CK - At least 40 percent but less than 60 percent  D/C Status CK **only report if this is discharge survey  +Based on FOTO predicted change score    Cervical Range of Motion:     Degrees Pain   Flexion 40 No      Extension 39 No     Right Side Bending 35 deg  Yes. minor   Left Side Bending 35deg  No    Right Rotation No loss No   Left Rotation No loss No         Upper Extremity Strength  (R) UE   (L) UE     Shoulder elevation: 4+/5 Shoulder elevation: 4+/5   Shoulder flexion: 4+/5 Shoulder  "flexion: 4+/5   Shoulder Abduction: 4+/5 Shoulder abduction: 4+/5   Shoulder ER 4+/5 Shoulder ER 4+/5   Shoulder IR 4+/5 Shoulder IR 4+/5    4+/5 : 4+/5          TREATMENT     Nga received the treatments listed below:  BOLD PERFORMED    received therapeutic exercises to develop strength and ROM for 55 minutes including:    UBE 3/3' min  Seated upper traps stretch + heat pack  3x30 sec   Seated levator scap stretch + heat pack 3x30 sec ea   Supine shoulder flexion AAROM 3x10 over 1/2 foam   +Supine pec stretch with hands behind head over 1/2 foam x 3'  Supine chin tuck 2 x 10  Supine cervical rotation 20x  Supine cervical flexion 20x   Seated shoulder flexion 20x with wand   seated thoracic extension 10 x 5"  No moneys at wall with half foam  +RTB 30x  Shoulder horizontal abd YTB with half foam  Shoulder extension +RTB 30x   Scapular retraction +RTB 30x   Wall angels 30x    received the following manual therapy techniques: Soft tissue Mobilization were applied to the: L cervical for 00 minutes, including:  L cervical and upper traps STM  Vacuum/cupping STM with manual therapy techniques was performed to L upper traps  to decrease muscle tightness, increase circulation and promote healing process. The pt's skin was monitored for redness adjusting pressure as needed. The pt was instructed in possible side effects of bruising and/or soreness.       Pt received 10 min MHP to UT and cervical spine    PATIENT EDUCATION AND HOME EXERCISES     Home Exercises and Patient Education Provided     Education provided:   - Perform HEP 2 times per day      Written Home Exercises Provided: Updated HEP with ergonomic break handout 5/26/22.  Exercises were reviewed and Nga was able to demonstrate them prior to the end of the session.  Nga demonstrated good  understanding of the education provided.      See EMR under Patient Instructions for exercises provided 4/12/2022.    ASSESSMENT     Nga tolerated session well. Pt " has been re-assessed today. Pt has been in therapy for 3 months. Pt has improved cervical AROM in all planes, only minor cervical pain during side bending to R. Pt has improved B UE muscles strength to 4+/5 in all  Planes. Pt has bee independent with therex. Pt has met 4/5 STGs. Overall, pt improved well in therapy.     Nga Is progressing well towards her goals.   Pt prognosis is Fair.     Pt will continue to benefit from skilled outpatient physical therapy to address the deficits listed in the problem list box on initial evaluation, provide pt/family education and to maximize pt's level of independence in the home and community environment.     Pt's spiritual, cultural and educational needs considered and pt agreeable to plan of care and goals.     Anticipated barriers to physical therapy: Transportation     GOALS: Short Term Goals: 4 weeks  1.Report decreased in pain at worse less than  <   / =  5  /10  to increase tolerance for functional activities. Goal not met 5-12-22  2. Pt to improve cervical range of motion  by 25% to allow for improved functional mobility to allow for improvement in IADLs.   Goal not met 5-12-22  3. Increased   B UE MMT 1/2  grade to increase tolerance for ADL and work activities. Goal not met 5-12-22  4. Pt to report able to return to daily house shores with 50% less cervical pain to improve community of life.  Goal not met 5-12-22  5. Pt to tolerate HEP to improve ROM and independence with ADL's.  Goal met 5-12-22     Long Term Goals: 8 weeks  1.Report decreased in pain at worse less than  <   / =  2  /10  to increase tolerance for functional activities. Goal  met 7-18-22  2.Pt to improve cervical range of motion by 75% to allow for improved functional mobility to allow for improvement in IADLs. Goal not met 7-18-22  3.Increased B UE  MMT  1 grade  to increase tolerance for ADL and work activities. Goal met 7-18-22  4.  Pt will report 43%  on FOTO neck survey score for neck pain  disability to demonstrate decrease in disability and improvement in neck pain. Goal met 7-18-22  5. Pt to be Independent with HEP to improve ROM and independence with ADL's. Goal met 6-17-22    PLAN     Plan to be d/c today.     Edi Ramirez, PT   07/18/2022

## 2022-07-19 ENCOUNTER — PATIENT MESSAGE (OUTPATIENT)
Dept: RESEARCH | Facility: CLINIC | Age: 67
End: 2022-07-19
Payer: MEDICARE

## 2022-07-20 ENCOUNTER — LAB VISIT (OUTPATIENT)
Dept: LAB | Facility: HOSPITAL | Age: 67
End: 2022-07-20
Attending: INTERNAL MEDICINE
Payer: MEDICARE

## 2022-07-20 ENCOUNTER — OFFICE VISIT (OUTPATIENT)
Dept: PRIMARY CARE CLINIC | Facility: CLINIC | Age: 67
End: 2022-07-20
Payer: MEDICARE

## 2022-07-20 VITALS
BODY MASS INDEX: 28.75 KG/M2 | HEART RATE: 62 BPM | SYSTOLIC BLOOD PRESSURE: 118 MMHG | WEIGHT: 162.25 LBS | DIASTOLIC BLOOD PRESSURE: 68 MMHG | OXYGEN SATURATION: 97 % | HEIGHT: 63 IN

## 2022-07-20 DIAGNOSIS — N32.81 OAB (OVERACTIVE BLADDER): ICD-10-CM

## 2022-07-20 DIAGNOSIS — I10 BENIGN ESSENTIAL HYPERTENSION: Primary | ICD-10-CM

## 2022-07-20 DIAGNOSIS — E78.5 HYPERLIPIDEMIA, UNSPECIFIED HYPERLIPIDEMIA TYPE: ICD-10-CM

## 2022-07-20 DIAGNOSIS — R14.0 ABDOMINAL BLOATING: ICD-10-CM

## 2022-07-20 DIAGNOSIS — I10 BENIGN ESSENTIAL HYPERTENSION: ICD-10-CM

## 2022-07-20 LAB
ALBUMIN SERPL BCP-MCNC: 4.2 G/DL (ref 3.5–5.2)
ALP SERPL-CCNC: 79 U/L (ref 55–135)
ALT SERPL W/O P-5'-P-CCNC: 20 U/L (ref 10–44)
ANION GAP SERPL CALC-SCNC: 9 MMOL/L (ref 8–16)
AST SERPL-CCNC: 24 U/L (ref 10–40)
BASOPHILS # BLD AUTO: 0.03 K/UL (ref 0–0.2)
BASOPHILS NFR BLD: 0.6 % (ref 0–1.9)
BILIRUB SERPL-MCNC: 0.6 MG/DL (ref 0.1–1)
BUN SERPL-MCNC: 12 MG/DL (ref 8–23)
CALCIUM SERPL-MCNC: 9.6 MG/DL (ref 8.7–10.5)
CHLORIDE SERPL-SCNC: 104 MMOL/L (ref 95–110)
CHOLEST SERPL-MCNC: 159 MG/DL (ref 120–199)
CHOLEST/HDLC SERPL: 4.2 {RATIO} (ref 2–5)
CO2 SERPL-SCNC: 28 MMOL/L (ref 23–29)
CREAT SERPL-MCNC: 0.8 MG/DL (ref 0.5–1.4)
DIFFERENTIAL METHOD: ABNORMAL
EOSINOPHIL # BLD AUTO: 0.1 K/UL (ref 0–0.5)
EOSINOPHIL NFR BLD: 1.3 % (ref 0–8)
ERYTHROCYTE [DISTWIDTH] IN BLOOD BY AUTOMATED COUNT: 13.9 % (ref 11.5–14.5)
EST. GFR  (AFRICAN AMERICAN): >60 ML/MIN/1.73 M^2
EST. GFR  (NON AFRICAN AMERICAN): >60 ML/MIN/1.73 M^2
GLUCOSE SERPL-MCNC: 101 MG/DL (ref 70–110)
HCT VFR BLD AUTO: 37 % (ref 37–48.5)
HDLC SERPL-MCNC: 38 MG/DL (ref 40–75)
HDLC SERPL: 23.9 % (ref 20–50)
HGB BLD-MCNC: 11.9 G/DL (ref 12–16)
IMM GRANULOCYTES # BLD AUTO: 0.01 K/UL (ref 0–0.04)
IMM GRANULOCYTES NFR BLD AUTO: 0.2 % (ref 0–0.5)
LDLC SERPL CALC-MCNC: 110.4 MG/DL (ref 63–159)
LIPASE SERPL-CCNC: 41 U/L (ref 4–60)
LYMPHOCYTES # BLD AUTO: 1.4 K/UL (ref 1–4.8)
LYMPHOCYTES NFR BLD: 26.9 % (ref 18–48)
MCH RBC QN AUTO: 31.8 PG (ref 27–31)
MCHC RBC AUTO-ENTMCNC: 32.2 G/DL (ref 32–36)
MCV RBC AUTO: 99 FL (ref 82–98)
MONOCYTES # BLD AUTO: 0.6 K/UL (ref 0.3–1)
MONOCYTES NFR BLD: 11.9 % (ref 4–15)
NEUTROPHILS # BLD AUTO: 3.1 K/UL (ref 1.8–7.7)
NEUTROPHILS NFR BLD: 59.1 % (ref 38–73)
NONHDLC SERPL-MCNC: 121 MG/DL
NRBC BLD-RTO: 0 /100 WBC
PLATELET # BLD AUTO: 253 K/UL (ref 150–450)
PMV BLD AUTO: 10.1 FL (ref 9.2–12.9)
POTASSIUM SERPL-SCNC: 3.7 MMOL/L (ref 3.5–5.1)
PROT SERPL-MCNC: 7.6 G/DL (ref 6–8.4)
RBC # BLD AUTO: 3.74 M/UL (ref 4–5.4)
SODIUM SERPL-SCNC: 141 MMOL/L (ref 136–145)
TRIGL SERPL-MCNC: 53 MG/DL (ref 30–150)
WBC # BLD AUTO: 5.2 K/UL (ref 3.9–12.7)

## 2022-07-20 PROCEDURE — 3288F FALL RISK ASSESSMENT DOCD: CPT | Mod: CPTII,S$GLB,, | Performed by: INTERNAL MEDICINE

## 2022-07-20 PROCEDURE — 1126F PR PAIN SEVERITY QUANTIFIED, NO PAIN PRESENT: ICD-10-PCS | Mod: CPTII,S$GLB,, | Performed by: INTERNAL MEDICINE

## 2022-07-20 PROCEDURE — 99214 OFFICE O/P EST MOD 30 MIN: CPT | Mod: S$GLB,,, | Performed by: INTERNAL MEDICINE

## 2022-07-20 PROCEDURE — 3008F PR BODY MASS INDEX (BMI) DOCUMENTED: ICD-10-PCS | Mod: CPTII,S$GLB,, | Performed by: INTERNAL MEDICINE

## 2022-07-20 PROCEDURE — 1101F PR PT FALLS ASSESS DOC 0-1 FALLS W/OUT INJ PAST YR: ICD-10-PCS | Mod: CPTII,S$GLB,, | Performed by: INTERNAL MEDICINE

## 2022-07-20 PROCEDURE — 3078F PR MOST RECENT DIASTOLIC BLOOD PRESSURE < 80 MM HG: ICD-10-PCS | Mod: CPTII,S$GLB,, | Performed by: INTERNAL MEDICINE

## 2022-07-20 PROCEDURE — 80053 COMPREHEN METABOLIC PANEL: CPT | Performed by: INTERNAL MEDICINE

## 2022-07-20 PROCEDURE — 99214 PR OFFICE/OUTPT VISIT, EST, LEVL IV, 30-39 MIN: ICD-10-PCS | Mod: S$GLB,,, | Performed by: INTERNAL MEDICINE

## 2022-07-20 PROCEDURE — 3288F PR FALLS RISK ASSESSMENT DOCUMENTED: ICD-10-PCS | Mod: CPTII,S$GLB,, | Performed by: INTERNAL MEDICINE

## 2022-07-20 PROCEDURE — 85025 COMPLETE CBC W/AUTO DIFF WBC: CPT | Performed by: INTERNAL MEDICINE

## 2022-07-20 PROCEDURE — 3074F PR MOST RECENT SYSTOLIC BLOOD PRESSURE < 130 MM HG: ICD-10-PCS | Mod: CPTII,S$GLB,, | Performed by: INTERNAL MEDICINE

## 2022-07-20 PROCEDURE — 1160F RVW MEDS BY RX/DR IN RCRD: CPT | Mod: CPTII,S$GLB,, | Performed by: INTERNAL MEDICINE

## 2022-07-20 PROCEDURE — 36415 COLL VENOUS BLD VENIPUNCTURE: CPT | Mod: PN | Performed by: INTERNAL MEDICINE

## 2022-07-20 PROCEDURE — 1123F ACP DISCUSS/DSCN MKR DOCD: CPT | Mod: CPTII,S$GLB,, | Performed by: INTERNAL MEDICINE

## 2022-07-20 PROCEDURE — 1126F AMNT PAIN NOTED NONE PRSNT: CPT | Mod: CPTII,S$GLB,, | Performed by: INTERNAL MEDICINE

## 2022-07-20 PROCEDURE — 1158F PR ADVANCE CARE PLANNING DISCUSS DOCUMENTED IN MEDICAL RECORD: ICD-10-PCS | Mod: CPTII,S$GLB,, | Performed by: INTERNAL MEDICINE

## 2022-07-20 PROCEDURE — 3008F BODY MASS INDEX DOCD: CPT | Mod: CPTII,S$GLB,, | Performed by: INTERNAL MEDICINE

## 2022-07-20 PROCEDURE — 4010F ACE/ARB THERAPY RXD/TAKEN: CPT | Mod: CPTII,S$GLB,, | Performed by: INTERNAL MEDICINE

## 2022-07-20 PROCEDURE — 1159F MED LIST DOCD IN RCRD: CPT | Mod: CPTII,S$GLB,, | Performed by: INTERNAL MEDICINE

## 2022-07-20 PROCEDURE — 86677 HELICOBACTER PYLORI ANTIBODY: CPT | Performed by: INTERNAL MEDICINE

## 2022-07-20 PROCEDURE — 3078F DIAST BP <80 MM HG: CPT | Mod: CPTII,S$GLB,, | Performed by: INTERNAL MEDICINE

## 2022-07-20 PROCEDURE — 1158F ADVNC CARE PLAN TLK DOCD: CPT | Mod: CPTII,S$GLB,, | Performed by: INTERNAL MEDICINE

## 2022-07-20 PROCEDURE — 83690 ASSAY OF LIPASE: CPT | Performed by: INTERNAL MEDICINE

## 2022-07-20 PROCEDURE — 1101F PT FALLS ASSESS-DOCD LE1/YR: CPT | Mod: CPTII,S$GLB,, | Performed by: INTERNAL MEDICINE

## 2022-07-20 PROCEDURE — 3074F SYST BP LT 130 MM HG: CPT | Mod: CPTII,S$GLB,, | Performed by: INTERNAL MEDICINE

## 2022-07-20 PROCEDURE — 1159F PR MEDICATION LIST DOCUMENTED IN MEDICAL RECORD: ICD-10-PCS | Mod: CPTII,S$GLB,, | Performed by: INTERNAL MEDICINE

## 2022-07-20 PROCEDURE — 1123F PR ADV CARE PLAN DISCUSSED, PLAN OR SURROGATE DOCUMENTED: ICD-10-PCS | Mod: CPTII,S$GLB,, | Performed by: INTERNAL MEDICINE

## 2022-07-20 PROCEDURE — 1160F PR REVIEW ALL MEDS BY PRESCRIBER/CLIN PHARMACIST DOCUMENTED: ICD-10-PCS | Mod: CPTII,S$GLB,, | Performed by: INTERNAL MEDICINE

## 2022-07-20 PROCEDURE — 99999 PR PBB SHADOW E&M-EST. PATIENT-LVL III: CPT | Mod: PBBFAC,,, | Performed by: INTERNAL MEDICINE

## 2022-07-20 PROCEDURE — 99999 PR PBB SHADOW E&M-EST. PATIENT-LVL III: ICD-10-PCS | Mod: PBBFAC,,, | Performed by: INTERNAL MEDICINE

## 2022-07-20 PROCEDURE — 80061 LIPID PANEL: CPT | Performed by: INTERNAL MEDICINE

## 2022-07-20 PROCEDURE — 4010F PR ACE/ARB THEARPY RXD/TAKEN: ICD-10-PCS | Mod: CPTII,S$GLB,, | Performed by: INTERNAL MEDICINE

## 2022-07-20 NOTE — PROGRESS NOTES
"Subjective:       Patient ID: Nga Livingston is a 66 y.o. female.    Chief Complaint: HTN f/u    HPI  Recently saw Dr. Duran 7/11/22 for URI/ear pain. Rx amoxil BID x 7 days. Reports this cleared symptoms up. Lost her voice but has returned now. No fevers/chills. Ear pain resolved.     Pt c/o abdomen looking puffy. No pain. No pressure. No nausea/vomiting/diarrhea/constipation. No blood in the stool. Wonders if "something has dropped". Pt denies any pelvic pressure/incontinence. Feels bloated. Passing more gas. No belching. No dysphagia. Does have lactose intolerance. occ may have some cheese and ice cream.   No weight loss or decrease in appetite.   Sometimes does have reflux and only takes nexium or Tums prn. No burning/chest pains.  No night sweats/appetite changes.  EGD 8/14/18 - small hiatal hernia, LA grade A reflux esophagitis, stenosis.     OAB. Used to take oxybutynin and it works but then she was afraid of side effects. Stopped it now. Doing Kegel exercises and that's better.     HTN - coreg 25mg BID, olmesartan 20mg daily (changed from losartan 50mg), hctz 12.5mg daily (for leg swelling).   Checks BP at home daily.     HLD - on atorva 40Mg qd.   LDL 11/30/21 113.    MMG 2/2/22 neg.    Review of Systems  Comprehensive review of systems otherwise negative. See history/subjective section for more details.    Objective:      Physical Exam    /68 (BP Location: Left arm, Patient Position: Sitting, BP Method: Large (Manual))   Pulse 62   Ht 5' 3" (1.6 m)   Wt 73.6 kg (162 lb 4.1 oz)   LMP 03/22/2007   SpO2 97%   BMI 28.74 kg/m²     GEN - A+OX4, NAD   HEENT - PERRL, EOMI, OP clear. MMM. TM normal.   Neck - No thyromegaly or cervical LAD. No thyroid masses felt.  CV - RRR, no m/r   Chest - CTAB, no wheezing or rhonchi  Abd - S/NT/ND/+BS. No masses. No hernias noted.   Ext - 2+BDP and radial pulses. No C/C/E.  Neuro - 5/5 BUE and BLE strength.  MSK - normal gait. No spinal tenderness to palpation. "   Skin - No rash.    Previous labs reviewed.     Assessment/Plan     Diagnoses and all orders for this visit:    Benign essential hypertension - Stable and controlled. Continue current medications.  -     Comprehensive Metabolic Panel; Future    Hyperlipidemia, unspecified hyperlipidemia type - cont atorva 40.   -     Comprehensive Metabolic Panel; Future  -     Lipid Panel; Future    Abdominal bloating - no red flag features.  -     CBC Auto Differential; Future  -     Lipase; Future  -     H. PYLORI ANTIBODY, IGG; Future    OAB (overactive bladder) - cont kegel exercises.     Advance Care Planning     Date: 07/20/2022    Living Will  During this visit, I engaged the patient  in the advance care planning process.  The patient and I reviewed the role for advance directives and their purpose in directing future healthcare if the patient's unable to speak for him/herself.  At this point in time, the patient does have full decision-making capacity.  We discussed different extreme health states that she could experience, and reviewed what kind of medical care she would want in those situations.  The patient communicated that if she were comatose and had little chance of a meaningful recovery, she would not want machines/life-sustaining treatments used. In addition to the above preference, other important end-of-life issues for the patient include no peg. The patient has completed a living will to reflect these preferences and The patient has already designated a healthcare power of  to make decisions on [unfilled] behalf.  I spent a total of 3 minutes engaging the patient in this advance care planning discussion.          Power of   I initiated the process of advance care planning today and explained the importance of this process to the patient.  I introduced the concept of advance directives to the patient, as well. Then the patient received detailed information about the importance of designating a  Health Care Power of  (HCPOA). She was also instructed to communicate with this person about their wishes for future healthcare, should she become sick and lose decision-making capacity. The patient has not previously appointed a HCPOA. After our discussion, the patient has decided to complete a HCPOA and has appointed her sister, health care agent: Macie Garcia & health care agent number: [phone] I spent a total time of 3 minutes discussing this issue with the patient.       Follow up in about 4 months (around 11/20/2022).      Elaine Mahmood MD  Department of Internal Medicine - [facility]  8:35 AM

## 2022-07-21 ENCOUNTER — TELEPHONE (OUTPATIENT)
Dept: PRIMARY CARE CLINIC | Facility: CLINIC | Age: 67
End: 2022-07-21
Payer: MEDICARE

## 2022-07-21 LAB — H PYLORI IGG SERPL QL IA: NEGATIVE

## 2022-07-21 NOTE — TELEPHONE ENCOUNTER
Please call and notify pt:    Pancreatic enzyme, liver and kidney functions and H pylori antibody are all normal. Anemia is stable. Her cholesterol is overall better but LDL, bad cholesterol, is not quite at goal. Goal LDL is <70 and hers is 110. Please see if she's ok w/ increasing atorva from 40 to 80mg daily especially due to family history of stroke. Please send me back pt's reply.

## 2022-07-25 NOTE — TELEPHONE ENCOUNTER
Called and spoke with patient .  Confirmed that she had gotten message about her lab results.  Patient confirmed that she had read them today.

## 2022-11-17 ENCOUNTER — LAB VISIT (OUTPATIENT)
Dept: LAB | Facility: HOSPITAL | Age: 67
End: 2022-11-17
Attending: INTERNAL MEDICINE
Payer: MEDICARE

## 2022-11-17 ENCOUNTER — OFFICE VISIT (OUTPATIENT)
Dept: PRIMARY CARE CLINIC | Facility: CLINIC | Age: 67
End: 2022-11-17
Payer: MEDICARE

## 2022-11-17 VITALS
SYSTOLIC BLOOD PRESSURE: 124 MMHG | DIASTOLIC BLOOD PRESSURE: 84 MMHG | OXYGEN SATURATION: 99 % | BODY MASS INDEX: 27.92 KG/M2 | HEART RATE: 62 BPM | WEIGHT: 163.56 LBS | HEIGHT: 64 IN

## 2022-11-17 DIAGNOSIS — E78.5 HYPERLIPIDEMIA, UNSPECIFIED HYPERLIPIDEMIA TYPE: ICD-10-CM

## 2022-11-17 DIAGNOSIS — R73.02 IGT (IMPAIRED GLUCOSE TOLERANCE): ICD-10-CM

## 2022-11-17 DIAGNOSIS — I10 BENIGN ESSENTIAL HYPERTENSION: ICD-10-CM

## 2022-11-17 DIAGNOSIS — E78.5 HYPERLIPIDEMIA, UNSPECIFIED HYPERLIPIDEMIA TYPE: Primary | ICD-10-CM

## 2022-11-17 DIAGNOSIS — R10.32 LEFT LOWER QUADRANT PAIN: ICD-10-CM

## 2022-11-17 LAB
ALBUMIN SERPL BCP-MCNC: 4.2 G/DL (ref 3.5–5.2)
ALP SERPL-CCNC: 70 U/L (ref 55–135)
ALT SERPL W/O P-5'-P-CCNC: 17 U/L (ref 10–44)
ANION GAP SERPL CALC-SCNC: 12 MMOL/L (ref 8–16)
AST SERPL-CCNC: 19 U/L (ref 10–40)
BILIRUB SERPL-MCNC: 0.6 MG/DL (ref 0.1–1)
BUN SERPL-MCNC: 19 MG/DL (ref 8–23)
CALCIUM SERPL-MCNC: 9.4 MG/DL (ref 8.7–10.5)
CHLORIDE SERPL-SCNC: 102 MMOL/L (ref 95–110)
CHOLEST SERPL-MCNC: 134 MG/DL (ref 120–199)
CHOLEST/HDLC SERPL: 3.5 {RATIO} (ref 2–5)
CO2 SERPL-SCNC: 25 MMOL/L (ref 23–29)
CREAT SERPL-MCNC: 0.9 MG/DL (ref 0.5–1.4)
EST. GFR  (NO RACE VARIABLE): >60 ML/MIN/1.73 M^2
ESTIMATED AVG GLUCOSE: 111 MG/DL (ref 68–131)
GLUCOSE SERPL-MCNC: 92 MG/DL (ref 70–110)
HBA1C MFR BLD: 5.5 % (ref 4–5.6)
HDLC SERPL-MCNC: 38 MG/DL (ref 40–75)
HDLC SERPL: 28.4 % (ref 20–50)
LDLC SERPL CALC-MCNC: 80.4 MG/DL (ref 63–159)
NONHDLC SERPL-MCNC: 96 MG/DL
POTASSIUM SERPL-SCNC: 3.7 MMOL/L (ref 3.5–5.1)
PROT SERPL-MCNC: 7.4 G/DL (ref 6–8.4)
SODIUM SERPL-SCNC: 139 MMOL/L (ref 136–145)
TRIGL SERPL-MCNC: 78 MG/DL (ref 30–150)

## 2022-11-17 PROCEDURE — 1159F MED LIST DOCD IN RCRD: CPT | Mod: CPTII,S$GLB,, | Performed by: INTERNAL MEDICINE

## 2022-11-17 PROCEDURE — 3079F PR MOST RECENT DIASTOLIC BLOOD PRESSURE 80-89 MM HG: ICD-10-PCS | Mod: CPTII,S$GLB,, | Performed by: INTERNAL MEDICINE

## 2022-11-17 PROCEDURE — 83036 HEMOGLOBIN GLYCOSYLATED A1C: CPT | Performed by: INTERNAL MEDICINE

## 2022-11-17 PROCEDURE — 1101F PT FALLS ASSESS-DOCD LE1/YR: CPT | Mod: CPTII,S$GLB,, | Performed by: INTERNAL MEDICINE

## 2022-11-17 PROCEDURE — 3079F DIAST BP 80-89 MM HG: CPT | Mod: CPTII,S$GLB,, | Performed by: INTERNAL MEDICINE

## 2022-11-17 PROCEDURE — 1101F PR PT FALLS ASSESS DOC 0-1 FALLS W/OUT INJ PAST YR: ICD-10-PCS | Mod: CPTII,S$GLB,, | Performed by: INTERNAL MEDICINE

## 2022-11-17 PROCEDURE — 99214 OFFICE O/P EST MOD 30 MIN: CPT | Mod: S$GLB,,, | Performed by: INTERNAL MEDICINE

## 2022-11-17 PROCEDURE — 1125F AMNT PAIN NOTED PAIN PRSNT: CPT | Mod: CPTII,S$GLB,, | Performed by: INTERNAL MEDICINE

## 2022-11-17 PROCEDURE — 1157F ADVNC CARE PLAN IN RCRD: CPT | Mod: CPTII,S$GLB,, | Performed by: INTERNAL MEDICINE

## 2022-11-17 PROCEDURE — 3074F SYST BP LT 130 MM HG: CPT | Mod: CPTII,S$GLB,, | Performed by: INTERNAL MEDICINE

## 2022-11-17 PROCEDURE — 99999 PR PBB SHADOW E&M-EST. PATIENT-LVL IV: ICD-10-PCS | Mod: PBBFAC,,, | Performed by: INTERNAL MEDICINE

## 2022-11-17 PROCEDURE — 1125F PR PAIN SEVERITY QUANTIFIED, PAIN PRESENT: ICD-10-PCS | Mod: CPTII,S$GLB,, | Performed by: INTERNAL MEDICINE

## 2022-11-17 PROCEDURE — 99214 PR OFFICE/OUTPT VISIT, EST, LEVL IV, 30-39 MIN: ICD-10-PCS | Mod: S$GLB,,, | Performed by: INTERNAL MEDICINE

## 2022-11-17 PROCEDURE — 80053 COMPREHEN METABOLIC PANEL: CPT | Performed by: INTERNAL MEDICINE

## 2022-11-17 PROCEDURE — 1157F PR ADVANCE CARE PLAN OR EQUIV PRESENT IN MEDICAL RECORD: ICD-10-PCS | Mod: CPTII,S$GLB,, | Performed by: INTERNAL MEDICINE

## 2022-11-17 PROCEDURE — 1159F PR MEDICATION LIST DOCUMENTED IN MEDICAL RECORD: ICD-10-PCS | Mod: CPTII,S$GLB,, | Performed by: INTERNAL MEDICINE

## 2022-11-17 PROCEDURE — 4010F PR ACE/ARB THEARPY RXD/TAKEN: ICD-10-PCS | Mod: CPTII,S$GLB,, | Performed by: INTERNAL MEDICINE

## 2022-11-17 PROCEDURE — 36415 COLL VENOUS BLD VENIPUNCTURE: CPT | Mod: PN | Performed by: INTERNAL MEDICINE

## 2022-11-17 PROCEDURE — 3074F PR MOST RECENT SYSTOLIC BLOOD PRESSURE < 130 MM HG: ICD-10-PCS | Mod: CPTII,S$GLB,, | Performed by: INTERNAL MEDICINE

## 2022-11-17 PROCEDURE — 3008F PR BODY MASS INDEX (BMI) DOCUMENTED: ICD-10-PCS | Mod: CPTII,S$GLB,, | Performed by: INTERNAL MEDICINE

## 2022-11-17 PROCEDURE — 3008F BODY MASS INDEX DOCD: CPT | Mod: CPTII,S$GLB,, | Performed by: INTERNAL MEDICINE

## 2022-11-17 PROCEDURE — 1160F PR REVIEW ALL MEDS BY PRESCRIBER/CLIN PHARMACIST DOCUMENTED: ICD-10-PCS | Mod: CPTII,S$GLB,, | Performed by: INTERNAL MEDICINE

## 2022-11-17 PROCEDURE — 3288F FALL RISK ASSESSMENT DOCD: CPT | Mod: CPTII,S$GLB,, | Performed by: INTERNAL MEDICINE

## 2022-11-17 PROCEDURE — 99999 PR PBB SHADOW E&M-EST. PATIENT-LVL IV: CPT | Mod: PBBFAC,,, | Performed by: INTERNAL MEDICINE

## 2022-11-17 PROCEDURE — 1160F RVW MEDS BY RX/DR IN RCRD: CPT | Mod: CPTII,S$GLB,, | Performed by: INTERNAL MEDICINE

## 2022-11-17 PROCEDURE — 4010F ACE/ARB THERAPY RXD/TAKEN: CPT | Mod: CPTII,S$GLB,, | Performed by: INTERNAL MEDICINE

## 2022-11-17 PROCEDURE — 3288F PR FALLS RISK ASSESSMENT DOCUMENTED: ICD-10-PCS | Mod: CPTII,S$GLB,, | Performed by: INTERNAL MEDICINE

## 2022-11-17 PROCEDURE — 80061 LIPID PANEL: CPT | Performed by: INTERNAL MEDICINE

## 2022-11-17 RX ORDER — ROSUVASTATIN CALCIUM 40 MG/1
40 TABLET, COATED ORAL NIGHTLY
Qty: 90 TABLET | Refills: 3
Start: 2022-11-17

## 2022-11-17 RX ORDER — ROSUVASTATIN CALCIUM 40 MG/1
10 TABLET, COATED ORAL NIGHTLY
COMMUNITY
End: 2022-11-17 | Stop reason: SDUPTHER

## 2022-11-17 NOTE — PROGRESS NOTES
"Subjective:       Patient ID: Nga Livingston is a 66 y.o. female.    Chief Complaint: Flank Pain    HPI  HTN - coreg 25mg BID, olmesartan 20mg daily, hctz 12.5mg daily (for leg swelling).   BP well controlled at home.     HLD - crestor 40mg nightly.   Family history of stroke.   Changed from atorvastatin.   No issues w/ it. No muscle cramps.     Reports has LLQ pain that's intermittent but seems to be more often (present for months). Sometimes more intense than others - no known exacerbating factor. No nausea/vomiting/diarrhea or constipation. No blood in stool. Not better w/ BM.  No unexpected weight changes or appetite issues. No night sweats. No painful urination. Still doing her kegel exercises, which helps w/ her urination. No fevers/chills.     Review of Systems   Constitutional:  Negative for appetite change, chills and fever.   HENT:  Negative for congestion, postnasal drip and rhinorrhea.    Eyes:  Negative for visual disturbance (wears glasses).   Respiratory:  Negative for cough, shortness of breath and wheezing.    Cardiovascular:  Negative for chest pain, palpitations and leg swelling.   Gastrointestinal:  Positive for abdominal pain. Negative for blood in stool, constipation, diarrhea, nausea and vomiting.   Genitourinary:  Negative for dysuria and frequency.   Musculoskeletal:  Negative for arthralgias.   Skin:  Negative for rash.   Neurological:  Positive for numbness (in the fingers sometimes - mostly at night. better when shaking it out.). Negative for dizziness and weakness.   Hematological:  Does not bruise/bleed easily.   Psychiatric/Behavioral:  Negative for dysphoric mood. The patient is not nervous/anxious.    Comprehensive review of systems otherwise negative. See history/subjective section for more details.    Objective:      Physical Exam    /84 (BP Location: Left arm, Patient Position: Sitting, BP Method: Large (Manual))   Pulse 62   Ht 5' 4" (1.626 m)   Wt 74.2 kg (163 lb 9.3 " oz)   LMP 03/22/2007   SpO2 99%   BMI 28.08 kg/m²     GEN - A+OX4, NAD   HEENT - PERRL, EOMI, OP clear. MMM. Tm NORMAL. Wears glasses.   Neck - No thyromegaly or cervical LAD. No thyroid masses felt.  CV - RRR, no m/r   Chest - CTAB, no wheezing or rhonchi  Abd - S/NT/ND/+BS.   Ext - 2+BDP and radial pulses. No C/C/E.  Neuro - 5/5 BUE and BLE strength. Normal gait.   Skin - No rash.    Labs reviewed.     Assessment/Plan     Nga was seen today for flank pain.    Diagnoses and all orders for this visit:    Hyperlipidemia, unspecified hyperlipidemia type  -     rosuvastatin (CRESTOR) 40 MG Tab; Take 1 tablet (40 mg total) by mouth every evening.  -     Comprehensive Metabolic Panel; Future  -     Lipid Panel; Future    Benign essential hypertension  -     Comprehensive Metabolic Panel; Future    IGT (impaired glucose tolerance)  -     Hemoglobin A1C; Future    Left lower quadrant pain - may be MSK?  -     CT Abdomen Pelvis  Without Contrast; Future      Follow up in about 4 months (around 3/17/2023).      Elaine Mahmood MD  Department of Internal Medicine - [facility]  7:49 AM

## 2022-12-03 ENCOUNTER — HOSPITAL ENCOUNTER (OUTPATIENT)
Dept: RADIOLOGY | Facility: HOSPITAL | Age: 67
Discharge: HOME | End: 2022-12-03
Attending: INTERNAL MEDICINE
Payer: MEDICARE

## 2022-12-03 DIAGNOSIS — R10.32 LEFT LOWER QUADRANT PAIN: ICD-10-CM

## 2022-12-03 PROCEDURE — 74176 CT ABDOMEN PELVIS WITHOUT CONTRAST: ICD-10-PCS | Mod: 26,,, | Performed by: RADIOLOGY

## 2022-12-03 PROCEDURE — 74176 CT ABD & PELVIS W/O CONTRAST: CPT | Mod: 26,,, | Performed by: RADIOLOGY

## 2022-12-03 PROCEDURE — 74176 CT ABD & PELVIS W/O CONTRAST: CPT | Mod: TC

## 2023-02-27 ENCOUNTER — TELEPHONE (OUTPATIENT)
Dept: PRIMARY CARE CLINIC | Facility: CLINIC | Age: 68
End: 2023-02-27

## 2023-02-27 DIAGNOSIS — I10 BENIGN ESSENTIAL HYPERTENSION: ICD-10-CM

## 2023-02-27 DIAGNOSIS — Z12.31 ENCOUNTER FOR SCREENING MAMMOGRAM FOR MALIGNANT NEOPLASM OF BREAST: Primary | ICD-10-CM

## 2023-02-27 RX ORDER — CARVEDILOL 25 MG/1
TABLET ORAL
Qty: 180 TABLET | Refills: 3 | Status: SHIPPED | OUTPATIENT
Start: 2023-02-27

## 2023-02-27 NOTE — TELEPHONE ENCOUNTER
----- Message from Catina Castañeda sent at 2/27/2023 12:34 PM CST -----  Contact: Elaine   Elaine would like an order put in for a mammo

## 2023-02-27 NOTE — TELEPHONE ENCOUNTER
----- Message from Catina Castañeda sent at 2/27/2023 12:36 PM CST -----  Contact: Elaine   Requesting an RX refill or new RX.  Is this a refill or new RX: refill   RX name and strength (copy/paste from chart):  Carvelilol 25 mg  2 x a day   Is this a 30 day or 90 day RX:   Pharmacy name and phone # (copy/paste from chart): [facility] to [facility]   The doctors have asked that we provide their patients with the following 2 reminders -- prescription refills can take up to 72 hours, and a friendly reminder that in the future you can use your [facility] account to request refills: SHe only has 5 pills left

## 2023-03-01 NOTE — PROGRESS NOTES
Subjective:       Patient ID: Nga Livingston is a 67 y.o. female.    Chief Complaint: Hyperlipidemia    HPI  Lives by self at home. No issues w/ urination. No incontinence.   No assistance w/ ADLs. No issues driving during the day. Avoid night time driving. Wears glasses.   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 Lots of Shinto activities also.     HTN - coreg 25mg BID, olmesartan 20mg qd, hctz 12.5mg daily.  Checked her BP at home and 120s/70s.   No dizziness/lightheadedness/SOB/MOREIRA/palpitations.   Does some exercises at home - about 30 min 4 days a week.   Every now and then may have a numbness in the fingers in the middle of the night (less frequent during the day time).     HLD - crestor 40mg daily.  Family h/o CVA.   Ct a/p 12/2022 - [facility]:  Aorta tapers normally with mild aortoiliac calcific plaque.  LDL 11/2022 80.4  Occasional biceps pain but not bothersome. No weakness.   No weight changes.     Chronic intermittent LLQ pain. Still present. Hasn't changed. No nausea/vomiting/diarrhea. Sometimes w/ constipation - may take stool softener or metamucil, which helps. No blood in the stool.   CT A/P 12/3/22 -    1. No intra-abdominopelvic abnormality to account for chronic left lower quadrant pain.  2. 0.5 cm solid pulmonary nodule in the right upper lobe, as above.  For a solid nodule <6 mm, Fleischner Society 2017 guidelines recommend no routine follow up for a low risk patient, or follow-up with non-contrast chest CT at 12 months in a high risk patient.  3. Colonic diverticulosis without diverticulitis.  4. Additional findings as above.  Former smoker - smoked maybe 1/2 ppd for about 10 yrs but thinks probably less; quit 40 yrs ago.     No anxiety and depression.     Due for pneumovax.   MMG schedule 3/7/23.     Review of Systems  Comprehensive review of systems otherwise negative. See history/subjective section for more details.    Objective:      Physical Exam   "  /72 (BP Location: Left arm, Patient Position: Sitting, BP Method: Large (Manual))   Pulse 69   Temp 98.6 °F (37 °C) (Oral)   Ht 5' 3" (1.6 m)   Wt 74.3 kg (163 lb 12.8 oz)   LMP 03/22/2007   SpO2 98%   BMI 29.02 kg/m²     GEN - A+OX4, NAD   HEENT - PERRL, EOMI, OP clear. MMM. TM normal.   Neck - No cervical LAD.   CV - RRR, no m/r   Chest - CTAB, no wheezing or rhonchi  Abd - S/NT/ND/+BS.   Ext - 2+BDP and radial pulses. No C/C/E.  Neuro - 5/5 BUE and BLE strength. 2+ DTRs.   MSK - no spinal tenderness to palpation.  Skin - No rash.    PREVIOUS LABS REVIEWED.     Assessment/Plan     Nga was seen today for hyperlipidemia.    Diagnoses and all orders for this visit:    Benign essential hypertension - Stable and controlled. Continue current medications.  -     Comprehensive Metabolic Panel; Future  -     TSH; Future    Hyperlipidemia, unspecified hyperlipidemia type - family h/o CVA. Cont crestor.   -     Comprehensive Metabolic Panel; Future  -     Lipid Panel; Future    Aortic atherosclerosis - cont crestor 40.   -     Lipid Panel; Future    BMI 29.02,adult - work on diet and cont exercise.   -     CBC Auto Differential; Future    Left lower quadrant pain  Chronic and intermittent. Not bothersome. CT A/P as above. Metamucil/stool softener helps w/ constipation.     Advance Care Planning     Date: 03/02/2023    Living Will  Reviewed living will and HCPOA on file. Pt wishes no changes to be made.      Power of   Reviewed living will and HCPOA on file. Pt wishes no changes to be made.      Pneumovax today.    Follow up in about 4 months (around 7/2/2023). Labs a few days prior.      Elaine Mahmood MD  Department of Internal Medicine - [facility]  4:04 PM    "

## 2023-03-02 ENCOUNTER — OFFICE VISIT (OUTPATIENT)
Dept: PRIMARY CARE CLINIC | Facility: CLINIC | Age: 68
End: 2023-03-02
Payer: MEDICARE

## 2023-03-02 VITALS
OXYGEN SATURATION: 98 % | DIASTOLIC BLOOD PRESSURE: 72 MMHG | HEIGHT: 63 IN | WEIGHT: 163.81 LBS | HEART RATE: 69 BPM | BODY MASS INDEX: 29.02 KG/M2 | SYSTOLIC BLOOD PRESSURE: 118 MMHG | TEMPERATURE: 99 F

## 2023-03-02 DIAGNOSIS — E78.5 HYPERLIPIDEMIA, UNSPECIFIED HYPERLIPIDEMIA TYPE: Chronic | ICD-10-CM

## 2023-03-02 DIAGNOSIS — I10 BENIGN ESSENTIAL HYPERTENSION: Primary | Chronic | ICD-10-CM

## 2023-03-02 DIAGNOSIS — I70.0 AORTIC ATHEROSCLEROSIS: ICD-10-CM

## 2023-03-02 DIAGNOSIS — R10.32 LEFT LOWER QUADRANT PAIN: ICD-10-CM

## 2023-03-02 PROCEDURE — 3078F PR MOST RECENT DIASTOLIC BLOOD PRESSURE < 80 MM HG: ICD-10-PCS | Mod: CPTII,S$GLB,, | Performed by: INTERNAL MEDICINE

## 2023-03-02 PROCEDURE — 1101F PT FALLS ASSESS-DOCD LE1/YR: CPT | Mod: CPTII,S$GLB,, | Performed by: INTERNAL MEDICINE

## 2023-03-02 PROCEDURE — 4010F PR ACE/ARB THEARPY RXD/TAKEN: ICD-10-PCS | Mod: CPTII,S$GLB,, | Performed by: INTERNAL MEDICINE

## 2023-03-02 PROCEDURE — 1158F PR ADVANCE CARE PLANNING DISCUSS DOCUMENTED IN MEDICAL RECORD: ICD-10-PCS | Mod: CPTII,S$GLB,, | Performed by: INTERNAL MEDICINE

## 2023-03-02 PROCEDURE — 3008F PR BODY MASS INDEX (BMI) DOCUMENTED: ICD-10-PCS | Mod: CPTII,S$GLB,, | Performed by: INTERNAL MEDICINE

## 2023-03-02 PROCEDURE — 1123F ACP DISCUSS/DSCN MKR DOCD: CPT | Mod: CPTII,S$GLB,, | Performed by: INTERNAL MEDICINE

## 2023-03-02 PROCEDURE — 1160F RVW MEDS BY RX/DR IN RCRD: CPT | Mod: CPTII,S$GLB,, | Performed by: INTERNAL MEDICINE

## 2023-03-02 PROCEDURE — 1159F PR MEDICATION LIST DOCUMENTED IN MEDICAL RECORD: ICD-10-PCS | Mod: CPTII,S$GLB,, | Performed by: INTERNAL MEDICINE

## 2023-03-02 PROCEDURE — 1126F PR PAIN SEVERITY QUANTIFIED, NO PAIN PRESENT: ICD-10-PCS | Mod: CPTII,S$GLB,, | Performed by: INTERNAL MEDICINE

## 2023-03-02 PROCEDURE — G0009 ADMIN PNEUMOCOCCAL VACCINE: HCPCS | Mod: S$GLB,,, | Performed by: INTERNAL MEDICINE

## 2023-03-02 PROCEDURE — 99214 PR OFFICE/OUTPT VISIT, EST, LEVL IV, 30-39 MIN: ICD-10-PCS | Mod: S$GLB,,, | Performed by: INTERNAL MEDICINE

## 2023-03-02 PROCEDURE — 1158F ADVNC CARE PLAN TLK DOCD: CPT | Mod: CPTII,S$GLB,, | Performed by: INTERNAL MEDICINE

## 2023-03-02 PROCEDURE — 3288F PR FALLS RISK ASSESSMENT DOCUMENTED: ICD-10-PCS | Mod: CPTII,S$GLB,, | Performed by: INTERNAL MEDICINE

## 2023-03-02 PROCEDURE — 1126F AMNT PAIN NOTED NONE PRSNT: CPT | Mod: CPTII,S$GLB,, | Performed by: INTERNAL MEDICINE

## 2023-03-02 PROCEDURE — 99999 PR PBB SHADOW E&M-EST. PATIENT-LVL IV: ICD-10-PCS | Mod: PBBFAC,,, | Performed by: INTERNAL MEDICINE

## 2023-03-02 PROCEDURE — 3074F SYST BP LT 130 MM HG: CPT | Mod: CPTII,S$GLB,, | Performed by: INTERNAL MEDICINE

## 2023-03-02 PROCEDURE — 1123F PR ADV CARE PLAN DISCUSSED, PLAN OR SURROGATE DOCUMENTED: ICD-10-PCS | Mod: CPTII,S$GLB,, | Performed by: INTERNAL MEDICINE

## 2023-03-02 PROCEDURE — 90732 PPSV23 VACC 2 YRS+ SUBQ/IM: CPT | Mod: S$GLB,,, | Performed by: INTERNAL MEDICINE

## 2023-03-02 PROCEDURE — G0009 PNEUMOCOCCAL POLYSACCHARIDE VACCINE 23-VALENT =>2YO SQ IM: ICD-10-PCS | Mod: S$GLB,,, | Performed by: INTERNAL MEDICINE

## 2023-03-02 PROCEDURE — 3008F BODY MASS INDEX DOCD: CPT | Mod: CPTII,S$GLB,, | Performed by: INTERNAL MEDICINE

## 2023-03-02 PROCEDURE — 99999 PR PBB SHADOW E&M-EST. PATIENT-LVL IV: CPT | Mod: PBBFAC,,, | Performed by: INTERNAL MEDICINE

## 2023-03-02 PROCEDURE — 1101F PR PT FALLS ASSESS DOC 0-1 FALLS W/OUT INJ PAST YR: ICD-10-PCS | Mod: CPTII,S$GLB,, | Performed by: INTERNAL MEDICINE

## 2023-03-02 PROCEDURE — 1160F PR REVIEW ALL MEDS BY PRESCRIBER/CLIN PHARMACIST DOCUMENTED: ICD-10-PCS | Mod: CPTII,S$GLB,, | Performed by: INTERNAL MEDICINE

## 2023-03-02 PROCEDURE — 3078F DIAST BP <80 MM HG: CPT | Mod: CPTII,S$GLB,, | Performed by: INTERNAL MEDICINE

## 2023-03-02 PROCEDURE — 3074F PR MOST RECENT SYSTOLIC BLOOD PRESSURE < 130 MM HG: ICD-10-PCS | Mod: CPTII,S$GLB,, | Performed by: INTERNAL MEDICINE

## 2023-03-02 PROCEDURE — 99214 OFFICE O/P EST MOD 30 MIN: CPT | Mod: S$GLB,,, | Performed by: INTERNAL MEDICINE

## 2023-03-02 PROCEDURE — 3288F FALL RISK ASSESSMENT DOCD: CPT | Mod: CPTII,S$GLB,, | Performed by: INTERNAL MEDICINE

## 2023-03-02 PROCEDURE — 4010F ACE/ARB THERAPY RXD/TAKEN: CPT | Mod: CPTII,S$GLB,, | Performed by: INTERNAL MEDICINE

## 2023-03-02 PROCEDURE — 90732 PNEUMOCOCCAL POLYSACCHARIDE VACCINE 23-VALENT =>2YO SQ IM: ICD-10-PCS | Mod: S$GLB,,, | Performed by: INTERNAL MEDICINE

## 2023-03-02 PROCEDURE — 1159F MED LIST DOCD IN RCRD: CPT | Mod: CPTII,S$GLB,, | Performed by: INTERNAL MEDICINE

## 2023-03-03 NOTE — PROGRESS NOTES
Pt here for MD visit. Pneumovax shot ordered   Pt ID by name and . Allergies reviewed.   VIS statement given and reviewed.   Pt agreeable to getting Pneumovax shot today.  Shot administered to right deltoid.   Pt instructed to remain in clinic for 15 minutes.  No complaints   Pt understands to call clinic or portal message with any questions or concerns

## 2023-03-07 ENCOUNTER — HOSPITAL ENCOUNTER (OUTPATIENT)
Dept: RADIOLOGY | Facility: HOSPITAL | Age: 68
Discharge: HOME | End: 2023-03-07
Attending: INTERNAL MEDICINE
Payer: MEDICARE

## 2023-03-07 DIAGNOSIS — Z12.31 ENCOUNTER FOR SCREENING MAMMOGRAM FOR MALIGNANT NEOPLASM OF BREAST: ICD-10-CM

## 2023-03-07 PROCEDURE — 77063 BREAST TOMOSYNTHESIS BI: CPT | Mod: 26,,, | Performed by: RADIOLOGY

## 2023-03-07 PROCEDURE — 77067 SCR MAMMO BI INCL CAD: CPT | Mod: 26,,, | Performed by: RADIOLOGY

## 2023-03-07 PROCEDURE — 77067 SCR MAMMO BI INCL CAD: CPT | Mod: TC

## 2023-03-07 PROCEDURE — 77067 MAMMO DIGITAL SCREENING BILAT WITH TOMO: ICD-10-PCS | Mod: 26,,, | Performed by: RADIOLOGY

## 2023-03-07 PROCEDURE — 77063 MAMMO DIGITAL SCREENING BILAT WITH TOMO: ICD-10-PCS | Mod: 26,,, | Performed by: RADIOLOGY

## 2023-04-11 ENCOUNTER — TELEPHONE (OUTPATIENT)
Dept: PRIMARY CARE CLINIC | Facility: CLINIC | Age: 68
End: 2023-04-11

## 2023-04-11 NOTE — TELEPHONE ENCOUNTER
Spoke w/ pt. Stated she is having itching and burning w/ vaginal discharge. Looked for appt today, none readily available with a primary care Dr.   Gave pt number to [facility] , and advised they also have walk in clinic. Will double check for appts.

## 2023-04-11 NOTE — TELEPHONE ENCOUNTER
----- Message from Zita Bernal sent at 4/11/2023 10:39 AM CDT -----  Contact: [phone] Patient  Patient would like to get medical advice.  Symptoms (please be specific):   yeast infection  How long have you had these symptoms: 4 days  Would you like a call back, or a response through your [facility] portal?:   call back  Pharmacy name and phone # (copy from chart):    [id number] [place] - [address] AT [facility] [address]  [address]  [place] [address]  Phone: [phone] Fax: [phone]    Comments:

## 2023-05-11 ENCOUNTER — OFFICE VISIT (OUTPATIENT)
Dept: OBSTETRICS AND GYNECOLOGY | Facility: CLINIC | Age: 68
End: 2023-05-11
Attending: OBSTETRICS & GYNECOLOGY
Payer: MEDICARE

## 2023-05-11 VITALS
DIASTOLIC BLOOD PRESSURE: 76 MMHG | HEIGHT: 63 IN | SYSTOLIC BLOOD PRESSURE: 123 MMHG | BODY MASS INDEX: 28.85 KG/M2 | WEIGHT: 162.81 LBS

## 2023-05-11 DIAGNOSIS — Z12.4 PAP SMEAR FOR CERVICAL CANCER SCREENING: ICD-10-CM

## 2023-05-11 DIAGNOSIS — Z01.419 ENCOUNTER FOR GYNECOLOGICAL EXAMINATION WITHOUT ABNORMAL FINDING: Primary | ICD-10-CM

## 2023-05-11 DIAGNOSIS — N89.8 VAGINAL ITCHING: ICD-10-CM

## 2023-05-11 DIAGNOSIS — N89.8 VAGINAL DRYNESS: ICD-10-CM

## 2023-05-11 PROCEDURE — 3078F PR MOST RECENT DIASTOLIC BLOOD PRESSURE < 80 MM HG: ICD-10-PCS | Mod: CPTII,S$GLB,, | Performed by: OBSTETRICS & GYNECOLOGY

## 2023-05-11 PROCEDURE — G0101 CA SCREEN;PELVIC/BREAST EXAM: HCPCS | Mod: GZ,S$GLB,, | Performed by: OBSTETRICS & GYNECOLOGY

## 2023-05-11 PROCEDURE — 1101F PT FALLS ASSESS-DOCD LE1/YR: CPT | Mod: CPTII,S$GLB,, | Performed by: OBSTETRICS & GYNECOLOGY

## 2023-05-11 PROCEDURE — 4010F ACE/ARB THERAPY RXD/TAKEN: CPT | Mod: CPTII,S$GLB,, | Performed by: OBSTETRICS & GYNECOLOGY

## 2023-05-11 PROCEDURE — 3074F PR MOST RECENT SYSTOLIC BLOOD PRESSURE < 130 MM HG: ICD-10-PCS | Mod: CPTII,S$GLB,, | Performed by: OBSTETRICS & GYNECOLOGY

## 2023-05-11 PROCEDURE — 3288F FALL RISK ASSESSMENT DOCD: CPT | Mod: CPTII,S$GLB,, | Performed by: OBSTETRICS & GYNECOLOGY

## 2023-05-11 PROCEDURE — 1126F PR PAIN SEVERITY QUANTIFIED, NO PAIN PRESENT: ICD-10-PCS | Mod: CPTII,S$GLB,, | Performed by: OBSTETRICS & GYNECOLOGY

## 2023-05-11 PROCEDURE — G0101 PR CA SCREEN;PELVIC/BREAST EXAM: ICD-10-PCS | Mod: GZ,S$GLB,, | Performed by: OBSTETRICS & GYNECOLOGY

## 2023-05-11 PROCEDURE — 3008F BODY MASS INDEX DOCD: CPT | Mod: CPTII,S$GLB,, | Performed by: OBSTETRICS & GYNECOLOGY

## 2023-05-11 PROCEDURE — 1157F ADVNC CARE PLAN IN RCRD: CPT | Mod: CPTII,S$GLB,, | Performed by: OBSTETRICS & GYNECOLOGY

## 2023-05-11 PROCEDURE — 1159F MED LIST DOCD IN RCRD: CPT | Mod: CPTII,S$GLB,, | Performed by: OBSTETRICS & GYNECOLOGY

## 2023-05-11 PROCEDURE — 1160F RVW MEDS BY RX/DR IN RCRD: CPT | Mod: CPTII,S$GLB,, | Performed by: OBSTETRICS & GYNECOLOGY

## 2023-05-11 PROCEDURE — 1159F PR MEDICATION LIST DOCUMENTED IN MEDICAL RECORD: ICD-10-PCS | Mod: CPTII,S$GLB,, | Performed by: OBSTETRICS & GYNECOLOGY

## 2023-05-11 PROCEDURE — 3008F PR BODY MASS INDEX (BMI) DOCUMENTED: ICD-10-PCS | Mod: CPTII,S$GLB,, | Performed by: OBSTETRICS & GYNECOLOGY

## 2023-05-11 PROCEDURE — 4010F PR ACE/ARB THEARPY RXD/TAKEN: ICD-10-PCS | Mod: CPTII,S$GLB,, | Performed by: OBSTETRICS & GYNECOLOGY

## 2023-05-11 PROCEDURE — 1101F PR PT FALLS ASSESS DOC 0-1 FALLS W/OUT INJ PAST YR: ICD-10-PCS | Mod: CPTII,S$GLB,, | Performed by: OBSTETRICS & GYNECOLOGY

## 2023-05-11 PROCEDURE — 1126F AMNT PAIN NOTED NONE PRSNT: CPT | Mod: CPTII,S$GLB,, | Performed by: OBSTETRICS & GYNECOLOGY

## 2023-05-11 PROCEDURE — 1160F PR REVIEW ALL MEDS BY PRESCRIBER/CLIN PHARMACIST DOCUMENTED: ICD-10-PCS | Mod: CPTII,S$GLB,, | Performed by: OBSTETRICS & GYNECOLOGY

## 2023-05-11 PROCEDURE — 81514 NFCT DS BV&VAGINITIS DNA ALG: CPT | Performed by: OBSTETRICS & GYNECOLOGY

## 2023-05-11 PROCEDURE — 3074F SYST BP LT 130 MM HG: CPT | Mod: CPTII,S$GLB,, | Performed by: OBSTETRICS & GYNECOLOGY

## 2023-05-11 PROCEDURE — 3078F DIAST BP <80 MM HG: CPT | Mod: CPTII,S$GLB,, | Performed by: OBSTETRICS & GYNECOLOGY

## 2023-05-11 PROCEDURE — 3288F PR FALLS RISK ASSESSMENT DOCUMENTED: ICD-10-PCS | Mod: CPTII,S$GLB,, | Performed by: OBSTETRICS & GYNECOLOGY

## 2023-05-11 PROCEDURE — 99999 PR PBB SHADOW E&M-EST. PATIENT-LVL III: CPT | Mod: PBBFAC,,, | Performed by: OBSTETRICS & GYNECOLOGY

## 2023-05-11 PROCEDURE — 99999 PR PBB SHADOW E&M-EST. PATIENT-LVL III: ICD-10-PCS | Mod: PBBFAC,,, | Performed by: OBSTETRICS & GYNECOLOGY

## 2023-05-11 PROCEDURE — 87624 HPV HI-RISK TYP POOLED RSLT: CPT | Performed by: OBSTETRICS & GYNECOLOGY

## 2023-05-11 PROCEDURE — 88175 CYTOPATH C/V AUTO FLUID REDO: CPT | Performed by: OBSTETRICS & GYNECOLOGY

## 2023-05-11 PROCEDURE — 1157F PR ADVANCE CARE PLAN OR EQUIV PRESENT IN MEDICAL RECORD: ICD-10-PCS | Mod: CPTII,S$GLB,, | Performed by: OBSTETRICS & GYNECOLOGY

## 2023-05-11 RX ORDER — ESTRADIOL 0.1 MG/G
1 CREAM VAGINAL
Qty: 42 G | Refills: 3 | Status: SHIPPED | OUTPATIENT
Start: 2023-05-11 | End: 2024-05-10

## 2023-05-11 NOTE — PROGRESS NOTES
Subjective:       Patient ID: Nga Livingston is a 67 y.o. female.    Chief Complaint:  Vaginitis, Gynecologic Exam, Vaginal Itching, and Vaginal Discharge      History of Present Illness  Gynecologic Exam  The patient's primary symptoms include vaginal discharge. Associated symptoms include frequency. Pertinent negatives include no abdominal pain, back pain or headaches.   Vaginal Itching  The patient's primary symptoms include vaginal discharge. Associated symptoms include frequency. Pertinent negatives include no abdominal pain, back pain or headaches.   Vaginal Discharge  The patient's primary symptoms include vaginal discharge. Associated symptoms include frequency. Pertinent negatives include no abdominal pain, back pain or headaches.     Nga Livingston is a 67 y.o. female  here for her  GYN exam with complaints of vaginal itching and discharge for the past week. She has not been seen since .    She is menopausal since age 51, and is not on HRT.   denies break through bleeding.   reports vaginal itching or irritation.  Reports vaginal discharge.  She is not sexually active.    History of abnormal pap: No  Last Pap: approximate date  and was normal  Last MMG: normal-3-7-2023-routine follow-up in 12 months  Last Colonoscopy:  colonoscopy 4 years ago with abnormalities.  denies domestic violence. She does feel safe at home.     Past Medical History:   Diagnosis Date    Depression     High blood cholesterol     Hypertension     Osteoporosis 3/26/15    outside records from [facility] - T score of L2 is -2.5     Past Surgical History:   Procedure Laterality Date    CYSTOSCOPY      ESOPHAGOGASTRODUODENOSCOPY N/A 2018    Procedure: ESOPHAGOGASTRODUODENOSCOPY (EGD);  Surgeon: Jordan Kraft MD;  Location: [address];  Service: Endoscopy;  Laterality: N/A;    TUBAL LIGATION       Social History     Socioeconomic History    Marital status: Single   Occupational History     "Occupation: retired teacher   Tobacco Use    Smoking status: Former     Packs/day: 0.30     Years: 10.00     Pack years: 3.00     Types: Cigarettes     Quit date: 2000     Years since quittin.1    Smokeless tobacco: Never   Substance and Sexual Activity    Alcohol use: Yes     Alcohol/week: 2.0 standard drinks     Types: 2 Glasses of wine per week    Drug use: No    Sexual activity: Not Currently     Partners: Male     Comment: Has not been sexually active in over 10 years     Family History   Problem Relation Age of Onset    Hypertension Mother     Stroke Mother     Hypothyroidism Mother     Diabetes Sister     Hypothyroidism Sister     Breast cancer Sister     Cancer Sister         bone cancer    Hypothyroidism Maternal Aunt     Diabetes Maternal Uncle     Hypothyroidism Maternal Uncle     Breast cancer Paternal Aunt     Breast cancer Maternal Cousin     Breast cancer Maternal Cousin     Colon cancer Neg Hx      OB History          2    Para        Term   0            AB   2    Living             SAB        IAB   2    Ectopic        Multiple        Live Births                     /76   Ht 5' 3" (1.6 m)   Wt 73.8 kg (162 lb 12.8 oz)   LMP 2007   BMI 28.84 kg/m²         GYN & OB History  Patient's last menstrual period was 2007.   Date of Last Pap: 2020    OB History    Para Term  AB Living   2   0   2     SAB IAB Ectopic Multiple Live Births     2            # Outcome Date GA Lbr Renny/2nd Weight Sex Delivery Anes PTL Lv   2 IAB            1 IAB                Review of Systems  Review of Systems   Constitutional:  Negative for activity change, appetite change, fatigue and unexpected weight change.   HENT: Negative.     Eyes:  Negative for visual disturbance.   Respiratory:  Negative for shortness of breath and wheezing.    Cardiovascular:  Negative for chest pain, palpitations and leg swelling.   Gastrointestinal:  Negative for abdominal pain, bloating " and blood in stool.   Endocrine: Negative for diabetes and hair loss.   Genitourinary:  Positive for bladder incontinence, frequency, vaginal discharge and vaginal dryness. Negative for decreased libido, dyspareunia, hot flashes and postmenopausal bleeding.   Musculoskeletal:  Negative for back pain and joint swelling.   Integumentary:  Negative for acne, hair changes and nipple discharge.   Neurological:  Negative for headaches.   Hematological:  Does not bruise/bleed easily.   Psychiatric/Behavioral:  Negative for depression and sleep disturbance. The patient is not nervous/anxious.    Breast: Negative for mastodynia and nipple discharge        Objective:      Physical Exam:   Constitutional: She is oriented to person, place, and time. She appears well-developed and well-nourished.    HENT:   Head: Normocephalic and atraumatic.    Eyes: Pupils are equal, round, and reactive to light. EOM are normal.     Cardiovascular:  Normal rate and regular rhythm.             Pulmonary/Chest: Effort normal and breath sounds normal.        Abdominal: Soft. Bowel sounds are normal.     Genitourinary:    Pelvic exam was performed with patient supine.      Genitourinary Comments: PELVIC: Normal external genitalia without lesions.  Normal hair distribution.  Adequate perineal body, normal urethral meatus.  Vagina  Dry and poorly rugated, atrophic, without lesions or discharge.  Cervix pink, without lesions, discharge or tenderness. Stage 2 cystocele and rectocele.  Bimanual exam shows uterus to be normal size, regular, mobile and nontender.  Adnexa without masses or tenderness.    RECTAL:Deferred                 Musculoskeletal: Normal range of motion and moves all extremeties.       Neurological: She is alert and oriented to person, place, and time.    Skin: Skin is warm and dry.    Psychiatric: She has a normal mood and affect.            Assessment:        1. Encounter for gynecological examination without abnormal finding    2.  Vaginal itching    3. Pap smear for cervical cancer screening    4. Vaginal dryness                Plan:        Problem List Items Addressed This Visit    None  Visit Diagnoses       Encounter for gynecological examination without abnormal finding    -  Primary    Vaginal itching        Relevant Orders    Vaginosis Screen by DNA Probe    Pap smear for cervical cancer screening        Relevant Orders    Liquid-Based Pap Smear, Screening    HPV High Risk Genotypes, PCR    Vaginal dryness        Relevant Medications    estradioL (ESTRACE) 0.01 % (0.1 mg/gram) vaginal cream             Follow up in about 2 years (around 5/11/2025).

## 2023-05-12 LAB
BACTERIAL VAGINOSIS DNA: NEGATIVE
CANDIDA GLABRATA DNA: NEGATIVE
CANDIDA KRUSEI DNA: NEGATIVE
CANDIDA RRNA VAG QL PROBE: NEGATIVE
T VAGINALIS RRNA GENITAL QL PROBE: NEGATIVE

## 2023-05-15 ENCOUNTER — TELEPHONE (OUTPATIENT)
Dept: OBSTETRICS AND GYNECOLOGY | Facility: CLINIC | Age: 68
End: 2023-05-15
Payer: MEDICARE

## 2023-05-15 NOTE — TELEPHONE ENCOUNTER
----- Message from Isha Villareal MD sent at 5/15/2023  7:41 AM CDT -----  Please let her know that her vaginal swab was negative for infection.

## 2023-05-15 NOTE — TELEPHONE ENCOUNTER
Called patient to inform that vaginal swabs where negative for any infections. Patient said thanks for calling.

## 2023-07-06 ENCOUNTER — LAB VISIT (OUTPATIENT)
Dept: LAB | Facility: HOSPITAL | Age: 68
End: 2023-07-06
Attending: INTERNAL MEDICINE
Payer: MEDICARE

## 2023-07-06 DIAGNOSIS — I10 BENIGN ESSENTIAL HYPERTENSION: Chronic | ICD-10-CM

## 2023-07-06 DIAGNOSIS — E78.5 HYPERLIPIDEMIA, UNSPECIFIED HYPERLIPIDEMIA TYPE: Chronic | ICD-10-CM

## 2023-07-06 DIAGNOSIS — I70.0 AORTIC ATHEROSCLEROSIS: ICD-10-CM

## 2023-07-06 LAB
ALBUMIN SERPL BCP-MCNC: 4 G/DL (ref 3.5–5.2)
ALP SERPL-CCNC: 75 U/L (ref 55–135)
ALT SERPL W/O P-5'-P-CCNC: 23 U/L (ref 10–44)
ANION GAP SERPL CALC-SCNC: 7 MMOL/L (ref 8–16)
AST SERPL-CCNC: 21 U/L (ref 10–40)
BASOPHILS # BLD AUTO: 0.02 K/UL (ref 0–0.2)
BASOPHILS NFR BLD: 0.5 % (ref 0–1.9)
BILIRUB SERPL-MCNC: 0.6 MG/DL (ref 0.1–1)
BUN SERPL-MCNC: 12 MG/DL (ref 8–23)
CALCIUM SERPL-MCNC: 9.2 MG/DL (ref 8.7–10.5)
CHLORIDE SERPL-SCNC: 105 MMOL/L (ref 95–110)
CHOLEST SERPL-MCNC: 126 MG/DL (ref 120–199)
CHOLEST/HDLC SERPL: 3.4 {RATIO} (ref 2–5)
CO2 SERPL-SCNC: 28 MMOL/L (ref 23–29)
CREAT SERPL-MCNC: 0.8 MG/DL (ref 0.5–1.4)
DIFFERENTIAL METHOD: ABNORMAL
EOSINOPHIL # BLD AUTO: 0.1 K/UL (ref 0–0.5)
EOSINOPHIL NFR BLD: 1.7 % (ref 0–8)
ERYTHROCYTE [DISTWIDTH] IN BLOOD BY AUTOMATED COUNT: 13.8 % (ref 11.5–14.5)
EST. GFR  (NO RACE VARIABLE): >60 ML/MIN/1.73 M^2
GLUCOSE SERPL-MCNC: 103 MG/DL (ref 70–110)
HCT VFR BLD AUTO: 34.3 % (ref 37–48.5)
HDLC SERPL-MCNC: 37 MG/DL (ref 40–75)
HDLC SERPL: 29.4 % (ref 20–50)
HGB BLD-MCNC: 11.2 G/DL (ref 12–16)
IMM GRANULOCYTES # BLD AUTO: 0.01 K/UL (ref 0–0.04)
IMM GRANULOCYTES NFR BLD AUTO: 0.2 % (ref 0–0.5)
LDLC SERPL CALC-MCNC: 72.4 MG/DL (ref 63–159)
LYMPHOCYTES # BLD AUTO: 1.3 K/UL (ref 1–4.8)
LYMPHOCYTES NFR BLD: 32.2 % (ref 18–48)
MCH RBC QN AUTO: 30.7 PG (ref 27–31)
MCHC RBC AUTO-ENTMCNC: 32.7 G/DL (ref 32–36)
MCV RBC AUTO: 94 FL (ref 82–98)
MONOCYTES # BLD AUTO: 0.5 K/UL (ref 0.3–1)
MONOCYTES NFR BLD: 11.1 % (ref 4–15)
NEUTROPHILS # BLD AUTO: 2.2 K/UL (ref 1.8–7.7)
NEUTROPHILS NFR BLD: 54.3 % (ref 38–73)
NONHDLC SERPL-MCNC: 89 MG/DL
NRBC BLD-RTO: 0 /100 WBC
PLATELET # BLD AUTO: 206 K/UL (ref 150–450)
PMV BLD AUTO: 9.4 FL (ref 9.2–12.9)
POTASSIUM SERPL-SCNC: 3.9 MMOL/L (ref 3.5–5.1)
PROT SERPL-MCNC: 7.1 G/DL (ref 6–8.4)
RBC # BLD AUTO: 3.65 M/UL (ref 4–5.4)
SODIUM SERPL-SCNC: 140 MMOL/L (ref 136–145)
TRIGL SERPL-MCNC: 83 MG/DL (ref 30–150)
TSH SERPL DL<=0.005 MIU/L-ACNC: 0.94 UIU/ML (ref 0.4–4)
WBC # BLD AUTO: 4.04 K/UL (ref 3.9–12.7)

## 2023-07-06 PROCEDURE — 80053 COMPREHEN METABOLIC PANEL: CPT | Performed by: INTERNAL MEDICINE

## 2023-07-06 PROCEDURE — 80061 LIPID PANEL: CPT | Performed by: INTERNAL MEDICINE

## 2023-07-06 PROCEDURE — 84443 ASSAY THYROID STIM HORMONE: CPT | Performed by: INTERNAL MEDICINE

## 2023-07-06 PROCEDURE — 85025 COMPLETE CBC W/AUTO DIFF WBC: CPT | Performed by: INTERNAL MEDICINE

## 2023-07-06 PROCEDURE — 36415 COLL VENOUS BLD VENIPUNCTURE: CPT | Performed by: INTERNAL MEDICINE

## 2023-07-11 ENCOUNTER — OFFICE VISIT (OUTPATIENT)
Dept: PRIMARY CARE CLINIC | Facility: CLINIC | Age: 68
End: 2023-07-11
Payer: MEDICARE

## 2023-07-11 VITALS
OXYGEN SATURATION: 98 % | BODY MASS INDEX: 28.79 KG/M2 | HEIGHT: 63 IN | TEMPERATURE: 98 F | DIASTOLIC BLOOD PRESSURE: 54 MMHG | HEART RATE: 64 BPM | RESPIRATION RATE: 18 BRPM | SYSTOLIC BLOOD PRESSURE: 115 MMHG | WEIGHT: 162.5 LBS

## 2023-07-11 DIAGNOSIS — D64.9 NORMOCYTIC ANEMIA: ICD-10-CM

## 2023-07-11 DIAGNOSIS — E78.5 HYPERLIPIDEMIA, UNSPECIFIED HYPERLIPIDEMIA TYPE: ICD-10-CM

## 2023-07-11 DIAGNOSIS — R05.3 CHRONIC COUGH: Primary | ICD-10-CM

## 2023-07-11 DIAGNOSIS — I10 BENIGN ESSENTIAL HYPERTENSION: ICD-10-CM

## 2023-07-11 PROCEDURE — 1159F PR MEDICATION LIST DOCUMENTED IN MEDICAL RECORD: ICD-10-PCS | Mod: CPTII,S$GLB,, | Performed by: INTERNAL MEDICINE

## 2023-07-11 PROCEDURE — 1157F PR ADVANCE CARE PLAN OR EQUIV PRESENT IN MEDICAL RECORD: ICD-10-PCS | Mod: CPTII,S$GLB,, | Performed by: INTERNAL MEDICINE

## 2023-07-11 PROCEDURE — 1160F RVW MEDS BY RX/DR IN RCRD: CPT | Mod: CPTII,S$GLB,, | Performed by: INTERNAL MEDICINE

## 2023-07-11 PROCEDURE — 99999 PR PBB SHADOW E&M-EST. PATIENT-LVL IV: CPT | Mod: PBBFAC,,, | Performed by: INTERNAL MEDICINE

## 2023-07-11 PROCEDURE — 3078F PR MOST RECENT DIASTOLIC BLOOD PRESSURE < 80 MM HG: ICD-10-PCS | Mod: CPTII,S$GLB,, | Performed by: INTERNAL MEDICINE

## 2023-07-11 PROCEDURE — 1126F AMNT PAIN NOTED NONE PRSNT: CPT | Mod: CPTII,S$GLB,, | Performed by: INTERNAL MEDICINE

## 2023-07-11 PROCEDURE — 3008F PR BODY MASS INDEX (BMI) DOCUMENTED: ICD-10-PCS | Mod: CPTII,S$GLB,, | Performed by: INTERNAL MEDICINE

## 2023-07-11 PROCEDURE — 1101F PR PT FALLS ASSESS DOC 0-1 FALLS W/OUT INJ PAST YR: ICD-10-PCS | Mod: CPTII,S$GLB,, | Performed by: INTERNAL MEDICINE

## 2023-07-11 PROCEDURE — 1159F MED LIST DOCD IN RCRD: CPT | Mod: CPTII,S$GLB,, | Performed by: INTERNAL MEDICINE

## 2023-07-11 PROCEDURE — 4010F PR ACE/ARB THEARPY RXD/TAKEN: ICD-10-PCS | Mod: CPTII,S$GLB,, | Performed by: INTERNAL MEDICINE

## 2023-07-11 PROCEDURE — 4010F ACE/ARB THERAPY RXD/TAKEN: CPT | Mod: CPTII,S$GLB,, | Performed by: INTERNAL MEDICINE

## 2023-07-11 PROCEDURE — 99214 OFFICE O/P EST MOD 30 MIN: CPT | Mod: S$GLB,,, | Performed by: INTERNAL MEDICINE

## 2023-07-11 PROCEDURE — 99999 PR PBB SHADOW E&M-EST. PATIENT-LVL IV: ICD-10-PCS | Mod: PBBFAC,,, | Performed by: INTERNAL MEDICINE

## 2023-07-11 PROCEDURE — 3288F FALL RISK ASSESSMENT DOCD: CPT | Mod: CPTII,S$GLB,, | Performed by: INTERNAL MEDICINE

## 2023-07-11 PROCEDURE — 3074F PR MOST RECENT SYSTOLIC BLOOD PRESSURE < 130 MM HG: ICD-10-PCS | Mod: CPTII,S$GLB,, | Performed by: INTERNAL MEDICINE

## 2023-07-11 PROCEDURE — 3074F SYST BP LT 130 MM HG: CPT | Mod: CPTII,S$GLB,, | Performed by: INTERNAL MEDICINE

## 2023-07-11 PROCEDURE — 3078F DIAST BP <80 MM HG: CPT | Mod: CPTII,S$GLB,, | Performed by: INTERNAL MEDICINE

## 2023-07-11 PROCEDURE — 1160F PR REVIEW ALL MEDS BY PRESCRIBER/CLIN PHARMACIST DOCUMENTED: ICD-10-PCS | Mod: CPTII,S$GLB,, | Performed by: INTERNAL MEDICINE

## 2023-07-11 PROCEDURE — 1126F PR PAIN SEVERITY QUANTIFIED, NO PAIN PRESENT: ICD-10-PCS | Mod: CPTII,S$GLB,, | Performed by: INTERNAL MEDICINE

## 2023-07-11 PROCEDURE — 3008F BODY MASS INDEX DOCD: CPT | Mod: CPTII,S$GLB,, | Performed by: INTERNAL MEDICINE

## 2023-07-11 PROCEDURE — 99214 PR OFFICE/OUTPT VISIT, EST, LEVL IV, 30-39 MIN: ICD-10-PCS | Mod: S$GLB,,, | Performed by: INTERNAL MEDICINE

## 2023-07-11 PROCEDURE — 1101F PT FALLS ASSESS-DOCD LE1/YR: CPT | Mod: CPTII,S$GLB,, | Performed by: INTERNAL MEDICINE

## 2023-07-11 PROCEDURE — 3288F PR FALLS RISK ASSESSMENT DOCUMENTED: ICD-10-PCS | Mod: CPTII,S$GLB,, | Performed by: INTERNAL MEDICINE

## 2023-07-11 PROCEDURE — 1157F ADVNC CARE PLAN IN RCRD: CPT | Mod: CPTII,S$GLB,, | Performed by: INTERNAL MEDICINE

## 2023-07-11 RX ORDER — IPRATROPIUM BROMIDE 21 UG/1
1 SPRAY, METERED NASAL 2 TIMES DAILY
Qty: 30 ML | Refills: 4 | Status: SHIPPED | OUTPATIENT
Start: 2023-07-11 | End: 2024-01-30

## 2023-07-11 NOTE — PROGRESS NOTES
"Subjective:       Patient ID: Nga Livingston is a 67 y.o. female.    Chief Complaint: HTN f/u    HPI  Reports chronic cough intermittently about every other day - feels like has to clear throat. Does have chronic sinus issues. No fevers/chills. Takes zyrtec maybe 3x/week. Reports cough better on days she does take zyrtec. Using nasacort 2 sprays each nostril about 2x/week. Reports that helps. Cough is nonproductive. No wheezing or SOB.   Still exercising at least 4-5 times a week. That does not make her cough. Cough is usually during the day. No issues w/ reflux - no sour taste/dysphagia/CP/burning sensation.     HTN - olmesartan 20mg qd, coreg 25mg BID, HCTZ 12.5mg qd.  BP has been doing well at home.     HLD - crestor 40mg qhs  Family h/o CVA.   Aortoiliac plaque on imaging.   LDL 7/6/23 72.4.     Chronic normocytic anemia unchanged. Most recent Hgb 11.2. B12 and folate 2019 wnl. Ferritin and iron 2018 wnl.    Review of Systems   Constitutional:  Negative for chills and fever.   HENT:  Negative for congestion, postnasal drip and rhinorrhea.    Eyes:  Negative for visual disturbance.   Respiratory:  Positive for cough. Negative for shortness of breath and wheezing.    Cardiovascular:  Negative for chest pain, palpitations and leg swelling.   Gastrointestinal:  Positive for constipation (started taking metamucil). Negative for abdominal pain, blood in stool, diarrhea, nausea and vomiting.   Genitourinary:  Negative for dysuria and frequency.   Musculoskeletal: Negative.    Skin:  Negative for rash.   Neurological:  Negative for dizziness, light-headedness, numbness and headaches.   Psychiatric/Behavioral:  Negative for dysphoric mood. The patient is not nervous/anxious.        Objective:      Physical Exam    BP (!) 115/54 (BP Location: Right arm, Patient Position: Sitting, BP Method: Medium (Automatic))   Pulse 64   Temp 98.1 °F (36.7 °C) (Oral)   Resp 18   Ht 5' 3" (1.6 m)   Wt 73.7 kg (162 lb 7.7 oz)   " LMP 03/22/2007   SpO2 98%   BMI 28.78 kg/m²     GEN - A+OX4, NAD   HEENT - PERRL, EOMI, OP clear. MMM. TM normal.   Neck - No cervical LAD.   CV - RRR, no m/r   Chest - CTAB, no wheezing or rhonchi  Abd - S/NT/ND/+BS.   Ext - 2+BDP and radial pulses. No C/C/E.  Neuro - 5/5 BUE and BLE strength. 2+ DTRs.   MSK - no spinal tenderness to palpation.  Skin - No rash.    Previous labs reviewed.     Assessment/Plan     Diagnoses and all orders for this visit:    Chronic cough - start atrovent nasal spray daily till f/u appt w/ me in 4 weeks.   -     ipratropium (ATROVENT) 21 mcg (0.03 %) nasal spray; 1 spray by Each Nostril route 2 (two) times daily.    Benign essential hypertension - Stable and controlled. Continue current medications.    Hyperlipidemia, unspecified hyperlipidemia type - cont crestor 40.     Normocytic anemia - chronic and stable.       Follow up in about 4 weeks (around 8/8/2023) for Virtual Visit.      Elaine Mahmood MD  Department of Internal Medicine - [facility]  8:26 AM

## 2023-08-08 ENCOUNTER — OFFICE VISIT (OUTPATIENT)
Dept: PRIMARY CARE CLINIC | Facility: CLINIC | Age: 68
End: 2023-08-08
Payer: MEDICARE

## 2023-08-08 DIAGNOSIS — R05.3 CHRONIC COUGH: Primary | ICD-10-CM

## 2023-08-08 PROCEDURE — 99499 UNLISTED E&M SERVICE: CPT | Mod: 95,,, | Performed by: INTERNAL MEDICINE

## 2023-08-08 PROCEDURE — 99499 NO LOS: ICD-10-PCS | Mod: 95,,, | Performed by: INTERNAL MEDICINE

## 2023-08-08 NOTE — PROGRESS NOTES
Established Patient - Audio Only Telehealth Visit     The patient location is: [place], [place]  The chief complaint leading to consultation is: cough  Visit type: Virtual visit with audio only (telephone)  Total time spent with patient: 5 min        The reason for the audio only service rather than synchronous audio and video virtual visit was related to technical difficulties or patient preference/necessity.     Each patient to whom I provide medical services by telemedicine is:  (1) informed of the relationship between the physician and patient and the respective role of any other health care provider with respect to management of the patient; and (2) notified that they may decline to receive medical services by telemedicine and may withdraw from such care at any time. Patient verbally consented to receive this service via voice-only telephone call.       HPI: chronic cough - at our last visit started on atrovent in place of nasacort. Feels like the atrovent nasal spray works better than the nasacort. Still w/ occ cough but immensely improved. Has refills on meds. Non productive. No fevers/chills.      Assessment and plan:    Diagnoses and all orders for this visit:    Chronic cough    Cont atrovent.      Elaine Mahmodo MD  Department of Internal Medicine - [facility]  11:31 AM         This service was not originating from a related E/M service provided within the previous 7 days nor will  to an E/M service or procedure within the next 24 hours or my soonest available appointment.  Prevailing standard of care was able to be met in this audio-only visit.

## 2024-01-10 ENCOUNTER — PATIENT MESSAGE (OUTPATIENT)
Dept: ADMINISTRATIVE | Facility: HOSPITAL | Age: 69
End: 2024-01-10
Payer: MEDICARE

## 2024-01-29 DIAGNOSIS — R05.3 CHRONIC COUGH: ICD-10-CM

## 2024-01-30 RX ORDER — IPRATROPIUM BROMIDE 21 UG/1
1 SPRAY, METERED NASAL 2 TIMES DAILY
Qty: 31 ML | Refills: 5 | Status: SHIPPED | OUTPATIENT
Start: 2024-01-30

## 2024-02-06 ENCOUNTER — TELEPHONE (OUTPATIENT)
Dept: PRIMARY CARE CLINIC | Facility: CLINIC | Age: 69
End: 2024-02-06
Payer: MEDICARE

## 2024-02-06 NOTE — TELEPHONE ENCOUNTER
Spoke w/ pt. She wanted to express concern regarding testing for covid, and having two different results today. Pt stated she did have Covid w/ symptoms of fever, chills and body aches on 1/27. Pt states she no longer has any of these symptoms.  Advised pt to mask and do hand washing, report any new onset of fever, chills or any flu like symptoms to PCP.

## 2024-02-06 NOTE — TELEPHONE ENCOUNTER
----- Message from Leydamargi Quiñonez sent at 2/6/2024  1:11 PM CST -----  Contact: Patient @ [phone]  1MEDICALADVICE     Patient is calling for Medical Advice regarding:Covid Patient Positive / Coughing/Chest congestion/chills. Retested this AM 02/6/2024 - [facility] is positive but her home test 2 of them was negative     How long has patient had these symptoms:01/27/2024    Pharmacy name and phone#:    Would like response via [employer]:  CALL      Comments:

## 2024-02-23 ENCOUNTER — TELEPHONE (OUTPATIENT)
Dept: PRIMARY CARE CLINIC | Facility: CLINIC | Age: 69
End: 2024-02-23
Payer: MEDICARE

## 2024-02-23 DIAGNOSIS — Z78.0 POSTMENOPAUSAL ESTROGEN DEFICIENCY: Primary | ICD-10-CM

## 2024-02-23 NOTE — TELEPHONE ENCOUNTER
----- Message from Adelfo Rivera sent at 2/23/2024  9:24 AM CST -----  Contact: pt @ [phone]  1MEDICALADVICE     Patient is calling for Medical Advice regarding: pt would like to know about getting scheduled to have bone density testing or is it over due, please advise     How long has patient had these symptoms:    Pharmacy name and phone#:    Would like response via [employer]:  call    Comments:

## 2024-03-19 ENCOUNTER — HOSPITAL ENCOUNTER (OUTPATIENT)
Dept: RADIOLOGY | Facility: HOSPITAL | Age: 69
Discharge: HOME | End: 2024-03-19
Attending: INTERNAL MEDICINE
Payer: MEDICARE

## 2024-03-19 DIAGNOSIS — Z12.31 ENCOUNTER FOR SCREENING MAMMOGRAM FOR BREAST CANCER: ICD-10-CM

## 2024-03-19 PROCEDURE — 77067 SCR MAMMO BI INCL CAD: CPT | Mod: TC

## 2024-03-19 PROCEDURE — 77063 BREAST TOMOSYNTHESIS BI: CPT | Mod: 26,,, | Performed by: RADIOLOGY

## 2024-03-19 PROCEDURE — 77067 SCR MAMMO BI INCL CAD: CPT | Mod: 26,,, | Performed by: RADIOLOGY

## 2024-03-26 ENCOUNTER — HOSPITAL ENCOUNTER (OUTPATIENT)
Dept: RADIOLOGY | Facility: CLINIC | Age: 69
Discharge: HOME | End: 2024-03-26
Attending: INTERNAL MEDICINE
Payer: MEDICARE

## 2024-03-26 DIAGNOSIS — Z78.0 POSTMENOPAUSAL ESTROGEN DEFICIENCY: ICD-10-CM

## 2024-03-26 PROCEDURE — 77080 DXA BONE DENSITY AXIAL: CPT | Mod: 26,,, | Performed by: INTERNAL MEDICINE

## 2024-03-26 PROCEDURE — 77080 DXA BONE DENSITY AXIAL: CPT | Mod: TC,PO

## 2024-03-27 ENCOUNTER — TELEPHONE (OUTPATIENT)
Dept: PRIMARY CARE CLINIC | Facility: CLINIC | Age: 69
End: 2024-03-27
Payer: MEDICARE

## 2024-03-27 DIAGNOSIS — M81.0 SENILE OSTEOPOROSIS: Primary | ICD-10-CM

## 2024-03-27 RX ORDER — ALENDRONATE SODIUM 70 MG/1
70 TABLET ORAL
Qty: 12 TABLET | Refills: 3 | Status: SHIPPED | OUTPATIENT
Start: 2024-03-27 | End: 2024-04-01 | Stop reason: SDUPTHER

## 2024-03-27 NOTE — TELEPHONE ENCOUNTER
Please call and notify pt:  Bone density scan with worsening bone density compared to 2017 and since stopping fosamax. Let's restart it.

## 2024-04-01 RX ORDER — ALENDRONATE SODIUM 70 MG/1
70 TABLET ORAL
Qty: 12 TABLET | Refills: 3 | Status: SHIPPED | OUTPATIENT
Start: 2024-04-01 | End: 2025-04-01

## 2024-04-10 ENCOUNTER — TELEPHONE (OUTPATIENT)
Dept: PRIMARY CARE CLINIC | Facility: CLINIC | Age: 69
End: 2024-04-10
Payer: MEDICARE

## 2024-04-10 NOTE — TELEPHONE ENCOUNTER
----- Message from Laminfior Ad sent at 4/10/2024 11:32 AM CDT -----  Contact: [phone]  1MEDICALADVICE     Patient is calling for Medical Advice regarding: PT WOULD LIKE A CALL BACK PT SAYS SHE HAS BEEN TAKING THE RX  AND HAS NOTICE SHE HAS BENNE TAKING IT WRONG PLEASE ADVISE     How long has patient had these symptoms:    Pharmacy name and phone#:    Would like response via [employer]:  CALL BACK     Comments:

## 2024-04-10 NOTE — TELEPHONE ENCOUNTER
Pt states that she has been taking Fosamax 70mg DAILY instead of WEEKLY since 4/1. Reports she did have some hoarseness. States no trouble swallowing, no stomach pain.   Please advise further.

## 2024-04-10 NOTE — TELEPHONE ENCOUNTER
Spoke with Pharmacist Eduardo Mahmood and [place]. Advised to monitor patient for dysphagia, pain, GERD symptoms.   I called pt, she denies all of these. She mentions she has hoarseness that comes and goes but also has a post nasal drip, and right arm pain.   She was advised if those symptoms do occur and are severe to go to the ER. Otherwise call us with any changes.

## 2024-06-17 NOTE — PATIENT INSTRUCTIONS
Punch Biopsy Wound Care    Your doctor has performed a punch biopsy today.  A band aid and antibiotic ointment has been placed over the site.  This should remain in place for 24 hours.  It is recommended that you keep the area dry for the first 24 hours.  After 24 hours, you may remove the band aid and wash the area with warm soap and water and apply Vaseline jelly.  Many patients prefer to use Neosporin or Bacitracin ointment.  This is acceptable; however know that you can develop an allergy to this medication even if you have used it safely for years.  It is important to keep the area moist.  Letting it dry out and get air slows healing time, will worsen the scar, and make it more difficult to remove the stitches if they were placed.  Band aid is optional after first 24 hours.      If you notice increasing redness, tenderness, pain, or yellow drainage at the biopsy or surgical site, please notify your doctor.  These are signs of an infection.    If your biopsy/surgical site is bleeding, apply firm pressure for 15 minutes straight.  Repeat for another 15 minutes, if it is still bleeding.   If the surgical site continues to bleed, then please contact your doctor.      [address] [place] [address]/ [phone] FAX/ [email or web address]          Ambulatory

## 2024-08-13 ENCOUNTER — TELEPHONE (OUTPATIENT)
Dept: PRIMARY CARE CLINIC | Facility: CLINIC | Age: 69
End: 2024-08-13
Payer: MEDICARE

## 2024-10-04 ENCOUNTER — OFFICE VISIT (OUTPATIENT)
Dept: PRIMARY CARE CLINIC | Facility: CLINIC | Age: 69
End: 2024-10-04
Payer: MEDICARE

## 2024-10-04 VITALS
TEMPERATURE: 98 F | HEIGHT: 63 IN | HEART RATE: 61 BPM | DIASTOLIC BLOOD PRESSURE: 68 MMHG | WEIGHT: 159.63 LBS | OXYGEN SATURATION: 97 % | BODY MASS INDEX: 28.29 KG/M2 | SYSTOLIC BLOOD PRESSURE: 118 MMHG

## 2024-10-04 DIAGNOSIS — E78.5 HYPERLIPIDEMIA, UNSPECIFIED HYPERLIPIDEMIA TYPE: ICD-10-CM

## 2024-10-04 DIAGNOSIS — Z87.891 FORMER SMOKER: ICD-10-CM

## 2024-10-04 DIAGNOSIS — I70.0 AORTIC ATHEROSCLEROSIS: ICD-10-CM

## 2024-10-04 DIAGNOSIS — I10 BENIGN ESSENTIAL HYPERTENSION: ICD-10-CM

## 2024-10-04 DIAGNOSIS — R73.02 IGT (IMPAIRED GLUCOSE TOLERANCE): ICD-10-CM

## 2024-10-04 DIAGNOSIS — D69.2 SENILE PURPURA: ICD-10-CM

## 2024-10-04 DIAGNOSIS — K63.5 POLYP OF COLON, UNSPECIFIED PART OF COLON, UNSPECIFIED TYPE: ICD-10-CM

## 2024-10-04 DIAGNOSIS — H04.203 WATERY EYES: ICD-10-CM

## 2024-10-04 DIAGNOSIS — G89.29 CHRONIC LEFT SHOULDER PAIN: ICD-10-CM

## 2024-10-04 DIAGNOSIS — R05.8 DRY COUGH: ICD-10-CM

## 2024-10-04 DIAGNOSIS — M25.512 CHRONIC LEFT SHOULDER PAIN: ICD-10-CM

## 2024-10-04 DIAGNOSIS — M81.0 SENILE OSTEOPOROSIS: Primary | ICD-10-CM

## 2024-10-04 PROCEDURE — 99999 PR PBB SHADOW E&M-EST. PATIENT-LVL V: CPT | Mod: PBBFAC,,, | Performed by: INTERNAL MEDICINE

## 2024-10-04 RX ORDER — OMEPRAZOLE 40 MG/1
40 CAPSULE, DELAYED RELEASE ORAL DAILY
Qty: 90 CAPSULE | Refills: 3 | Status: SHIPPED | OUTPATIENT
Start: 2024-10-04 | End: 2025-10-04

## 2024-10-04 NOTE — PATIENT INSTRUCTIONS
Call you gastroenterologist to schedule your colonoscopy.   Flu vaccine and RSV vaccine at the local pharmacy.     Schedule labs for Monday at [facility] for 8am.     Physical therapy will call you to schedule an appointment for the shoulder.     Schedule your eye appointment.     Use Debrox over the counter to clean your ears. You can do a drop of mineral oil in each ear to help moisturize.

## 2024-10-04 NOTE — PROGRESS NOTES
INTERNAL MEDICINE ANNUAL VISIT NOTE      CHIEF COMPLAINT     ANNUAL    HPI     Nga Livingston is a 68 y.o. AA female who presents for annual.  MMG - 3/19/24 NEG  DEXA - 3/26/24 osteoporosis. On fosamax 70mg weekly. Was on drug holiday from 2017 to 2024. Restarted 4/2024. No issues on this medicine.   CSCOPE - 8/30/19 mild diverticulosis, 2mm polyp. Repeat in 5 yrs. Gets it at [facility].   Follows w/ Dr. Villareal 5/2023. Pap neg  Has been exercising at least 4-5x/day - treadmill for 30 min and and resistance training. No SOB/MOREIRA/CP/palpitation.   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     C/o L arm weakness x 2 mo - it's more so pain all over the L shoulder/upper arm when she's above a 90 degrees. Does not limit her exercise.     HTN - HCTZ 12.5mg qd, olmesartan 20mg qd, coreg 25mg daily.  Checks BP at home once a day. Usually 110s-120s/60s    Dry cough x 2 mo. Taking benadryl but doesn't help. Mucus in the throat and so it's a hacking cough at times. B ears have scab in it all the time - doesn't itch. No drainage. No fevers/chills. No pain in sinuses. Still using astelin. Sometimes when she's trying to chew her food, she can feel the food going down. No dysphagia.   No wheezing. Former smoker.   EGD 2018 -   Impression:           - Small hiatal hernia.                         - LA Grade A reflux esophagitis.                         - Benign-appearing esophageal stenosis. Dilated.                         - Normal stomach.                         - Normal examined duodenum.                         - No specimens collected.     HLD - crestor 40mg daily  CT urogram 2021 - The abdominal aorta tapers normally with mild atherosclerotic calcification.   LDL 7/6/23 72.4    IGT - due to repeat A1c.    Didn't remember hitting anything but there's a bruise at the left thigh. It's a little knot. Just realized it a few days ago.     Eyes are watery especially when she drives. Wears glasses.   Has dry eyes.   Needs f/u w/ eye  doctor.     Past Medical History:  Past Medical History:   Diagnosis Date    Depression     High blood cholesterol     Hypertension     Osteoporosis 3/26/15    outside records from [facility] - T score of L2 is -2.5       Past Surgical History:  Past Surgical History:   Procedure Laterality Date    CYSTOSCOPY      ESOPHAGOGASTRODUODENOSCOPY N/A 8/14/2018    Procedure: ESOPHAGOGASTRODUODENOSCOPY (EGD);  Surgeon: Jordan Kraft MD;  Location: [address]);  Service: Endoscopy;  Laterality: N/A;    TUBAL LIGATION         Allergies:  Review of patient's allergies indicates:   Allergen Reactions    Iodinated contrast media Diarrhea and Rash       Home Medications:  Prior to Admission medications    Medication Sig Start Date End Date Taking? Authorizing Provider   alendronate (FOSAMAX) 70 MG tablet Take 1 tablet (70 mg total) by mouth every 7 days. 4/1/24 4/1/25  Elaine Mahmood MD   aspirin 81 MG Chew Take 81 mg by mouth once daily.    Provider, Historical   carvediloL (COREG) 25 MG tablet TAKE 1 TABLET  2  TIMES DAILY WITH MEALS. 2/27/23   Elaine Mahmood MD   cetirizine (ZYRTEC) 10 MG tablet Take 10 mg by mouth once daily.    Provider, Historical   diphenhydrAMINE (BENADRYL) 25 mg capsule Take 25 mg by mouth nightly.    Provider, Historical   estradioL (ESTRACE) 0.01 % (0.1 mg/gram) vaginal cream Place 1 g vaginally twice a week. Use nightly x 2 weeks then twice weekly 5/11/23 5/10/24  Isha Villareal MD   fish oil-omega-3 fatty acids 300-1,000 mg capsule Take 2 g by mouth once daily.    Provider, Historical   hydroCHLOROthiazide (HYDRODIURIL) 12.5 MG Tab  9/4/20   Provider, Historical   ipratropium (ATROVENT) 21 mcg (0.03 %) nasal spray 1 spray by Each Nostril route 2 (two) times daily. 1/30/24   Elaine Mahmood MD   olmesartan (BENICAR) 20 MG tablet  8/18/20   Provider, Historical   RESTASIS 0.05 % ophthalmic emulsion Place 1 drop into both eyes as needed.  11/9/15   Provider, Historical   rosuvastatin (CRESTOR) 40 MG Tab Take  "1 tablet (40 mg total) by mouth every evening. 22   Elaine Mahmood MD   vitamin D 1000 units Tab Take 185 mg by mouth every other day.     Provider, Historical   vitamin E 100 UNIT capsule Take 100 Units by mouth once daily.    Provider, Historical       Family History:  Family History   Problem Relation Name Age of Onset    Hypertension Mother      Stroke Mother      Hypothyroidism Mother      Diabetes Sister      Hypothyroidism Sister      Breast cancer Sister      Cancer Sister          bone cancer    Hypothyroidism Maternal Aunt      Diabetes Maternal Uncle      Hypothyroidism Maternal Uncle      Breast cancer Paternal Aunt      Breast cancer Maternal Cousin      Breast cancer Maternal Cousin      Colon cancer Neg Hx         Social History:  Social History     Tobacco Use    Smoking status: Former     Current packs/day: 0.00     Average packs/day: 0.3 packs/day for 10.0 years (3.0 ttl pk-yrs)     Types: Cigarettes     Start date: 1990     Quit date: 2000     Years since quittin.5    Smokeless tobacco: Never   Substance Use Topics    Alcohol use: Yes     Alcohol/week: 2.0 standard drinks of alcohol     Types: 2 Glasses of wine per week    Drug use: No       Review of Systems:  Review of Systems Comprehensive review of systems otherwise negative. See history/subjective section for more details.    Health Maintainence:   HM reviewed.    PHYSICAL EXAM     /68   Pulse 61   Temp 98 °F (36.7 °C) (Oral)   Ht 5' 3" (1.6 m)   Wt 72.4 kg (159 lb 9.8 oz)   LMP 2007   SpO2 97%   BMI 28.27 kg/m²     GEN - A+OX4, NAD   HEENT - PERRL, EOMI, OP clear. MMM. TM normal. Dry ear canals.  Neck - No thyromegaly or cervical LAD. No thyroid masses felt.  CV - RRR, no m/r   Chest - CTAB, no wheezing or rhonchi  Abd - S/NT/ND/+BS.   Ext - 2+BDP and radial pulses. No LE edema.   Neuro - PERRL, EOMI, no nystagmus, eyebrow raise, facial sensation, hearing, m of mastication, smile, palatal raise, shoulder " shrug, tongue protrusion symmetric and intact. 5/5 BUE and BLE strength - pain on L shoulder abduction up to 90 degrees. Sensation to light touch intact throughout. 2+ DTRs. Normal gait.   MSK - No spinal tenderness to palpation. Normal gait.   Skin - No rash. L thigh bruise.    LABS     Previous labs reviewed.    ASSESSMENT/PLAN     Nga Livingston is a 68 y.o. female with  Nga was seen today for annual exam.    Diagnoses and all orders for this visit:    Senile osteoporosis - cont fosamax 70mg weekly. Cont resistance training but maybe avoid left shoulder weight bearing at  this time till PT eval.     Polyp of colon, unspecified part of colon, unspecified type  Call you gastroenterologist to schedule your colonoscopy.     Chronic left shoulder pain  -     Ambulatory referral/consult to Physical/Occupational Therapy; Future    Benign essential hypertension - Stable and controlled. Continue current medications.  -     Aldosterone/Renin Activity Ratio; Future  -     Comprehensive Metabolic Panel; Future    Dry cough - cont astelin.   -     omeprazole (PRILOSEC) 40 MG capsule; Take 1 capsule (40 mg total) by mouth once daily.  -     CT Chest Without Contrast; Future    Former smoker  -     CT Chest Without Contrast; Future    Hyperlipidemia, unspecified hyperlipidemia type - cont crestor 40.  -     Comprehensive Metabolic Panel; Future  -     Lipid Panel; Future    Aortic atherosclerosis - cont crestor 40.  -     Comprehensive Metabolic Panel; Future  -     Lipid Panel; Future    Watery eyes  -     Ambulatory referral/consult to Optometry; Future    Senile purpura - discussed easy bruising due to age. She has a L thigh bruise and not sure how she got it as she doesn't remember any injury.   -     CBC Auto Differential; Future    IGT (impaired glucose tolerance)  -     Hemoglobin A1C; Future      Use Debrox over the counter to clean your ears. You can do a drop of mineral oil in each ear to help moisturize.      Advance Care Planning     Date: 10/04/2024    Living Will  During this visit, I engaged the patient  in the voluntary advance care planning process.  The patient and I reviewed the role for advance directives and their purpose in directing future healthcare if the patient's unable to speak for him/herself.  At this point in time, the patient does have full decision-making capacity.  We discussed different extreme health states that she could experience, and reviewed what kind of medical care she would want in those situations.  The patient communicated that if she were comatose and had little chance of a meaningful recovery, she would not want machines/life-sustaining treatments used. In addition to the above preference, other important end-of-life issues for the patient include  see paperwork . The patient has completed a living will to reflect these preferences and The patient has already designated a healthcare power of  to make decisions on her behalf.  I spent a total of 3 minutes engaging the patient in this advance care planning discussion.          Power of   I initiated the process of voluntary advance care planning today and explained the importance of this process to the patient.  I introduced the concept of advance directives to the patient, as well. Then the patient received detailed information about the importance of designating a Health Care Power of  (HCPOA). She was also instructed to communicate with this person about their wishes for future healthcare, should she become sick and lose decision-making capacity. The patient has previously appointed a HCPOA. After our discussion, the patient has decided to complete a HCPOA and has appointed her sister, health care agent:  Macie Garcia  & health care agent number:  [phone] . I encouraged her to communicate with this person about their wishes for future healthcare, should she become sick and lose decision-making  capacity.      A total of 3 min was spent on advance care planning, goals of care discussion, emotional support, formulating and communicating prognosis and exploring burden/benefit of various approaches of treatment. This discussion occurred on a fully voluntary basis with the verbal consent of the patient and/or family.       RTC in 4 weeks for an audio visit to reassess cough, sooner if needed and depending on labs.    Elaine Mahmood MD  Department of Internal Medicine - [facility]  7:50 AM

## 2024-10-07 ENCOUNTER — LAB VISIT (OUTPATIENT)
Dept: LAB | Facility: HOSPITAL | Age: 69
End: 2024-10-07
Attending: INTERNAL MEDICINE
Payer: MEDICARE

## 2024-10-07 DIAGNOSIS — I10 BENIGN ESSENTIAL HYPERTENSION: ICD-10-CM

## 2024-10-07 DIAGNOSIS — E78.5 HYPERLIPIDEMIA, UNSPECIFIED HYPERLIPIDEMIA TYPE: ICD-10-CM

## 2024-10-07 DIAGNOSIS — I70.0 AORTIC ATHEROSCLEROSIS: ICD-10-CM

## 2024-10-07 DIAGNOSIS — R73.02 IGT (IMPAIRED GLUCOSE TOLERANCE): ICD-10-CM

## 2024-10-07 DIAGNOSIS — D69.2 SENILE PURPURA: ICD-10-CM

## 2024-10-07 LAB
ALBUMIN SERPL BCP-MCNC: 3.9 G/DL (ref 3.5–5.2)
ALP SERPL-CCNC: 50 U/L (ref 55–135)
ALT SERPL W/O P-5'-P-CCNC: 21 U/L (ref 10–44)
ANION GAP SERPL CALC-SCNC: 9 MMOL/L (ref 8–16)
AST SERPL-CCNC: 20 U/L (ref 10–40)
BASOPHILS # BLD AUTO: 0.04 K/UL (ref 0–0.2)
BASOPHILS NFR BLD: 1 % (ref 0–1.9)
BILIRUB SERPL-MCNC: 0.4 MG/DL (ref 0.1–1)
BUN SERPL-MCNC: 20 MG/DL (ref 8–23)
CALCIUM SERPL-MCNC: 9.3 MG/DL (ref 8.7–10.5)
CHLORIDE SERPL-SCNC: 104 MMOL/L (ref 95–110)
CHOLEST SERPL-MCNC: 151 MG/DL (ref 120–199)
CHOLEST/HDLC SERPL: 3.7 {RATIO} (ref 2–5)
CO2 SERPL-SCNC: 26 MMOL/L (ref 23–29)
CREAT SERPL-MCNC: 0.8 MG/DL (ref 0.5–1.4)
DIFFERENTIAL METHOD BLD: ABNORMAL
EOSINOPHIL # BLD AUTO: 0.1 K/UL (ref 0–0.5)
EOSINOPHIL NFR BLD: 2 % (ref 0–8)
ERYTHROCYTE [DISTWIDTH] IN BLOOD BY AUTOMATED COUNT: 14 % (ref 11.5–14.5)
EST. GFR  (NO RACE VARIABLE): >60 ML/MIN/1.73 M^2
ESTIMATED AVG GLUCOSE: 108 MG/DL (ref 68–131)
GLUCOSE SERPL-MCNC: 100 MG/DL (ref 70–110)
HBA1C MFR BLD: 5.4 % (ref 4–5.6)
HCT VFR BLD AUTO: 35.6 % (ref 37–48.5)
HDLC SERPL-MCNC: 41 MG/DL (ref 40–75)
HDLC SERPL: 27.2 % (ref 20–50)
HGB BLD-MCNC: 11.6 G/DL (ref 12–16)
IMM GRANULOCYTES # BLD AUTO: 0.01 K/UL (ref 0–0.04)
IMM GRANULOCYTES NFR BLD AUTO: 0.2 % (ref 0–0.5)
LDLC SERPL CALC-MCNC: 97 MG/DL (ref 63–159)
LYMPHOCYTES # BLD AUTO: 1.4 K/UL (ref 1–4.8)
LYMPHOCYTES NFR BLD: 34.9 % (ref 18–48)
MCH RBC QN AUTO: 31.3 PG (ref 27–31)
MCHC RBC AUTO-ENTMCNC: 32.6 G/DL (ref 32–36)
MCV RBC AUTO: 96 FL (ref 82–98)
MONOCYTES # BLD AUTO: 0.5 K/UL (ref 0.3–1)
MONOCYTES NFR BLD: 11.7 % (ref 4–15)
NEUTROPHILS # BLD AUTO: 2.1 K/UL (ref 1.8–7.7)
NEUTROPHILS NFR BLD: 50.2 % (ref 38–73)
NONHDLC SERPL-MCNC: 110 MG/DL
NRBC BLD-RTO: 0 /100 WBC
PLATELET # BLD AUTO: 220 K/UL (ref 150–450)
PMV BLD AUTO: 9.2 FL (ref 9.2–12.9)
POTASSIUM SERPL-SCNC: 3.3 MMOL/L (ref 3.5–5.1)
PROT SERPL-MCNC: 7.1 G/DL (ref 6–8.4)
RBC # BLD AUTO: 3.71 M/UL (ref 4–5.4)
SODIUM SERPL-SCNC: 139 MMOL/L (ref 136–145)
TRIGL SERPL-MCNC: 65 MG/DL (ref 30–150)
WBC # BLD AUTO: 4.1 K/UL (ref 3.9–12.7)

## 2024-10-07 PROCEDURE — 80053 COMPREHEN METABOLIC PANEL: CPT | Performed by: INTERNAL MEDICINE

## 2024-10-07 PROCEDURE — 80061 LIPID PANEL: CPT | Performed by: INTERNAL MEDICINE

## 2024-10-07 PROCEDURE — 83036 HEMOGLOBIN GLYCOSYLATED A1C: CPT | Performed by: INTERNAL MEDICINE

## 2024-10-07 PROCEDURE — 82088 ASSAY OF ALDOSTERONE: CPT | Performed by: INTERNAL MEDICINE

## 2024-10-07 PROCEDURE — 85025 COMPLETE CBC W/AUTO DIFF WBC: CPT | Performed by: INTERNAL MEDICINE

## 2024-10-07 PROCEDURE — 36415 COLL VENOUS BLD VENIPUNCTURE: CPT | Performed by: INTERNAL MEDICINE

## 2024-10-09 ENCOUNTER — CLINICAL SUPPORT (OUTPATIENT)
Dept: REHABILITATION | Facility: HOSPITAL | Age: 69
End: 2024-10-09
Attending: INTERNAL MEDICINE
Payer: MEDICARE

## 2024-10-09 DIAGNOSIS — M25.512 CHRONIC LEFT SHOULDER PAIN: ICD-10-CM

## 2024-10-09 DIAGNOSIS — R29.898 WEAKNESS OF LEFT SHOULDER: ICD-10-CM

## 2024-10-09 DIAGNOSIS — G89.29 CHRONIC LEFT SHOULDER PAIN: ICD-10-CM

## 2024-10-09 DIAGNOSIS — Z74.1 SELF-CARE DEFICIT FOR DRESSING AND GROOMING: ICD-10-CM

## 2024-10-09 DIAGNOSIS — M25.612 DECREASED RANGE OF MOTION OF LEFT SHOULDER: Primary | ICD-10-CM

## 2024-10-09 PROCEDURE — 97110 THERAPEUTIC EXERCISES: CPT | Mod: PN

## 2024-10-09 PROCEDURE — 97165 OT EVAL LOW COMPLEX 30 MIN: CPT | Mod: PN

## 2024-10-09 NOTE — PLAN OF CARE
[facility] Wellness Occupational Therapy  Initial Evaluation     Date: 10/9/2024  Name: Nga Livingston  Clinic Number: [id number]    Therapy Diagnosis:   Encounter Diagnoses   Name Primary?    Chronic left shoulder pain     Decreased range of motion of left shoulder Yes    Weakness of left shoulder     Self-care deficit for dressing and grooming      Physician: Elaine Mahmood MD    Physician Orders: Eval and Treat  Medical Diagnosis: Chronic left shoulder pain   Surgical Procedure and Date: n/a  Evaluation Date: 10/09/2024   Insurance Authorization Period Expiration: tbd  Plan of Care Certification Period: 10/09/2024 - 12/18/24  Date of Return to MD: 11/4/24    Visit # / Visits authorized: 1 / tbd  Time In:9:23 am  Time Out: 10:06 am  Total Billable Time: 43 minutes    Precautions:  HBP    Subjective     Involved Side: left  Dominant Side: Right  Date of Onset: 3-4 weeks ago  Mechanism of Injury: no injury  History of Current Condition: I am having a hard time moving my left shoulder  Surgical Procedure: n/a  Imaging: none   Previous Therapy: for her left cervical region    Past Medical History/Physical Systems Review:   gNa Livingston  has a past medical history of Depression, High blood cholesterol, Hypertension, and Osteoporosis.    Nga Livingston  has a past surgical history that includes Tubal ligation; Esophagogastroduodenoscopy (N/A, 8/14/2018); and Cystoscopy.    Nga has a current medication list which includes the following prescription(s): alendronate, aspirin, carvedilol, cetirizine, diphenhydramine, hydrochlorothiazide, ipratropium, olmesartan, omeprazole, rosuvastatin, vitamin d, and vitamin e.    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     Current FunctionalStatus   Home/Living environment : lives alone      Limitation of Functional Status as follows:   ADLs/IADLs:     - Feeding: I    - Bathing: modified with left arm use    - Dressing/Grooming: Modified    - Driving: difficulty with closing car door, drive with right hand     Leisure: going to [employer], sing in the choir      Objective     Sensation Test:  grossly intact, no complaints of sensory changes     Observation/Inspection:  rounded shoulders and forward head      Range of Motion:      (L) UE (R) UE     AROM AROM Norm   Shoulder Flexion 105 145 180   Shoulder Abduction 98 147 0-180   Shoulder Extension 28 35 0-50   Shoulder Internal Rotation Upper glut T12 0-90   Shoulder External Rotation 37 65 0-90   Horz Shoulder Adduction -7 17 0-40     ROM Comments:   Pain with end range motions left shoulder    Painful Arc:   Patient demonstrates no painful arc in shoulder flexion or abduction      Strength  Shoulder Flexion RUE: 3/5   Shoulder Extension RUE: 3+/5   Shoulder Abduction RUE:  2+/5   Internal Rotation RUE: 3/5   External Rotation RUE: 3-/5       Shoulder Flexion LUE: 2+/5   Shoulder Extension LUE: 2-/5   Shoulder Abduction LUE: 2/5   Internal Rotation LUE:  2+/5   External Rotation LUE:  2+/5         Pull Tests:  not indicated  Abduction:   Ext. Rotation:     Special Tests:  Positive: Neer Impingement and Empty can test  Negative: Speed's Test    Palpation: (for pain)     Positive:      Negative: Lateral Subacromial Space, Infraspinatus Region, Bicipital Groove, and Supraspinatus Region    Intake Outcome Measure  for FOTO shoulder  Survey    Therapist reviewed FOTO scores for Nga Livingston on 10/9/2024.   FOTO documents entered into [employer] - see Media section.    Intake Score: 62.5%       Treatment     Treatment Time In: 9:56  Treatment Time Out: 10:06  Total Treatment time separate from Evaluation time:10    Nga received therapeutic exercises for 10 minutes including:  -performance of wall slides and cervical retraction    Home Exercise Program/Education:  Issued HEP (see patient instructions in EMR) and educated on modality use for pain management . Exercises were reviewed and Nga was able to demonstrate them prior to the end of the session.   Pt received a written copy of exercises to perform at home. Nga demonstrated good  understanding of the education provided.  Pt was advised to perform these exercises free of pain, and to stop performing them if pain occurs.    Patient/Family Education: role of OT, goals for OT, scheduling/cancellations - pt verbalized understanding. Discussed insurance limitations with patient.    Additional Education provided: avoid resistive over head equipment at the gym.  Can use bicep and tricep curl machine    Assessment     Nga Livingston is a 68 y.o. female referred to outpatient occupational therapy and presents with a medical diagnosis of Chronic left shoulder pain , resulting in Decreased ROM, Decreased muscle strength, Increased pain, and modification of self care and demonstrates limitations as described in the chart below. Following medical record review it is determined that pt will benefit from occupational therapy services in order to maximize pain free and/or functional use of left shoulder. The following goals were discussed with the patient and patient is in agreement with them as to be addressed in the treatment plan. The patient's rehab potential is Fair.     Anticipated barriers to occupational therapy: long history of left shoulder problems  Pt has no cultural, educational or  language barriers to learning provided.    Medical Necessity is demonstrated by the following  Occupational Profile/History  Co-morbidities and personal factors that may impact the plan of care [x] LOW: Brief chart review  [] MODERATE: Expanded chart review   [] HIGH: Extensive chart review    Moderate / High Support Documentation:      Examination  Performance deficits relating to physical, cognitive or psychosocial skills that result in activity limitations and/or participation restrictions  [x] LOW: addressing 1-3 Performance deficits  [] MODERATE: 3-5 Performance deficits  [] HIGH: 5+ Performance deficits (please support below)    Moderate / High Support Documentation:    Physical:  Joint Mobility  Muscle Power/Strength  Postural Control  Pain    Cognitive:  No Deficits    Psychosocial:    No Deficits     Treatment Options [x] LOW: Limited options  [] MODERATE: Several options  [] HIGH: Multiple options      Decision Making/ Complexity Score: low       The following goals were discussed with the patient and patient is in agreement with them as to be addressed in the treatment plan.     Goals to be met in 11/6/24 weeks:  1) Pt will be independent and report performing HEP to maximize (R) shoulder functional capacity.  2) Pt will increase shoulder AROM in all measured planes of motion by 10 degrees to A with daily task.  3) Pt will report use of home modalities for pain management.  4) Pt will report pain at it's worse 5/10     Long term goals met by discharge  Pt will increase active range of motion of shoulder elevation to 120 for self care tasks  Pt will report MD of 2 out 10 in left shoulder with use  Pt will continue with her shoulder exercises 3 x week at her gym    Plan   Certification Period/Plan of care expiration: 10/9/2024 to 12/18/24.    Outpatient Occupational Therapy 2 times weekly for 10 weeks to include the following interventions: Modalities for pain management, US 3 mhz, Therapeutic  exercises/activities., and Strengthening.      JEREMY Shepherd LOTR      I certify the need for these services furnished under this plan of treatment and while under my care    ____________________________________  Physician/Referring Practitioner    _______________  Date of Signature

## 2024-10-10 LAB
ALDOST SERPL-MCNC: 22 NG/DL
ALDOST/RENIN PLAS-RTO: 0.9 RATIO
RENIN PLAS-CCNC: 24.5 NG/ML/HR

## 2024-10-16 NOTE — PROGRESS NOTES
[facility]  Occupational Therapy Treatment Note     Date: 10/18/2024  Name: Nga Livingston  Clinic Number: [id number]    Therapy Diagnosis:   Encounter Diagnoses   Name Primary?    Decreased range of motion of left shoulder Yes    Weakness of left shoulder     Self-care deficit for dressing and grooming      Physician: Elaine Mahmood MD    Physician Orders: Eval and Treat  Medical Diagnosis: Chronic left shoulder pain   Surgical Procedure and Date: n/a  Evaluation Date: 10/09/2024   Insurance Authorization Period Expiration: tbd  Plan of Care Certification Period: 10/09/2024 - 12/18/24  Date of Return to MD: 11/4/24     Visit # / Visits authorized: 1 / tbd      Time In: 7:52 am  Time Out: 8:36 am  Total Billable Time: 44 minutes     Precautions:  HBP    Subjective     Patient reports: I think I am just a little bit better.  She was somewhat compliant with home exercise program given last session.   Response to previous treatment: shoulder was sore  Functional change: nothing     Pain: 5/10  Location: left shoulder      Objective     Objective Measures updated at progress report unless specified.    Treatment     Nga received the treatments listed below:      therapeutic exercises to develop strength, ROM, and flexibility for 44 minutes including:  Sci fit with Bilateral upper extremity x 8 min  Supine passive left shoulder flexion, rotation and adduction              Dowel chest press x 10 reps               Dowel flexion x 10 reps  Pulley stretch for flexion and scaption x 3 min       Patient Education and Home Exercises     Education provided:   -instructed not to perform overhead presses at her gym  - strongly encouraged her to perform her entire HEP  - Progress towards goals     Written Home Exercises Provided: Pt instructed to continue prior HEP.  Exercises were reviewed and Nga was able to demonstrate them prior to the end of the session.  Nga demonstrated fair  understanding of the  home exercise program provided. See electronic medical record under Patient Instructions for exercises provided during therapy sessions.       Assessment     Nga continues with pain in her left shoulder although she was not compliant with her HEP.  Pain present at end ranges of motion but not as intense as her highest pain.     Nga is progressing slowly towards her goals and there are no updates to goals at this time. Pt prognosis is Fair.     Patient will continue to benefit from skilled outpatient occupational therapy to address the deficits listed in the problem list on initial evaluation provide patient/family education and to maximize patient's level of independence in the home and community environment.     Patient's spiritual, cultural and educational needs considered and patient agreeable to plan of care and goals.    Anticipated barriers to occupational therapy: compliance with HEP    Goals:  Goals to be met in 11/6/24 weeks:  1) Pt will be independent and report performing HEP to maximize (R) shoulder functional capacity.  2) Pt will increase shoulder AROM in all measured planes of motion by 10 degrees to A with daily task.  3) Pt will report use of home modalities for pain management.  4) Pt will report pain at it's worse 5/10      Long term goals met by discharge  Pt will increase active range of motion of shoulder elevation to 120 for self care tasks  Pt will report RI of 2 out 10 in left shoulder with use  Pt will continue with her shoulder exercises 3 x week at her gym    Plan   Performance of stretching, active and progressing to resistive exercises as tolerated.  Modalities prn to assist with pain.  Updates/Grading for next session: none    JEREMY Shepherd   10/18/2024      Yes

## 2024-10-18 ENCOUNTER — CLINICAL SUPPORT (OUTPATIENT)
Dept: REHABILITATION | Facility: HOSPITAL | Age: 69
End: 2024-10-18
Payer: MEDICARE

## 2024-10-18 DIAGNOSIS — M25.612 DECREASED RANGE OF MOTION OF LEFT SHOULDER: Primary | ICD-10-CM

## 2024-10-18 DIAGNOSIS — Z74.1 SELF-CARE DEFICIT FOR DRESSING AND GROOMING: ICD-10-CM

## 2024-10-18 DIAGNOSIS — R29.898 WEAKNESS OF LEFT SHOULDER: ICD-10-CM

## 2024-10-18 PROCEDURE — 97110 THERAPEUTIC EXERCISES: CPT | Mod: PN

## 2024-10-18 NOTE — PROGRESS NOTES
[facility]  Occupational Therapy Treatment Note     Date: 10/21/2024  Name: Nga Livingston  Clinic Number: [id number]    Therapy Diagnosis:   Encounter Diagnoses   Name Primary?    Decreased range of motion of left shoulder Yes    Weakness of left shoulder     Self-care deficit for dressing and grooming      Physician: Elaine Mahmood MD    Physician Orders: Eval and Treat  Medical Diagnosis: Chronic left shoulder pain   Surgical Procedure and Date: n/a  Evaluation Date: 10/09/2024   Insurance Authorization Period Expiration: 11/30/24  Plan of Care Certification Period: 10/09/2024 - 12/18/24  Date of Return to MD: 11/4/24     Visit # / Visits authorized: 2/10      Time In: 7:45 am  Time Out: 8:30 am  Total Billable Time: 45 minutes     Precautions:  HBP    Subjective     Patient reports: I feel like my shoulder is better, depends on the time of day.  When I move around it feels better.  She was somewhat compliant with home exercise program provided   Response to previous treatment: a little stiff and sore but eased up about an hour after  Functional change: nothing noted     Pain: 5/10  Location: left shoulder      Objective     Objective Measures updated at progress report unless specified.    Treatment     Nga received the treatments listed below:      therapeutic exercises to develop strength, ROM, and flexibility for 45  minutes including:  Sci fit with Bilateral upper extremity x 8 min  Pulley stretch for flexion and scaption x 3 min each  Supine passive left shoulder flexion, rotation and adduction              Friction massage to left bicep tendon until pain diminished              Dowel flexion x 10 reps  Sidelying gator chomp with scapular - hands on assist  Seated scapular retraction x 10 reps               Pt declined modalities post therapy.     Patient Education and Home Exercises     Education provided:   -instructed not to perform overhead presses at her gym  - strongly  encouraged her to perform her entire HEP  - Progress towards goals     Written Home Exercises Provided: Pt instructed to continue prior HEP.  Exercises were reviewed and Nga was able to demonstrate them prior to the end of the session.  Nga demonstrated fair  understanding of the home exercise program provided. See electronic medical record under Patient Instructions for exercises provided during therapy sessions.       Assessment     Nga continues with pain improvement in her pain post therapy session.  Some difficulty with performance of scapular exercises which required verbal and manual cues to improve technique.    Nga is progressing slowly towards her goals and there are no updates to goals at this time. Pt prognosis is Fair.     Patient will continue to benefit from skilled outpatient occupational therapy to address the deficits listed in the problem list on initial evaluation provide patient/family education and to maximize patient's level of independence in the home and community environment.     Patient's spiritual, cultural and educational needs considered and patient agreeable to plan of care and goals.    Anticipated barriers to occupational therapy: compliance with HEP    Goals:  Goals to be met in 11/6/24 weeks:  1) Pt will be independent and report performing HEP to maximize (R) shoulder functional capacity.  2) Pt will increase shoulder AROM in all measured planes of motion by 10 degrees to A with daily task.  3) Pt will report use of home modalities for pain management.  4) Pt will report pain at it's worse 5/10      Long term goals met by discharge  Pt will increase active range of motion of shoulder elevation to 120 for self care tasks  Pt will report [place] of 2 out 10 in left shoulder with use  Pt will continue with her shoulder exercises 3 x week at her gym    Plan   Performance of stretching, active and progressing to resistive exercises as tolerated.  Modalities prn to assist with  pain.  Updates/Grading for next session: none    JEREMY Shepherd   10/21/2024

## 2024-10-21 ENCOUNTER — CLINICAL SUPPORT (OUTPATIENT)
Dept: REHABILITATION | Facility: HOSPITAL | Age: 69
End: 2024-10-21
Payer: MEDICARE

## 2024-10-21 DIAGNOSIS — R29.898 WEAKNESS OF LEFT SHOULDER: ICD-10-CM

## 2024-10-21 DIAGNOSIS — Z74.1 SELF-CARE DEFICIT FOR DRESSING AND GROOMING: ICD-10-CM

## 2024-10-21 DIAGNOSIS — M25.612 DECREASED RANGE OF MOTION OF LEFT SHOULDER: Primary | ICD-10-CM

## 2024-10-21 PROCEDURE — 97110 THERAPEUTIC EXERCISES: CPT | Mod: PN

## 2024-10-28 ENCOUNTER — CLINICAL SUPPORT (OUTPATIENT)
Dept: REHABILITATION | Facility: HOSPITAL | Age: 69
End: 2024-10-28
Payer: MEDICARE

## 2024-10-28 DIAGNOSIS — R29.898 WEAKNESS OF LEFT SHOULDER: ICD-10-CM

## 2024-10-28 DIAGNOSIS — Z74.1 SELF-CARE DEFICIT FOR DRESSING AND GROOMING: ICD-10-CM

## 2024-10-28 DIAGNOSIS — M25.612 DECREASED RANGE OF MOTION OF LEFT SHOULDER: Primary | ICD-10-CM

## 2024-10-28 PROCEDURE — 97110 THERAPEUTIC EXERCISES: CPT | Mod: PN

## 2024-10-30 NOTE — PROGRESS NOTES
[facility]  Occupational Therapy Progress / Treatment Note     Date: 11/4/2024  Name: Nga Livingston  Clinic Number: [id number]    Therapy Diagnosis:   Encounter Diagnoses   Name Primary?    Decreased range of motion of left shoulder Yes    Weakness of left shoulder     Self-care deficit for dressing and grooming      Physician: Elaine Mahmood MD    Physician Orders: Eval and Treat  Medical Diagnosis: Chronic left shoulder pain   Surgical Procedure and Date: n/a  Evaluation Date: 10/09/2024   Insurance Authorization Period Expiration: 11/30/24  Plan of Care Certification Period: 10/09/2024 - 12/18/24  Date of Return to MD: 11/4/24     Visit # / Visits authorized: 4/10      Time In: 7:50 am  Time Out: 8:30 am  Total Billable Time: 40 minutes     Precautions:  HBP    Subjective     Patient reports: I usually have more pain and stiffness in the morning.   She was compliant with home exercise program provided   Response to previous treatment: was sore but moving better  Functional change: nothing noted     Pain: 3-4/10  Location: left shoulder      Objective     Objective Measures updated at progress report unless specified.    Range of Motion:                                         10/9/24       11/4/24          10/9/24    (L) UE (L) UE (R) UE       AROM AROM AROM Norm   Shoulder Flexion 105 122 145 180   Shoulder Abduction 98 138 147 0-180   Shoulder Extension 28 30 35 0-50   Shoulder Internal Rotation Upper glut L3 T12 0-90   Shoulder External Rotation 37 23 65 0-90   Horz Shoulder Adduction -7 10 17 0-40      Treatment     Nga received the treatments listed below:      therapeutic exercises to develop strength, ROM, and flexibility for 40  minutes including: assessment and exercise.  Sci fit with Bilateral upper extremity x 6 min  Supine passive left shoulder flexion and rotation              Friction massage to left bicep tendon until pain diminished  Seated dowel flexion x 10 reps                        Yellow theraband shoulder extension x  10 reps                               Row x 10 reps                               Adduction x 10 reps                                                                  Pt declined modalities post therapy.     Patient Education and Home Exercises     Education provided:   - strongly encouraged her to perform her entire HEP  - Progress towards goals     Written Home Exercises Provided: Pt instructed to continue prior HEP.  Exercises were reviewed and Nga was able to demonstrate them prior to the end of the session.  Nga demonstrated fair  understanding of the home exercise program provided. See electronic medical record under Patient Instructions for exercises provided during therapy sessions.       Assessment     Nga with improvement in her left shoulder active range of motion although some pain is still present in her shoulder area.  Intensity of pain has decreased.    Nga is progressing slowly towards her goals and there are no updates to goals at this time. Pt prognosis is Fair.     Patient will continue to benefit from skilled outpatient occupational therapy to address the deficits listed in the problem list on initial evaluation provide patient/family education and to maximize patient's level of independence in the home and community environment.     Patient's spiritual, cultural and educational needs considered and patient agreeable to plan of care and goals.    Anticipated barriers to occupational therapy: compliance with HEP    Goals:  Goals to be met in 11/6/24 weeks:  1) Pt will be independent and report performing HEP to maximize (R) shoulder functional capacity.-progressing  2) Pt will increase shoulder AROM in all measured planes of motion by 10 degrees to A with daily task.-met with all but external rotation and extensions only-11/4/24  3) Pt will report use of home modalities for pain management.  4) Pt will report pain at it's worse 5/10-  improving      Long term goals met by discharge  Pt will increase active range of motion of shoulder elevation to 120 for self care tasks  Pt will report [place] of 2 out 10 in left shoulder with use  Pt will continue with her shoulder exercises 3 x week at her gym    Plan   Performance of stretching, active and progressing to resistive exercises as tolerated.  Modalities prn to assist with pain.  Updates/Grading for next session: none    JEREMY Shepherd   11/4/2024

## 2024-11-04 ENCOUNTER — OFFICE VISIT (OUTPATIENT)
Dept: PRIMARY CARE CLINIC | Facility: CLINIC | Age: 69
End: 2024-11-04
Payer: MEDICARE

## 2024-11-04 ENCOUNTER — CLINICAL SUPPORT (OUTPATIENT)
Dept: REHABILITATION | Facility: HOSPITAL | Age: 69
End: 2024-11-04
Payer: MEDICARE

## 2024-11-04 DIAGNOSIS — M25.612 DECREASED RANGE OF MOTION OF LEFT SHOULDER: Primary | ICD-10-CM

## 2024-11-04 DIAGNOSIS — R29.898 WEAKNESS OF LEFT SHOULDER: ICD-10-CM

## 2024-11-04 DIAGNOSIS — R05.3 CHRONIC COUGH: Primary | ICD-10-CM

## 2024-11-04 DIAGNOSIS — I10 BENIGN ESSENTIAL HYPERTENSION: ICD-10-CM

## 2024-11-04 DIAGNOSIS — E87.6 HYPOKALEMIA: ICD-10-CM

## 2024-11-04 DIAGNOSIS — Z74.1 SELF-CARE DEFICIT FOR DRESSING AND GROOMING: ICD-10-CM

## 2024-11-04 PROCEDURE — 1160F RVW MEDS BY RX/DR IN RCRD: CPT | Mod: CPTII,95,, | Performed by: INTERNAL MEDICINE

## 2024-11-04 PROCEDURE — 4010F ACE/ARB THERAPY RXD/TAKEN: CPT | Mod: CPTII,95,, | Performed by: INTERNAL MEDICINE

## 2024-11-04 PROCEDURE — 1157F ADVNC CARE PLAN IN RCRD: CPT | Mod: CPTII,95,, | Performed by: INTERNAL MEDICINE

## 2024-11-04 PROCEDURE — 1159F MED LIST DOCD IN RCRD: CPT | Mod: CPTII,95,, | Performed by: INTERNAL MEDICINE

## 2024-11-04 PROCEDURE — 99441 PR PHYSICIAN TELEPHONE EVALUATION 5-10 MIN: CPT | Mod: 95,,, | Performed by: INTERNAL MEDICINE

## 2024-11-04 PROCEDURE — 3044F HG A1C LEVEL LT 7.0%: CPT | Mod: CPTII,95,, | Performed by: INTERNAL MEDICINE

## 2024-11-04 PROCEDURE — 97110 THERAPEUTIC EXERCISES: CPT | Mod: CK,PN

## 2024-11-04 RX ORDER — CARVEDILOL 25 MG/1
TABLET ORAL
Qty: 180 TABLET | Refills: 3 | Status: SHIPPED | OUTPATIENT
Start: 2024-11-04

## 2024-11-04 RX ORDER — HYDROCHLOROTHIAZIDE 12.5 MG/1
12.5 TABLET ORAL DAILY
Qty: 90 TABLET | Refills: 3 | Status: SHIPPED | OUTPATIENT
Start: 2024-11-04

## 2024-11-04 RX ORDER — HYDROCHLOROTHIAZIDE 12.5 MG/1
12.5 TABLET ORAL DAILY
Qty: 90 TABLET | Refills: 3 | Status: SHIPPED | OUTPATIENT
Start: 2024-11-04 | End: 2024-11-04 | Stop reason: SDUPTHER

## 2024-11-04 RX ORDER — OLMESARTAN MEDOXOMIL 20 MG/1
20 TABLET ORAL DAILY
Qty: 90 TABLET | Refills: 3 | Status: SHIPPED | OUTPATIENT
Start: 2024-11-04

## 2024-11-04 NOTE — PROGRESS NOTES
Established Patient - Audio Only Telehealth Visit     The patient location is: [place], [place]  The chief complaint leading to consultation is: chronic cough  Visit type: Virtual visit with audio only (telephone)  Total time spent with patient: 9 min       The reason for the audio only service rather than synchronous audio and video virtual visit was related to technical difficulties or patient preference/necessity.     Each patient to whom I provide medical services by telemedicine is:  (1) informed of the relationship between the physician and patient and the respective role of any other health care provider with respect to management of the patient; and (2) notified that they may decline to receive medical services by telemedicine and may withdraw from such care at any time. Patient verbally consented to receive this service via voice-only telephone call.       HPI:   At our last visit 10/4/24, pt c/o dry cough x 2 mo. Feels like she has mucus in the throat and it causes hacking cough at times. Uses astelin regularly.   Benadryl didn't help.   EGD 2018 w/ small hiatal hernia, LA Grade A reflux esophagitis, esophageal stenosis s/p dilation.  Started on omeprazole 40mg qam. Reports cough is improved but not entirely gone. Still gets it at least once a day. No fevers/chills/wheezing.   CT chest due to chronic cough and former smoker.    Labs:  Aldosterone/renin ok.   Hgb mild anemia 11.6, chronic and stable. K mildly low at 3.3. on hctz 12.5mg daily.  LDL went up to 97. Crestor 40mg daily. Has been consistently taking.  A1c ok    Needs refills for BP meds.     Assessment and plan:    Diagnoses and all orders for this visit:    Chronic cough - wants to try omeprazole for a few more weeks to see if cough entirely goes away first. If it persists, pt will schedule CT - told her it's already ordered.     Benign essential hypertension - Stable and controlled. Continue current medications.  -     carvediloL (COREG) 25 MG  tablet; TAKE 1 TABLET  2  TIMES DAILY WITH MEALS.  -     hydroCHLOROthiazide (HYDRODIURIL) 12.5 MG Tab; Take 1 tablet (12.5 mg total) by mouth once daily.  -     olmesartan (BENICAR) 20 MG tablet; Take 1 tablet (20 mg total) by mouth once daily.    Hypokalemia - pt wants to try high potassium foods first. Then will repeat labs next week. If persistent, may have to start potassium supplement.   -     BASIC METABOLIC PANEL; Future  -     Magnesium; Future    Follow up as previously scheduled or if symptoms worsen.     Elaine Mahmood MD  Department of Internal Medicine - [facility] 65+  11:14 AM         This service was not originating from a related E/M service provided within the previous 7 days nor will  to an E/M service or procedure within the next 24 hours or my soonest available appointment.  Prevailing standard of care was able to be met in this audio-only visit.

## 2024-11-07 NOTE — PROGRESS NOTES
[facility]  Occupational Therapy Progress / Treatment Note     Date: 11/11/2024  Name: Nga Livingston  Clinic Number: [id number]    Therapy Diagnosis:   1. Decreased range of motion of left shoulder        2. Weakness of left shoulder        3. Self-care deficit for dressing and grooming            Physician: Elaine Mahmood MD    Physician Orders: Eval and Treat  Medical Diagnosis: Chronic left shoulder pain   Surgical Procedure and Date: n/a  Evaluation Date: 10/09/2024   Insurance Authorization Period Expiration: 11/30/24  Plan of Care Certification Period: 10/09/2024 - 12/18/24  Date of Return to MD: 11/4/24     Visit # / Visits authorized: 5/10    Time In:  7:50 am  Time Out:  8:38 am  Total Billable Time: 48  minutes     Precautions:  HBP    Subjective     Patient reports: I am feeling okay.   She was compliant with home exercise program provided   Response to previous treatment: it felt okay  Functional change: nothing noted     Pain: 3-4/10  Location: left shoulder      Objective     Objective Measures updated at progress report unless specified.    Range of Motion:                                         10/9/24       11/4/24          10/9/24    (L) UE (L) UE (R) UE       AROM AROM AROM Norm   Shoulder Flexion 105 122 145 180   Shoulder Abduction 98 138 147 0-180   Shoulder Extension 28 30 35 0-50   Shoulder Internal Rotation Upper glut L3 T12 0-90   Shoulder External Rotation 37 23 65 0-90   Horz Shoulder Adduction -7 10 17 0-40      Treatment     Nga received the treatments listed below:      therapeutic exercises to develop strength, ROM, and flexibility for 48  minutes including: assessment and exercise.  Sci fit with Bilateral upper extremity x 6 min  Friction massage to left bicep tendon until pain diminished  Supine passive left shoulder flexion and rotation              1 and 2# shoulder flexion 10 reps each  Prone internal rotation stretch x 3 min                    Supine  Yellow theraband shoulder extension x  2 sets15 reps                                           H. Abduction x 2 sets 15 reps                                                                                               Pt declined modalities post therapy.     Patient Education and Home Exercises     Education provided:   - strongly encouraged her to perform her entire HEP  - Progress towards goals     Written Home Exercises Provided: Pt instructed to continue prior HEP.  Exercises were reviewed and Nga was able to demonstrate them prior to the end of the session.  Nga demonstrated fair  understanding of the home exercise program provided. See electronic medical record under Patient Instructions for exercises provided during therapy sessions.       Assessment     Nga tolerated therapy well with no reports of increased pain post therapy. She is able to continue with her gym workout without pain as well.     Nga is progressing slowly towards her goals and there are no updates to goals at this time. Pt prognosis is Fair.     Patient will continue to benefit from skilled outpatient occupational therapy to address the deficits listed in the problem list on initial evaluation provide patient/family education and to maximize patient's level of independence in the home and community environment.     Patient's spiritual, cultural and educational needs considered and patient agreeable to plan of care and goals.    Anticipated barriers to occupational therapy: compliance with HEP    Goals:  Goals to be met in 11/6/24 weeks:  1) Pt will be independent and report performing HEP to maximize (R) shoulder functional capacity.-progressing  2) Pt will increase shoulder AROM in all measured planes of motion by 10 degrees to A with daily task.-met with all but external rotation and extensions only-11/4/24  3) Pt will report use of home modalities for pain management.  4) Pt will report pain at it's worse 5/10- improving       Long term goals met by discharge  Pt will increase active range of motion of shoulder elevation to 120 for self care tasks  Pt will report [place] of 2 out 10 in left shoulder with use  Pt will continue with her shoulder exercises 3 x week at her gym    Plan   Performance of stretching, active and progressing to resistive exercises as tolerated.  Modalities prn to assist with pain.  Updates/Grading for next session: none    JEREMY Shepherd   11/11/2024

## 2024-11-11 ENCOUNTER — CLINICAL SUPPORT (OUTPATIENT)
Dept: REHABILITATION | Facility: HOSPITAL | Age: 69
End: 2024-11-11
Payer: MEDICARE

## 2024-11-11 DIAGNOSIS — Z74.1 SELF-CARE DEFICIT FOR DRESSING AND GROOMING: ICD-10-CM

## 2024-11-11 DIAGNOSIS — R29.898 WEAKNESS OF LEFT SHOULDER: ICD-10-CM

## 2024-11-11 DIAGNOSIS — M25.612 DECREASED RANGE OF MOTION OF LEFT SHOULDER: Primary | ICD-10-CM

## 2024-11-11 PROCEDURE — 97110 THERAPEUTIC EXERCISES: CPT | Mod: PN

## 2024-11-15 NOTE — PROGRESS NOTES
[facility]  Occupational Therapy Treatment Note     Date: 11/18/2024  Name: Nga Livingston  Clinic Number: [id number]    Therapy Diagnosis:   1. Decreased range of motion of left shoulder        2. Weakness of left shoulder        3. Self-care deficit for dressing and grooming          Physician: Elaine Mahmood MD    Physician Orders: Eval and Treat  Medical Diagnosis: Chronic left shoulder pain   Surgical Procedure and Date: n/a  Evaluation Date: 10/09/2024   Insurance Authorization Period Expiration: 11/30/24  Plan of Care Certification Period: 10/09/2024 - 12/18/24  Date of Return to MD: 11/4/24     Visit # / Visits authorized: 6/10    Time In:  7:50 am  Time Out:  8:45 am  Total Billable Time: 45  minutes     Precautions:  HBP    Subjective     Patient reports: I feel line it is getting better.   She was compliant with home exercise program provided   Response to previous treatment: it felt okay  Functional change: nothing noted     Pain: 3-4/10  Location: left shoulder      Objective     Objective Measures updated at progress report unless specified.    Range of Motion:                                         10/9/24       11/4/24          10/9/24    (L) UE (L) UE (R) UE       AROM AROM AROM Norm   Shoulder Flexion 105 122 145 180   Shoulder Abduction 98 138 147 0-180   Shoulder Extension 28 30 35 0-50   Shoulder Internal Rotation Upper glut L3 T12 0-90   Shoulder External Rotation 37 23 65 0-90   Horz Shoulder Adduction -7 10 17 0-40      Treatment     Nga received the treatments listed below:      therapeutic exercises to develop strength, ROM, and flexibility for 45  minutes including: assessment and exercise.  Sci fit with Bilateral upper extremity x 6 min  Pulley stretch for left shoulder flexion, scaption, internal rotation  x 3 min each  Rows 10# x 2 sets 10 reps  Prone shoulder extension with elevation x 2 sets 10 reps hold 3 seconds                             Row x 2 sets 10  reps  Yellow theraband shoulder extension, internal rotation and external rotation  x  10 reps                                                                                                                                       Pt declined modalities post therapy.     Patient Education and Home Exercises     Education provided:   - strongly encouraged her to perform her entire HEP  - Progress towards goals     Written Home Exercises Provided: Pt instructed to continue prior HEP.  Exercises were reviewed and Nga was able to demonstrate them prior to the end of the session.  Nga demonstrated fair  understanding of the home exercise program provided. See electronic medical record under Patient Instructions for exercises provided during therapy sessions.       Assessment     Nga tolerated therapy well with requirement for instructions to perform exercises effectively. Able to continue proper technique with minimal cues.  Pain of shoulder has improved.    Nga is progressing slowly towards her goals and there are no updates to goals at this time. Pt prognosis is Fair.     Patient will continue to benefit from skilled outpatient occupational therapy to address the deficits listed in the problem list on initial evaluation provide patient/family education and to maximize patient's level of independence in the home and community environment.     Patient's spiritual, cultural and educational needs considered and patient agreeable to plan of care and goals.    Anticipated barriers to occupational therapy: compliance with HEP    Goals:  Goals to be met in 11/6/24 weeks:  1) Pt will be independent and report performing HEP to maximize (R) shoulder functional capacity.-progressing  2) Pt will increase shoulder AROM in all measured planes of motion by 10 degrees to A with daily task.-met with all but external rotation and extensions only-11/4/24  3) Pt will report use of home modalities for pain management.  4) Pt will  report pain at it's worse 5/10- improving      Long term goals met by discharge  Pt will increase active range of motion of shoulder elevation to 120 for self care tasks  Pt will report [place] of 2 out 10 in left shoulder with use  Pt will continue with her shoulder exercises 3 x week at her gym    Plan   Performance of stretching, active and progressing to resistive exercises as tolerated.  Modalities prn to assist with pain.  Updates/Grading for next session: none    JEREMY Shepherd   11/18/2024

## 2024-11-18 ENCOUNTER — CLINICAL SUPPORT (OUTPATIENT)
Dept: REHABILITATION | Facility: HOSPITAL | Age: 69
End: 2024-11-18
Payer: MEDICARE

## 2024-11-18 DIAGNOSIS — R29.898 WEAKNESS OF LEFT SHOULDER: ICD-10-CM

## 2024-11-18 DIAGNOSIS — M25.612 DECREASED RANGE OF MOTION OF LEFT SHOULDER: Primary | ICD-10-CM

## 2024-11-18 DIAGNOSIS — Z74.1 SELF-CARE DEFICIT FOR DRESSING AND GROOMING: ICD-10-CM

## 2024-11-18 PROCEDURE — 97110 THERAPEUTIC EXERCISES: CPT | Mod: PN

## 2024-11-25 NOTE — PROGRESS NOTES
[facility]  Occupational Therapy Treatment Note     Date: 11/27/2024  Name: Nga Livingston  Clinic Number: [id number]    Therapy Diagnosis:   1. Decreased range of motion of left shoulder        2. Weakness of left shoulder        3. Self-care deficit for dressing and grooming          Physician: Elaine Mahmood MD    Physician Orders: Eval and Treat  Medical Diagnosis: Chronic left shoulder pain   Surgical Procedure and Date: n/a  Evaluation Date: 10/09/2024   Insurance Authorization Period Expiration: 11/30/24  Plan of Care Certification Period: 10/09/2024 - 12/18/24  Date of Return to MD: 11/4/24     Visit # / Visits authorized: 7/10    Time In:  6:55 am  Time Out:  7:40 am  Total Billable Time: 45  minutes     Precautions:  HBP    Subjective     Patient reports: My shoulder is feeling better.    She was compliant with home exercise program provided   Response to previous treatment: it felt good  Functional change: nothing noted     Pain: 2/10  Location: left shoulder      Objective     Objective Measures updated at progress report unless specified.    Range of Motion:                                         10/9/24       11/4/24          10/9/24    (L) UE (L) UE (R) UE       AROM AROM AROM Norm   Shoulder Flexion 105 122 145 180   Shoulder Abduction 98 138 147 0-180   Shoulder Extension 28 30 35 0-50   Shoulder Internal Rotation Upper glut L3 T12 0-90   Shoulder External Rotation 37 23 65 0-90   Horz Shoulder Adduction -7 10 17 0-40      Treatment     Nga received the treatments listed below:      therapeutic exercises to develop strength, ROM, and flexibility for 45 minutes including: assessment and exercise.  Sci fit with Bilateral upper extremity x 6 min  Supine 2# dowel flexion x 10               Vertical dowel flexion x 10                Scapular protraction x 20 reps  Left side lying upper extremity ranger flexion x 20 reps  Prone shoulder extension with external rotation x 1# sets  10 reps hold 3 seconds                             Row x 1#  10 reps 3 second hold  Omari scap stabilization 100 gram ball on wall  CW and CCW x 20 reps  Overhead ball bounce on wall x 20 reps  Red band right arm adduction, internal rotation, external rotation and extension x 10 reps each  Matrix rows 15# x 10 reps                      Yellow theraband shoulder extension, internal rotation and external rotation  x  10 reps                                                                                                                                       Pt declined modalities post therapy.     Patient Education and Home Exercises     Education provided:   - strongly encouraged her to perform her entire HEP  - Progress towards goals     Written Home Exercises Provided: Pt instructed to continue prior HEP.  Exercises were reviewed and Nga was able to demonstrate them prior to the end of the session.  Nga demonstrated fair  understanding of the home exercise program provided. See electronic medical record under Patient Instructions for exercises provided during therapy sessions.       Assessment     Nga continues to report improvement in her right shoulder pain and use.  Resistive exercises tolerated well today with expected fatigue.    Nga is progressing slowly towards her goals and there are no updates to goals at this time. Pt prognosis is Fair.     Patient will continue to benefit from skilled outpatient occupational therapy to address the deficits listed in the problem list on initial evaluation provide patient/family education and to maximize patient's level of independence in the home and community environment.     Patient's spiritual, cultural and educational needs considered and patient agreeable to plan of care and goals.    Anticipated barriers to occupational therapy: compliance with HEP    Goals:  Goals to be met in 11/6/24 weeks:  1) Pt will be independent and report performing HEP to maximize  (R) shoulder functional capacity.-progressing  2) Pt will increase shoulder AROM in all measured planes of motion by 10 degrees to A with daily task.-met with all but external rotation and extensions only-11/4/24  3) Pt will report use of home modalities for pain management.  4) Pt will report pain at it's worse 5/10- improving      Long term goals met by discharge  Pt will increase active range of motion of shoulder elevation to 120 for self care tasks  Pt will report LA of 2 out 10 in left shoulder with use  Pt will continue with her shoulder exercises 3 x week at her gym    Plan   Performance of stretching, active and progressing to resistive exercises as tolerated.  Modalities prn to assist with pain.  Updates/Grading for next session: none    JEREMY Shepherd   11/27/2024

## 2024-11-27 ENCOUNTER — CLINICAL SUPPORT (OUTPATIENT)
Dept: REHABILITATION | Facility: HOSPITAL | Age: 69
End: 2024-11-27
Payer: MEDICARE

## 2024-11-27 DIAGNOSIS — M25.612 DECREASED RANGE OF MOTION OF LEFT SHOULDER: Primary | ICD-10-CM

## 2024-11-27 DIAGNOSIS — R29.898 WEAKNESS OF LEFT SHOULDER: ICD-10-CM

## 2024-11-27 DIAGNOSIS — Z74.1 SELF-CARE DEFICIT FOR DRESSING AND GROOMING: ICD-10-CM

## 2024-11-27 PROCEDURE — 97110 THERAPEUTIC EXERCISES: CPT | Mod: CK,PN

## 2024-12-03 NOTE — PROGRESS NOTES
[facility]  Occupational Therapy Treatment Note     Date: 12/4/2024  Name: Nga Livingston  Clinic Number: [id number]    Therapy Diagnosis:   1. Decreased range of motion of left shoulder        2. Weakness of left shoulder        3. Self-care deficit for dressing and grooming          Physician: Elaine Mahmood MD    Physician Orders: Eval and Treat  Medical Diagnosis: Chronic left shoulder pain   Surgical Procedure and Date: n/a  Evaluation Date: 10/09/2024   Insurance Authorization Period Expiration: 11/30/24  Plan of Care Certification Period: 10/09/2024 - 12/18/24  Date of Return to MD: 11/4/24     Visit # / Visits authorized: 8/10    Time In:  7:43 am  Time Out:  8:25 am  Total Billable Time: 42  minutes     Precautions:  HBP    Subjective     Patient reports: My shoulder is feeling better.    She was compliant with home exercise program provided   Response to previous treatment: no ill effects  Functional change: I can wash my back with my left arm now     Pain: 2/10  Location: left shoulder      Objective     Objective Measures updated at progress report unless specified.    Range of Motion:                                         10/9/24       11/4/24          12/4/24        10/9/24    (L) UE (L) UE (L) UE (R) UE       AROM AROM AROM AROM Norm   Shoulder Flexion 105 122 126 145 180   Shoulder Abduction 98 138 148 147 0-180   Shoulder Extension 28 30 40 35 0-50   Shoulder Internal Rotation Upper glut L3 L1 T12 0-90   Shoulder External Rotation 37 23 67 65 0-90   Horz Shoulder Adduction -7 10 15 17 0-40      Treatment     Nga received the treatments listed below:      therapeutic exercises to develop strength, ROM, and flexibility for 42 minutes including: assessment and exercise.  Sci fit with Bilateral upper extremity x 6 min  Matrix rows 15# x 20 reps    Arms overhead on wall scapular lift x 10 - manual and verbal cues required  Scap stabilization ball on wall rotations CCW and CW x  "20 reps  Prone scapular retraction arms at sides x 10 reps                                            "T" x 10 reps  Supine 3# dowel flexion x 10  Right  side lying right shoulder abduction x 10 reps                                                                                                                                                         Pt declined modalities post therapy.     Patient Education and Home Exercises     Education provided:   - strongly encouraged her to perform her entire HEP  - Progress towards goals     Written Home Exercises Provided: Pt instructed to continue prior HEP.  Exercises were reviewed and Nga was able to demonstrate them prior to the end of the session.  Nga demonstrated fair  understanding of the home exercise program provided. See electronic medical record under Patient Instructions for exercises provided during therapy sessions.       Assessment     Nga with improvement in her left shoulder motion as noted above.  Improvement in functional use of left arm with daily tasks    Nga is progressing slowly towards her goals and there are no updates to goals at this time. Pt prognosis is Fair.     Patient will continue to benefit from skilled outpatient occupational therapy to address the deficits listed in the problem list on initial evaluation provide patient/family education and to maximize patient's level of independence in the home and community environment.     Patient's spiritual, cultural and educational needs considered and patient agreeable to plan of care and goals.    Anticipated barriers to occupational therapy: compliance with HEP    Goals:  Goals to be met in 11/6/24 weeks:  1) Pt will be independent and report performing HEP to maximize (R) shoulder functional capacity.-progressing  2) Pt will increase shoulder AROM in all measured planes of motion by 10 degrees to A with daily task.-met with all but external rotation and extensions only-11/4/24  3) Pt " will report use of home modalities for pain management.  4) Pt will report pain at it's worse 5/10- improving      Long term goals met by discharge  Pt will increase active range of motion of shoulder elevation to 120 for self care tasks- met 12/4/24  Pt will report NC of 2 out 10 in left shoulder with use- met 12/4/24  Pt will continue with her shoulder exercises 3 x week at her gym    Plan   Performance of stretching, active and progressing to resistive exercises as tolerated.  Modalities prn to assist with pain.  Updates/Grading for next session: none    JEREMY Shepherd   12/4/2024

## 2024-12-04 ENCOUNTER — CLINICAL SUPPORT (OUTPATIENT)
Dept: REHABILITATION | Facility: HOSPITAL | Age: 69
End: 2024-12-04
Payer: MEDICARE

## 2024-12-04 DIAGNOSIS — Z74.1 SELF-CARE DEFICIT FOR DRESSING AND GROOMING: ICD-10-CM

## 2024-12-04 DIAGNOSIS — R29.898 WEAKNESS OF LEFT SHOULDER: ICD-10-CM

## 2024-12-04 DIAGNOSIS — M25.612 DECREASED RANGE OF MOTION OF LEFT SHOULDER: Primary | ICD-10-CM

## 2024-12-04 PROCEDURE — 97110 THERAPEUTIC EXERCISES: CPT | Mod: PN

## 2024-12-10 NOTE — PROGRESS NOTES
[facility]  Occupational Therapy Treatment Note     Date: 12/11/2024  Name: Nga Livingston  Clinic Number: [id number]    Therapy Diagnosis:   1. Decreased range of motion of left shoulder        2. Weakness of left shoulder        3. Self-care deficit for dressing and grooming          Physician: Elaine Mahmood MD    Physician Orders: Eval and Treat  Medical Diagnosis: Chronic left shoulder pain   Surgical Procedure and Date: n/a  Evaluation Date: 10/09/2024   Insurance Authorization Period Expiration: 11/30/24  Plan of Care Certification Period: 10/09/2024 - 12/18/24  Date of Return to MD: 11/4/24     Visit # / Visits authorized: 9/10    Time In:  7:45 am  Time Out:  8:30 am  Total Billable Time: 45  minutes     Precautions:  HBP    Subjective     Patient reports: I am not having pain in my shoulder.  She was compliant with home exercise program provided   Response to previous treatment: no ill effects  Functional change: I can wash my back with my left arm now     Pain: 0/10  Location: left shoulder      Objective     Objective Measures updated at progress report unless specified.    Range of Motion:                                         10/9/24       11/4/24          12/4/24        10/9/24    (L) UE (L) UE (L) UE (R) UE       AROM AROM AROM AROM Norm   Shoulder Flexion 105 122 126 145 180   Shoulder Abduction 98 138 148 147 0-180   Shoulder Extension 28 30 40 35 0-50   Shoulder Internal Rotation Upper glut L3 L1 T12 0-90   Shoulder External Rotation 37 23 67 65 0-90   Horz Shoulder Adduction -7 10 15 17 0-40      Treatment     Nga received the treatments listed below:      therapeutic exercises to develop strength, ROM, and flexibility for 45 minutes including: assessment and exercise.  Sci fit with Bilateral upper extremity x 6 min  Yellow thera tubing shoulder extension x 10 reps                                 Left shoulder adduction x 10 reps  Scapular stabilization on wall yellow  band step outs  right and left 2 sets 10 reps each  Matrix rows 15# x 10 reps    Landmine 1# x 20 reps left shoulder  Prone left shoulder extension with external rotation x 10 reps                  Row x 10 reps                                                                                                                                                       Pt declined modalities post therapy.     Patient Education and Home Exercises     Education provided:   - strongly encouraged her to perform her entire HEP  - Progress towards goals     Written Home Exercises Provided: Pt instructed to continue prior HEP.  Exercises were reviewed and Nga was able to demonstrate them prior to the end of the session.  Nga demonstrated fair  understanding of the home exercise program provided. See electronic medical record under Patient Instructions for exercises provided during therapy sessions.       Assessment     Nga with good performance of new exercises in clinic today.  She is no longer having pain in her left shoulder and is engaging in tasks at home     Nga is progressing slowly towards her goals and there are no updates to goals at this time. Pt prognosis is Fair.     Patient will continue to benefit from skilled outpatient occupational therapy to address the deficits listed in the problem list on initial evaluation provide patient/family education and to maximize patient's level of independence in the home and community environment.     Patient's spiritual, cultural and educational needs considered and patient agreeable to plan of care and goals.    Anticipated barriers to occupational therapy: compliance with HEP    Goals:  Goals to be met in 11/6/24 weeks:  1) Pt will be independent and report performing HEP to maximize (R) shoulder functional capacity.-progressing  2) Pt will increase shoulder AROM in all measured planes of motion by 10 degrees to A with daily task.-met with all but external rotation and  extensions only-11/4/24  3) Pt will report use of home modalities for pain management.  4) Pt will report pain at it's worse 5/10- improving      Long term goals met by discharge  Pt will increase active range of motion of shoulder elevation to 120 for self care tasks- met 12/4/24  Pt will report IN of 2 out 10 in left shoulder with use- met 12/4/24  Pt will continue with her shoulder exercises 3 x week at her gym    Plan   Performance of stretching, active and progressing to resistive exercises as tolerated.  Modalities prn to assist with pain.  Updates/Grading for next session: none    JEREMY Shepherd   12/11/2024

## 2024-12-11 ENCOUNTER — CLINICAL SUPPORT (OUTPATIENT)
Dept: REHABILITATION | Facility: HOSPITAL | Age: 69
End: 2024-12-11
Payer: MEDICARE

## 2024-12-11 DIAGNOSIS — M25.612 DECREASED RANGE OF MOTION OF LEFT SHOULDER: Primary | ICD-10-CM

## 2024-12-11 DIAGNOSIS — R29.898 WEAKNESS OF LEFT SHOULDER: ICD-10-CM

## 2024-12-11 DIAGNOSIS — Z74.1 SELF-CARE DEFICIT FOR DRESSING AND GROOMING: ICD-10-CM

## 2024-12-11 PROCEDURE — 97110 THERAPEUTIC EXERCISES: CPT | Mod: CK,PN

## 2024-12-17 NOTE — PROGRESS NOTES
[facility]  Occupational Therapy Treatment Note  / Discharge Summary     Date: 12/18/2024  Name: Nga Livingston  Clinic Number: [id number]    Therapy Diagnosis:   1. Decreased range of motion of left shoulder        2. Weakness of left shoulder        3. Self-care deficit for dressing and grooming            Physician: Elaine Mahmood MD    Physician Orders: Eval and Treat  Medical Diagnosis: Chronic left shoulder pain   Surgical Procedure and Date: n/a  Evaluation Date: 10/09/2024   Insurance Authorization Period Expiration: 11/30/24  Plan of Care Certification Period: 10/09/2024 - 12/18/24  Date of Return to MD: 11/4/24     Visit # / Visits authorized: 10/10    Time In:  7:47 am  Time Out:  8:30 am  Total Billable Time: 43  minutes     Precautions:  HBP    Date of last visit:  12/18/2024   Total visits attended:  10  Subjective     Patient reports: Everything is doing good.  She was compliant with home exercise program provided   Response to previous treatment: felt good  Functional change: able to do all my work at home    Pain: 0/10  Location: left shoulder      Objective     Objective Measures updated at progress report unless specified.    Range of Motion:                                         10/9/24       11/4/24          12/4/24           12/18/24       10/9/24    (L) UE (L) UE (L) UE (L) UE (R) UE       AROM AROM AROM AROM AROM Norm   Shoulder Flexion 105 122 126 127 145 180   Shoulder Abduction 98 138 148 147 147 0-180   Shoulder Extension 28 30 40 31 35 0-50   Shoulder Internal Rotation Upper glut L3 L1 T11 T12 0-90   Shoulder External Rotation 37 23 67 60 65 0-90   Horz Shoulder Adduction -7 10 15 17 17 0-40      Treatment     Nga received the treatments listed below:      therapeutic exercises to develop strength, ROM, and flexibility for 43 minutes including: assessment and exercise.  Sci fit with Bilateral upper extremity x 6 min  Yellow thera tubing shoulder extension x 10  reps                                 Left shoulder adduction x 10 reps  Matrix rows 15# x 10 reps   Scapular stabilization on wall yellow band step outs  right and left 2 sets 10 reps each                                                 Forearm step ups x 10 reps   Landmine 2# x 20 reps left shoulder  Prone left shoulder extension with external rotation x 10 reps                  Row x 10 reps                                                                                                                                                       Pt declined modalities post therapy.     Patient Education and Home Exercises     Education provided:   - strongly encouraged her to perform her entire HEP  - Progress towards goals     Written Home Exercises Provided: Pt instructed to continue prior HEP.  Exercises were reviewed and Nga was able to demonstrate them prior to the end of the session.  Nga demonstrated fair  understanding of the home exercise program provided. See electronic medical record under Patient Instructions for exercises provided during therapy sessions.       Assessment     Nga with ability to complete home activities and work tasks as usual.  No reports of pain with left arm us and improvement in active motion of right arm.    Patient's spiritual, cultural and educational needs considered and patient agreeable to plan of care and goals.    Anticipated barriers to occupational therapy: compliance with HEP    Goals:  Goals to be met in 11/6/24 weeks:  1) Pt will be independent and report performing HEP to maximize (R) shoulder functional capacity.-progressing  2) Pt will increase shoulder AROM in all measured planes of motion by 10 degrees to A with daily task.-met with all but external rotation and extensions only-11/4/24  3) Pt will report use of home modalities for pain management.  4) Pt will report pain at it's worse 5/10- improving      Long term goals met by discharge  Pt will increase active  range of motion of shoulder elevation to 120 for self care tasks- met 12/4/24  Pt will report [place] of 2 out 10 in left shoulder with use- met 12/4/24  Pt will continue with her shoulder exercises 3 x week at her gym - met     Plan   Patient will be discharged from occupational therapy.    JEREMY Shepherd   12/18/2024

## 2024-12-18 ENCOUNTER — CLINICAL SUPPORT (OUTPATIENT)
Dept: REHABILITATION | Facility: HOSPITAL | Age: 69
End: 2024-12-18
Payer: MEDICARE

## 2024-12-18 DIAGNOSIS — Z74.1 SELF-CARE DEFICIT FOR DRESSING AND GROOMING: ICD-10-CM

## 2024-12-18 DIAGNOSIS — R29.898 WEAKNESS OF LEFT SHOULDER: ICD-10-CM

## 2024-12-18 DIAGNOSIS — M25.612 DECREASED RANGE OF MOTION OF LEFT SHOULDER: Primary | ICD-10-CM

## 2024-12-18 PROCEDURE — 97110 THERAPEUTIC EXERCISES: CPT | Mod: CK,PN

## 2025-03-19 ENCOUNTER — TELEPHONE (OUTPATIENT)
Dept: PRIMARY CARE CLINIC | Facility: CLINIC | Age: 70
End: 2025-03-19
Payer: MEDICARE

## 2025-03-19 DIAGNOSIS — Z12.31 ENCOUNTER FOR SCREENING MAMMOGRAM FOR BREAST CANCER: Primary | ICD-10-CM

## 2025-03-19 NOTE — TELEPHONE ENCOUNTER
----- Message from Leti sent at 3/19/2025 10:27 AM CDT -----  Contact: [phone]  Caller is requesting to schedule their annual screening mammogram appointment. Order is not listed in Epic.  Please enter order and contact patient to schedule.Would the patient like a call back, or a response through their [facility] portal?:   Call back Where would they like the mammogram performed?:[facility] Comments:

## 2025-03-19 NOTE — TELEPHONE ENCOUNTER
Mammo orders in. Left vm and portal for patient to either call scheduling , or let us know when she would like to go.

## 2025-03-21 ENCOUNTER — HOSPITAL ENCOUNTER (OUTPATIENT)
Dept: RADIOLOGY | Facility: HOSPITAL | Age: 70
Discharge: HOME | End: 2025-03-21
Attending: INTERNAL MEDICINE
Payer: MEDICARE

## 2025-03-21 VITALS — WEIGHT: 159.63 LBS | BODY MASS INDEX: 28.29 KG/M2 | HEIGHT: 63 IN

## 2025-03-21 DIAGNOSIS — Z12.31 ENCOUNTER FOR SCREENING MAMMOGRAM FOR BREAST CANCER: ICD-10-CM

## 2025-03-21 PROCEDURE — 77063 BREAST TOMOSYNTHESIS BI: CPT | Mod: 26,,, | Performed by: RADIOLOGY

## 2025-03-21 PROCEDURE — 77067 SCR MAMMO BI INCL CAD: CPT | Mod: 26,,, | Performed by: RADIOLOGY

## 2025-03-21 PROCEDURE — 77067 SCR MAMMO BI INCL CAD: CPT | Mod: TC

## 2025-03-25 ENCOUNTER — TELEPHONE (OUTPATIENT)
Dept: PRIMARY CARE CLINIC | Facility: CLINIC | Age: 70
End: 2025-03-25
Payer: MEDICARE

## 2025-03-25 NOTE — LETTER
March 27, 2025    Nga Carolyn Miki  [address] [place] [address]             [facility] 65+ - [facility]  Primary Care  [address] [address]   Dear Ms. Albertoda Miki:    We have been trying to contact you regarding your current insurance coverage, which is no longer in network with [facility]     65 + Accepted Insurance as of January 2025     We are not able to schedule any appointments with Dr. Mahmood without the accepted insurance ( see list below)      As of right now, there are two options.      1.Dr Mahmood can recommend some primary care physicians, and can help you get established with a new primary care physician within the regular [facility] system. Please let us know and we can send you a list of her recommendations.     2. You may switch to one of the accepted insurances listed below. If you opt to switch, you have until the end of March 2025.   1.[facility] [employer] [facility] Medicare Advantage Plans  2.[employer] [facility] HMO  3.[employer] Gold Plus HMO  4.[employer] Choice [employer] PPO  5.Hum[employer] Gold Plus Diabetes & Heart HMO SNP   6.Humana Gold Plus SNP [place] HMO   7.[employer] [facility] Medicare Advantage Plans  8.Blue Advantage HMO  9.Blue Advantage PPO         [facility] Medicare advantage number - [phone]   [employer] Medicare advantage number- [phone]   [employer] [facility] medicare advantage number - [phone]   Blue Advantage medicare advantage number [phone]

## 2025-03-25 NOTE — TELEPHONE ENCOUNTER
Called MsOtoniel Albertoda to inform her of her insurance change no answer sent a portal message as well.

## 2025-03-25 NOTE — TELEPHONE ENCOUNTER
Please call to notify pt that insurance will be out of network for 65+. Will need to change insurance or PCP.

## 2025-03-31 DIAGNOSIS — R05.3 CHRONIC COUGH: ICD-10-CM

## 2025-03-31 RX ORDER — IPRATROPIUM BROMIDE 21 UG/1
1 SPRAY, METERED NASAL 2 TIMES DAILY
Qty: 30 ML | Refills: 3 | Status: SHIPPED | OUTPATIENT
Start: 2025-03-31

## 2025-04-01 ENCOUNTER — TELEPHONE (OUTPATIENT)
Dept: PRIMARY CARE CLINIC | Facility: CLINIC | Age: 70
End: 2025-04-01
Payer: MEDICARE

## 2025-04-01 NOTE — TELEPHONE ENCOUNTER
----- Message from Josette sent at 4/1/2025  9:33 AM CDT -----  Contact: [phone]  Pt states she received a letter in the mail that Dr Mahmood is no longer accepting [employer] Medicare. I informed her I have no information on provider no longer accepting that insurance and am under in impression that provider is still accepting [employer]. Pt would like a call to discuss this or be referred to another provider if her insurance will not be accepted. Please call pt. Thanks

## 2025-04-08 ENCOUNTER — TELEPHONE (OUTPATIENT)
Dept: PRIMARY CARE CLINIC | Facility: CLINIC | Age: 70
End: 2025-04-08
Payer: MEDICARE

## 2025-04-08 NOTE — TELEPHONE ENCOUNTER
----- Message from Viky sent at 4/8/2025  2:39 PM CDT -----  Contact: Pt [phone]  Would like to receive medical advice.Would they like a call back or a response via [facility]:  portal messageAdditional information:  Pt is asking if Dr. Mahmood can recommend a few doctors for her to choose from for a new PCP.  She said she can no longer see Dr. Mahmood due to her insurance.

## 2025-05-15 DIAGNOSIS — M81.0 SENILE OSTEOPOROSIS: ICD-10-CM

## 2025-05-15 RX ORDER — ALENDRONATE SODIUM 70 MG/1
TABLET ORAL
Qty: 12 TABLET | Refills: 3 | Status: SHIPPED | OUTPATIENT
Start: 2025-05-15

## 2025-05-16 ENCOUNTER — TELEPHONE (OUTPATIENT)
Dept: ENDOSCOPY | Facility: HOSPITAL | Age: 70
End: 2025-05-16

## 2025-05-16 ENCOUNTER — OFFICE VISIT (OUTPATIENT)
Dept: FAMILY MEDICINE | Facility: CLINIC | Age: 70
End: 2025-05-16
Payer: MEDICARE

## 2025-05-16 ENCOUNTER — CLINICAL SUPPORT (OUTPATIENT)
Dept: ENDOSCOPY | Facility: HOSPITAL | Age: 70
End: 2025-05-16
Attending: FAMILY MEDICINE
Payer: MEDICARE

## 2025-05-16 VITALS
HEART RATE: 69 BPM | TEMPERATURE: 99 F | WEIGHT: 157.88 LBS | HEIGHT: 63 IN | DIASTOLIC BLOOD PRESSURE: 50 MMHG | SYSTOLIC BLOOD PRESSURE: 110 MMHG | OXYGEN SATURATION: 99 % | BODY MASS INDEX: 27.97 KG/M2

## 2025-05-16 DIAGNOSIS — E78.5 HYPERLIPIDEMIA, UNSPECIFIED HYPERLIPIDEMIA TYPE: ICD-10-CM

## 2025-05-16 DIAGNOSIS — Z12.12 SCREENING FOR COLORECTAL CANCER: ICD-10-CM

## 2025-05-16 DIAGNOSIS — Z12.11 SPECIAL SCREENING FOR MALIGNANT NEOPLASMS, COLON: Primary | ICD-10-CM

## 2025-05-16 DIAGNOSIS — Z12.11 SCREENING FOR COLORECTAL CANCER: Primary | ICD-10-CM

## 2025-05-16 DIAGNOSIS — I10 BENIGN ESSENTIAL HYPERTENSION: ICD-10-CM

## 2025-05-16 DIAGNOSIS — Z12.11 SCREENING FOR COLORECTAL CANCER: ICD-10-CM

## 2025-05-16 DIAGNOSIS — Z12.12 SCREENING FOR COLORECTAL CANCER: Primary | ICD-10-CM

## 2025-05-16 DIAGNOSIS — M81.0 SENILE OSTEOPOROSIS: ICD-10-CM

## 2025-05-16 PROCEDURE — 99999 PR PBB SHADOW E&M-EST. PATIENT-LVL IV: CPT | Mod: PBBFAC,,, | Performed by: FAMILY MEDICINE

## 2025-05-16 RX ORDER — HYDROCHLOROTHIAZIDE 12.5 MG/1
12.5 TABLET ORAL DAILY
Qty: 90 TABLET | Refills: 3 | Status: SHIPPED | OUTPATIENT
Start: 2025-05-16

## 2025-05-16 RX ORDER — FLUOROMETHOLONE 1 MG/ML
SUSPENSION/ DROPS OPHTHALMIC
COMMUNITY
Start: 2025-02-17

## 2025-05-16 RX ORDER — CARVEDILOL 25 MG/1
TABLET ORAL
Qty: 180 TABLET | Refills: 3 | Status: SHIPPED | OUTPATIENT
Start: 2025-05-16

## 2025-05-16 RX ORDER — OLMESARTAN MEDOXOMIL 20 MG/1
20 TABLET ORAL DAILY
Qty: 90 TABLET | Refills: 3 | Status: SHIPPED | OUTPATIENT
Start: 2025-05-16

## 2025-05-16 RX ORDER — CARVEDILOL 25 MG/1
TABLET ORAL
Qty: 180 TABLET | Refills: 3 | Status: CANCELLED | OUTPATIENT
Start: 2025-05-16

## 2025-05-16 RX ORDER — ROSUVASTATIN CALCIUM 40 MG/1
40 TABLET, COATED ORAL NIGHTLY
Status: CANCELLED
Start: 2025-05-16

## 2025-05-16 RX ORDER — ROSUVASTATIN CALCIUM 40 MG/1
40 TABLET, COATED ORAL NIGHTLY
Start: 2025-05-16

## 2025-05-16 RX ORDER — ALENDRONATE SODIUM 70 MG/1
TABLET ORAL
Qty: 12 TABLET | Refills: 3 | Status: CANCELLED | OUTPATIENT
Start: 2025-05-16

## 2025-05-16 NOTE — PROGRESS NOTES
Subjective     Patient ID: Nga Livingston is a 69 y.o. female.    Chief Complaint: Establish Care    69 year old female presents to [facility]. She needs another PCP as her insurance is not accepted by her other PCP. She needs refills of her medication    Hypertension  This is a chronic problem. The current episode started more than 1 year ago. The problem is unchanged. The problem is controlled. Pertinent negatives include no chest pain, headaches, palpitations or shortness of breath. Past treatments include beta blockers and diuretics. The current treatment provides significant improvement.       History of Present Illness             Past Medical History:   Diagnosis Date    Depression     GERD (gastroesophageal reflux disease)     High blood cholesterol     Hypertension     Osteoporosis 03/26/2015    outside records from [facility] T score of L2 is -2.5      Past Surgical History:   Procedure Laterality Date    CYSTOSCOPY      ESOPHAGOGASTRODUODENOSCOPY N/A 08/14/2018    Procedure: ESOPHAGOGASTRODUODENOSCOPY (EGD);  Surgeon: Jordan Kraft MD;  Location: [address]);  Service: Endoscopy;  Laterality: N/A;    TUBAL LIGATION       Family History   Problem Relation Name Age of Onset    Hypertension Mother      Stroke Mother      Hypothyroidism Mother      Diabetes Sister      Hypothyroidism Sister      Breast cancer Sister      Cancer Sister          bone cancer    Hypothyroidism Maternal Aunt      Diabetes Maternal Uncle      Hypothyroidism Maternal Uncle      Breast cancer Paternal Aunt      Breast cancer Maternal Cousin      Breast cancer Maternal Cousin      Colon cancer Neg Hx       Social History[1]      Review of Systems   Constitutional:  Negative for fatigue.   Respiratory:  Negative for cough, chest tightness and shortness of breath.    Cardiovascular:  Negative for chest pain, palpitations and leg swelling.   Gastrointestinal:  Negative for abdominal pain, diarrhea, nausea and vomiting.  "  Endocrine: Negative for polydipsia and polyuria.   Neurological:  Negative for dizziness, syncope, light-headedness and headaches.            Objective     Vitals:    05/16/25 1414   BP: (!) 110/50   Pulse: 69   Temp: 99 °F (37.2 °C)   TempSrc: Oral   SpO2: 99%   Weight: 71.6 kg (157 lb 13.6 oz)   Height: 5' 3" (1.6 m)        Physical Exam  Physical Exam                Assessment and Plan     1. Screening for colorectal cancer  -     Ambulatory referral/consult to Endo Procedure ; Future; Expected date: 05/17/2025    2. Senile osteoporosis  -     CBC Auto Differential; Future; Expected date: 05/16/2025  -     Comprehensive Metabolic Panel; Future; Expected date: 05/16/2025    3. Benign essential hypertension  -     CBC Auto Differential; Future; Expected date: 05/16/2025  -     Comprehensive Metabolic Panel; Future; Expected date: 05/16/2025  -     Lipid Panel; Future; Expected date: 05/16/2025  -     TSH; Future; Expected date: 05/16/2025  -     carvediloL (COREG) 25 MG tablet; TAKE 1 TABLET  2  TIMES DAILY WITH MEALS.  Dispense: 180 tablet; Refill: 3  -     hydroCHLOROthiazide 12.5 MG Tab; Take 1 tablet (12.5 mg total) by mouth once daily.  Dispense: 90 tablet; Refill: 3  -     olmesartan (BENICAR) 20 MG tablet; Take 1 tablet (20 mg total) by mouth once daily.  Dispense: 90 tablet; Refill: 3  Stable. Refilled meds and due for labs    4. Hyperlipidemia, unspecified hyperlipidemia type  -     CBC Auto Differential; Future; Expected date: 05/16/2025  -     Comprehensive Metabolic Panel; Future; Expected date: 05/16/2025  -     Lipid Panel; Future; Expected date: 05/16/2025  -     TSH; Future; Expected date: 05/16/2025  -     rosuvastatin (CRESTOR) 40 MG Tab; Take 1 tablet (40 mg total) by mouth every evening.    Mammogram done in 3/2025 and it was normal.   [place] 5/2023- ZACH Sylvester was seen today for establish care.    Diagnoses and all orders for this visit:    Screening for colorectal cancer  -     " Ambulatory referral/consult to Endo Procedure ; Future    Senile osteoporosis  -     CBC Auto Differential; Future  -     Comprehensive Metabolic Panel; Future    Benign essential hypertension  -     CBC Auto Differential; Future  -     Comprehensive Metabolic Panel; Future  -     Lipid Panel; Future  -     TSH; Future  -     carvediloL (COREG) 25 MG tablet; TAKE 1 TABLET  2  TIMES DAILY WITH MEALS.  -     hydroCHLOROthiazide 12.5 MG Tab; Take 1 tablet (12.5 mg total) by mouth once daily.  -     olmesartan (BENICAR) 20 MG tablet; Take 1 tablet (20 mg total) by mouth once daily.    Hyperlipidemia, unspecified hyperlipidemia type  -     CBC Auto Differential; Future  -     Comprehensive Metabolic Panel; Future  -     Lipid Panel; Future  -     TSH; Future  -     rosuvastatin (CRESTOR) 40 MG Tab; Take 1 tablet (40 mg total) by mouth every evening.    Other orders  The following orders have not been finalized:  -     Cancel: alendronate (FOSAMAX) 70 MG tablet  -     Cancel: carvediloL (COREG) 25 MG tablet  -     Cancel: rosuvastatin (CRESTOR) 40 MG Tab        Assessment & Plan                      No follow-ups on file.        This note was generated with the assistance of ambient listening technology. Verbal consent was obtained by the patient and accompanying visitor(s) for the recording of patient appointment to facilitate this note. I attest to having reviewed and edited the generated note for accuracy, though some syntax or spelling errors may persist. Please contact the author of this note for any clarification.         [1]   Social History  Socioeconomic History    Marital status: Single    Number of children: 0   Occupational History    Occupation: retired teacher   Tobacco Use    Smoking status: Former     Current packs/day: 0.00     Average packs/day: 0.3 packs/day for 10.0 years (3.0 ttl pk-yrs)     Types: Cigarettes     Start date: 1990     Quit date: 2000     Years since quittin.1     Smokeless tobacco: Never   Substance and Sexual Activity    Alcohol use: Yes     Alcohol/week: 2.0 standard drinks of alcohol     Types: 2 Glasses of wine per week    Drug use: No    Sexual activity: Not Currently     Partners: Male     Comment: Has not been sexually active in over 10 years   Social History Narrative    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     Social Drivers of Health     Financial Resource Strain: Medium Risk (3/25/2024)    Overall Financial Resource Strain (CARDIA)     Difficulty of Paying Living Expenses: Somewhat hard   Food Insecurity: Food Insecurity Present (3/25/2024)    Hunger Vital Sign     Worried About Running Out of Food in the Last Year: Sometimes true     Ran Out of Food in the Last Year: Sometimes true   Transportation Needs: No Transportation Needs (3/25/2024)    PRAPARE - Transportation     Lack of Transportation (Medical): No     Lack of Transportation (Non-Medical): No   Physical Activity: Insufficiently Active (3/25/2024)    Exercise Vital Sign     Days of Exercise per Week: 4 days     Minutes of Exercise per Session: 30 min   Stress: No Stress Concern Present (3/25/2024)    [employer] - Occupational Stress Questionnaire     Feeling of Stress : Not at all   Housing Stability: Unknown (3/25/2024)    Housing Stability Vital Sign     Unable to Pay for Housing in the Last Year: Patient declined     Unstable Housing in the Last Year: No

## 2025-05-16 NOTE — PATIENT INSTRUCTIONS
Colonoscopy Procedure Prep Instructions    Date of procedure: 07/18/2025 Arrive at: 09:00 AM    Location of Department:   [facility] [place] [address]  Take the Elevators to 2nd Floor Endoscopy Procedural Area    As soon as possible:   your prep from pharmacy and over the counter DULCOLAX LAXATIVE TABLETS            On the day before your procedure   What You CAN do:   You may have clear liquids ONLY-see below for list.     Liquids That Are OK to Drink:   Water  Sports drinks (Gatorade, Power-Aid)  Coffee or tea (no cream or nondairy creamer)  Clear juices without pulp (apple, white grape)  Gelatin desserts (no fruit or toppings)  Clear soda (sprite, coke, ginger ale)  Chicken broth (until 12 midnight the night before procedure)    What You CANNOT do:   Do not EAT solid food, drink milk or anything   colored red.  Do not drink alcohol.  Do not take oral medications within 1 hour of starting   each dose of prep.  No gum chewing or candy morning of procedure                       Note:   (Please disregard the insert instructions from pharmacy).  PEG Bowel Prep is indicated for cleansing of the colon as a preparation for colonoscopy in adults.   Be sure to tell your doctor about all the medicines you take, including prescription and non-prescription medicines, vitamins, and herbal supplements. PEG Bowel Prep may affect how other medicines work.  Medication taken by mouth may not be absorbed properly when taken within 1 hour before the start of each dose of PEG Bowel Prep.    It is not uncommon to experience some abdominal cramping, nausea and/or vomiting when taking the prep. If you have nausea and/or vomiting while taking the prep, stop drinking for 20 to 30 minutes then continue.      How to take prep:    PEG Bowel Prep is a (2-day) prep.    One (1) bottle of prep are required for a complete preparation for colonoscopy. Dilute the solution concentrate as directed prior  to use. You must drink water with each dose of prep, and additional water after each dose.    DOSE 1--Day Before Colonoscopy 07/17/2025     Drink at least 6 to 8 glasses of clear liquids from time you wake up until you begin your prep and then continue until bedtime to avoid dehydration.     12:00 pm (NOON) Mix your entire container of prep with lukewarm water and refrigerate. Take four (4) Dulcolax (Bisacodyl) tablets with at least 8 ounces or more of clear liquids.       6:00 pm:    You must complete Steps 1 and 2 below before going to bed:    Step 1-Drink half the liquid in the container within one (1) hour.   Step 2-Refrigerate the remaining half of the liquid for dose 2. See below when to begin this step.                       IMPORTANT: If you experience preparation-related symptoms (for example, nausea, bloating, or cramping), stop, or slow the rate of drinking the additional water until your symptoms decrease.    DOSE 2--Day of the Colonoscopy 07/18/2025 at 2-3 AM.    For this dose, repeat Step 1 shown above using the remaining half of the liquid prep.   You may continue drinking water/clear liquids until   4 hours before your colonoscopy or as directed by the scheduling nurse  06:00 AM.      For information about your procedure, two (2) things to view prior to colonoscopy:  Please watch this informational video. It is important to watch this animated consent video prior to your arrival. If you haven't watched the video prior to arriving, you are required to watch it during admission which can causes delays.    Options for viewing:   Using a keyboard:  press and hold the control tab (Ctrl) and left mouse click to follow links.           Colonoscopy Instructional Video                                                                                   OR    Type link address into your web browser's address bar:  [email or web address]      Educational Booklet with  pictures:      Colonoscopy Prep - Liquid      Comments:        IMPORTANT INFORMATION TO KNOW BEFORE YOUR PROCEDURE    [facility] 2nd Floor       When you arrive:  If your procedure is Monday - Friday 5am - 7pm - Please enter through the front door near [facility]. Please proceed up the first set of elevators to the 2nd floor where you will check in at the endo registration desk.     If your procedure is on a Saturday (weekend), enter through the back Outpatient entrance. Please note this entrance is diagonal from the Emergency Department entrance.              If your procedure requires the administration of anesthesia, it is necessary for a responsible adult to drive you home. (Medical Transportation, Uber, Lyft, Taxi, etc. may ONLY be used if a responsible adult is present to accompany you home.  The responsible adult CAN'T be the  of the service).      person must be available to return to pick you up within 15 minutes of being notified of discharge.       Please bring a picture ID, insurance card, & copayment      Take Medications as directed below:        If you begin taking any blood thinning medications, injectable weight loss/diabetes medications (other than insulin) , Adipex (Phentermine) , please contact the endoscopy scheduling department listed below as soon as possible.    If you are diabetic see the attached instruction sheet regarding your medication.     If you take HEART, BLOOD PRESSURE, SEIZURE, PAIN, LUNG (including inhalers/nebulizers), ANTI-REJECTION (transplant patients), or PSYCHIATRIC medications, please take at your regular times with a sip of water or as directed by the scheduling nurse.     Important contact information:    Endoscopy [phone] Hours of operation Monday-Friday 8:00-4:30pm.    Questions about insurance or financial obligations call [phone] or [phone].    If you have questions regarding the prep or need  to reschedule, please call [phone]. After hours questions requiring immediate assistance, contact [facility] nurse line at [phone] or [phone].   NOTE:     On occasion, unforeseen circumstances may cause a delay in your procedure start time. We respect your time and appreciate your patience during these circumstances.      Comments:

## 2025-05-16 NOTE — PLAN OF CARE
Patient is scheduled for a Colonoscopy on 07/18/2025 with Dr. SHAHRZAD Cardenas  Referral for procedure from PAT appointment

## 2025-05-20 ENCOUNTER — LAB VISIT (OUTPATIENT)
Dept: LAB | Facility: HOSPITAL | Age: 70
End: 2025-05-20
Attending: FAMILY MEDICINE
Payer: MEDICARE

## 2025-05-20 ENCOUNTER — TELEPHONE (OUTPATIENT)
Dept: FAMILY MEDICINE | Facility: CLINIC | Age: 70
End: 2025-05-20
Payer: MEDICARE

## 2025-05-20 DIAGNOSIS — I10 BENIGN ESSENTIAL HYPERTENSION: ICD-10-CM

## 2025-05-20 DIAGNOSIS — M81.0 SENILE OSTEOPOROSIS: ICD-10-CM

## 2025-05-20 DIAGNOSIS — E78.5 HYPERLIPIDEMIA, UNSPECIFIED HYPERLIPIDEMIA TYPE: ICD-10-CM

## 2025-05-20 LAB
ABSOLUTE EOSINOPHIL (OHS): 0.08 K/UL
ABSOLUTE MONOCYTE (OHS): 0.53 K/UL (ref 0.3–1)
ABSOLUTE NEUTROPHIL COUNT (OHS): 2.63 K/UL (ref 1.8–7.7)
ALBUMIN SERPL BCP-MCNC: 3.9 G/DL (ref 3.5–5.2)
ALP SERPL-CCNC: 51 UNIT/L (ref 40–150)
ALT SERPL W/O P-5'-P-CCNC: 20 UNIT/L (ref 10–44)
ANION GAP (OHS): 12 MMOL/L (ref 8–16)
AST SERPL-CCNC: 22 UNIT/L (ref 11–45)
BASOPHILS # BLD AUTO: 0.04 K/UL
BASOPHILS NFR BLD AUTO: 0.8 %
BILIRUB SERPL-MCNC: 0.5 MG/DL (ref 0.1–1)
BUN SERPL-MCNC: 11 MG/DL (ref 8–23)
CALCIUM SERPL-MCNC: 9.1 MG/DL (ref 8.7–10.5)
CHLORIDE SERPL-SCNC: 102 MMOL/L (ref 95–110)
CHOLEST SERPL-MCNC: 135 MG/DL (ref 120–199)
CHOLEST/HDLC SERPL: 3.6 {RATIO} (ref 2–5)
CO2 SERPL-SCNC: 26 MMOL/L (ref 23–29)
CREAT SERPL-MCNC: 0.8 MG/DL (ref 0.5–1.4)
ERYTHROCYTE [DISTWIDTH] IN BLOOD BY AUTOMATED COUNT: 14.4 % (ref 11.5–14.5)
GFR SERPLBLD CREATININE-BSD FMLA CKD-EPI: >60 ML/MIN/1.73/M2
GLUCOSE SERPL-MCNC: 96 MG/DL (ref 70–110)
HCT VFR BLD AUTO: 36.2 % (ref 37–48.5)
HDLC SERPL-MCNC: 37 MG/DL (ref 40–75)
HDLC SERPL: 27.4 % (ref 20–50)
HGB BLD-MCNC: 11.6 GM/DL (ref 12–16)
IMM GRANULOCYTES # BLD AUTO: 0.01 K/UL (ref 0–0.04)
IMM GRANULOCYTES NFR BLD AUTO: 0.2 % (ref 0–0.5)
LDLC SERPL CALC-MCNC: 79.6 MG/DL (ref 63–159)
LYMPHOCYTES # BLD AUTO: 1.43 K/UL (ref 1–4.8)
MCH RBC QN AUTO: 30.9 PG (ref 27–31)
MCHC RBC AUTO-ENTMCNC: 32 G/DL (ref 32–36)
MCV RBC AUTO: 97 FL (ref 82–98)
NONHDLC SERPL-MCNC: 98 MG/DL
NUCLEATED RBC (/100WBC) (OHS): 0 /100 WBC
PLATELET # BLD AUTO: 210 K/UL (ref 150–450)
PMV BLD AUTO: 10.3 FL (ref 9.2–12.9)
POTASSIUM SERPL-SCNC: 3.7 MMOL/L (ref 3.5–5.1)
PROT SERPL-MCNC: 7.1 GM/DL (ref 6–8.4)
RBC # BLD AUTO: 3.75 M/UL (ref 4–5.4)
RELATIVE EOSINOPHIL (OHS): 1.7 %
RELATIVE LYMPHOCYTE (OHS): 30.3 % (ref 18–48)
RELATIVE MONOCYTE (OHS): 11.2 % (ref 4–15)
RELATIVE NEUTROPHIL (OHS): 55.8 % (ref 38–73)
SODIUM SERPL-SCNC: 140 MMOL/L (ref 136–145)
TRIGL SERPL-MCNC: 92 MG/DL (ref 30–150)
TSH SERPL-ACNC: 0.99 UIU/ML (ref 0.4–4)
WBC # BLD AUTO: 4.72 K/UL (ref 3.9–12.7)

## 2025-05-20 PROCEDURE — 36415 COLL VENOUS BLD VENIPUNCTURE: CPT | Mod: PO

## 2025-05-20 PROCEDURE — 80061 LIPID PANEL: CPT

## 2025-05-20 PROCEDURE — 85025 COMPLETE CBC W/AUTO DIFF WBC: CPT

## 2025-05-20 PROCEDURE — 84443 ASSAY THYROID STIM HORMONE: CPT

## 2025-05-20 PROCEDURE — 80053 COMPREHEN METABOLIC PANEL: CPT

## 2025-05-20 RX ORDER — ALENDRONATE SODIUM 70 MG/1
70 TABLET ORAL
Qty: 12 TABLET | Refills: 3 | Status: SHIPPED | OUTPATIENT
Start: 2025-05-20

## 2025-05-20 NOTE — TELEPHONE ENCOUNTER
----- Message from Deshaun sent at 5/20/2025  1:37 PM CDT -----  Regarding: self  Type: Patient Call BackWho called:selfWhat is the request in detail: Patient requesting a refill of her alendronate (FOSAMAX) 70 MG tablet [facility] Mail [facility] - [place] [facility] [address] [facility] [address] [facility] [address]Gltpu: [phone] Fax: [phone] the clinic reply by [facility]? NoWould the patient rather a call back or a response via [facility]? Call [facility] call back number:[phone] Information:Thank you.

## 2025-05-20 NOTE — TELEPHONE ENCOUNTER
----- Message from Deshaun sent at 5/20/2025  1:35 PM CDT -----  Regarding: self  Type: Patient Call BackWho called:selfWhat is the request in detail: Patient needs a refill of her alendronate (FOSAMAX) 70 MG tablet Please send to:[facility] - [facility] [address] [facility] [facility] [address]Nchnh: [phone] Fax: [phone] the clinic reply by [facility]? NoWould the patient rather a call back or a response via [facility]? Call [facility] call back number:[phone] Information:Thank you.

## 2025-05-22 ENCOUNTER — RESULTS FOLLOW-UP (OUTPATIENT)
Dept: FAMILY MEDICINE | Facility: CLINIC | Age: 70
End: 2025-05-22

## 2025-06-09 ENCOUNTER — TELEPHONE (OUTPATIENT)
Dept: GASTROENTEROLOGY | Facility: CLINIC | Age: 70
End: 2025-06-09
Payer: MEDICARE

## 2025-06-09 NOTE — TELEPHONE ENCOUNTER
MA returned call/spoke with patient. Let her know that her arrival time for her procedure is going to be 9am. Patient verbalized understanding and had no other questions at this time.

## 2025-06-09 NOTE — TELEPHONE ENCOUNTER
----- Message from Susanna sent at 6/9/2025 11:30 AM CDT -----  Name of Who is Calling:JOSHUA HAYWARD [[id number]]What is the request in detail: pt is requesting a call back regarding her upcoming colonoscopy 7/18/25. Pt would like to know the time or if she needs to come in early.Please contact to further discuss and advise.   Can the clinic reply by [facility]: call What Number to Call Back if not in [facility]:.[phone]

## 2025-07-15 ENCOUNTER — TELEPHONE (OUTPATIENT)
Dept: ENDOSCOPY | Facility: HOSPITAL | Age: 70
End: 2025-07-15
Payer: MEDICARE

## 2025-07-15 DIAGNOSIS — Z12.11 SPECIAL SCREENING FOR MALIGNANT NEOPLASMS, COLON: Primary | ICD-10-CM

## 2025-07-15 RX ORDER — POLYETHYLENE GLYCOL 3350, SODIUM SULFATE ANHYDROUS, SODIUM BICARBONATE, SODIUM CHLORIDE, POTASSIUM CHLORIDE 236; 22.74; 6.74; 5.86; 2.97 G/4L; G/4L; G/4L; G/4L; G/4L
4 POWDER, FOR SOLUTION ORAL ONCE
Qty: 4000 ML | Refills: 0 | Status: SHIPPED | OUTPATIENT
Start: 2025-07-15 | End: 2025-07-15

## 2025-07-15 NOTE — TELEPHONE ENCOUNTER
Received call from patient in regards to arrival time for upcoming colonoscopy and requesting prep medication be resent to pharmacy. Patient informed of arrival time of 9:00 AM and PEG prescription resent to preferred pharmacy on file in medical record. Patient verbalized understanding.

## 2025-07-17 ENCOUNTER — TELEPHONE (OUTPATIENT)
Dept: ENDOSCOPY | Facility: HOSPITAL | Age: 70
End: 2025-07-17
Payer: MEDICARE

## 2025-07-18 ENCOUNTER — ANESTHESIA EVENT (OUTPATIENT)
Dept: ENDOSCOPY | Facility: HOSPITAL | Age: 70
End: 2025-07-18
Payer: MEDICARE

## 2025-07-18 ENCOUNTER — HOSPITAL ENCOUNTER (OUTPATIENT)
Facility: HOSPITAL | Age: 70
Discharge: HOME | End: 2025-07-18
Attending: INTERNAL MEDICINE | Admitting: INTERNAL MEDICINE
Payer: MEDICARE

## 2025-07-18 ENCOUNTER — ANESTHESIA (OUTPATIENT)
Dept: ENDOSCOPY | Facility: HOSPITAL | Age: 70
End: 2025-07-18
Payer: MEDICARE

## 2025-07-18 VITALS
SYSTOLIC BLOOD PRESSURE: 130 MMHG | DIASTOLIC BLOOD PRESSURE: 67 MMHG | HEART RATE: 57 BPM | OXYGEN SATURATION: 100 % | TEMPERATURE: 98 F | RESPIRATION RATE: 18 BRPM

## 2025-07-18 DIAGNOSIS — Z86.0100 HISTORY OF COLON POLYPS: Primary | ICD-10-CM

## 2025-07-18 DIAGNOSIS — Z12.11 SCREENING FOR COLORECTAL CANCER: ICD-10-CM

## 2025-07-18 DIAGNOSIS — Z12.12 SCREENING FOR COLORECTAL CANCER: ICD-10-CM

## 2025-07-18 PROCEDURE — 37000008 HC ANESTHESIA 1ST 15 MINUTES: Performed by: INTERNAL MEDICINE

## 2025-07-18 PROCEDURE — 88305 TISSUE EXAM BY PATHOLOGIST: CPT | Mod: TC | Performed by: INTERNAL MEDICINE

## 2025-07-18 PROCEDURE — 45380 COLONOSCOPY AND BIOPSY: CPT | Mod: PT,,, | Performed by: INTERNAL MEDICINE

## 2025-07-18 PROCEDURE — 63600175 PHARM REV CODE 636 W HCPCS

## 2025-07-18 PROCEDURE — 45380 COLONOSCOPY AND BIOPSY: CPT | Mod: PT | Performed by: INTERNAL MEDICINE

## 2025-07-18 PROCEDURE — 37000009 HC ANESTHESIA EA ADD 15 MINS: Performed by: INTERNAL MEDICINE

## 2025-07-18 PROCEDURE — 25000003 PHARM REV CODE 250: Performed by: STUDENT IN AN ORGANIZED HEALTH CARE EDUCATION/TRAINING PROGRAM

## 2025-07-18 PROCEDURE — 27201012 HC FORCEPS, HOT/COLD, DISP: Performed by: INTERNAL MEDICINE

## 2025-07-18 RX ORDER — PROPOFOL 10 MG/ML
VIAL (ML) INTRAVENOUS
Status: DISCONTINUED
Start: 2025-07-18 | End: 2025-07-18 | Stop reason: HOSPADM

## 2025-07-18 RX ORDER — PROPOFOL 10 MG/ML
VIAL (ML) INTRAVENOUS
Status: DISCONTINUED | OUTPATIENT
Start: 2025-07-18 | End: 2025-07-18

## 2025-07-18 RX ORDER — LIDOCAINE HYDROCHLORIDE 20 MG/ML
INJECTION, SOLUTION EPIDURAL; INFILTRATION; INTRACAUDAL; PERINEURAL
Status: DISCONTINUED
Start: 2025-07-18 | End: 2025-07-18 | Stop reason: HOSPADM

## 2025-07-18 RX ORDER — LIDOCAINE HYDROCHLORIDE 20 MG/ML
INJECTION INTRAVENOUS
Status: DISCONTINUED | OUTPATIENT
Start: 2025-07-18 | End: 2025-07-18

## 2025-07-18 RX ADMIN — PROPOFOL 20 MG: 10 INJECTION, EMULSION INTRAVENOUS at 12:07

## 2025-07-18 RX ADMIN — LIDOCAINE HYDROCHLORIDE 60 MG: 20 INJECTION, SOLUTION INTRAVENOUS at 12:07

## 2025-07-18 RX ADMIN — PROPOFOL 40 MG: 10 INJECTION, EMULSION INTRAVENOUS at 12:07

## 2025-07-18 RX ADMIN — PROPOFOL 80 MG: 10 INJECTION, EMULSION INTRAVENOUS at 12:07

## 2025-07-18 RX ADMIN — SODIUM CHLORIDE: 0.9 INJECTION, SOLUTION INTRAVENOUS at 12:07

## 2025-07-18 NOTE — ANESTHESIA POSTPROCEDURE EVALUATION
Anesthesia Post Evaluation    Patient: Nga Livingston    Procedure(s) Performed: Procedure(s) (LRB):  COLONOSCOPY, SCREENING, HIGH RISK PATIENT (N/A)    Final Anesthesia Type: general      Patient location during evaluation: GI PACU  Patient participation: Yes- Able to Participate  Level of consciousness: awake and alert, oriented and awake  Post-procedure vital signs: reviewed and stable  Airway patency: patent    PONV status at discharge: No PONV  Anesthetic complications: no      Cardiovascular status: blood pressure returned to baseline  Respiratory status: unassisted, spontaneous ventilation and room air  Hydration status: euvolemic  Follow-up not needed.              Vitals Value Taken Time   /67 07/18/25 13:09   Temp 36.5 °C (97.7 °F) 07/18/25 12:39   Pulse 57 07/18/25 13:09   Resp 18 07/18/25 13:09   SpO2 100 % 07/18/25 13:09         Event Time   Out of Recovery 13:13:23         Pain/Olimpia Score: Olimpia Score: 10 (7/18/2025  1:09 PM)

## 2025-07-18 NOTE — TRANSFER OF CARE
Anesthesia Transfer of Care Note    Patient: Nga Livingston    Procedure(s) Performed: Procedure(s) (LRB):  COLONOSCOPY, SCREENING, HIGH RISK PATIENT (N/A)    Patient location: GI    Anesthesia Type: general    Transport from OR: Transported from OR on room air with adequate spontaneous ventilation    Post pain: adequate analgesia    Post assessment: no apparent anesthetic complications and tolerated procedure well    Post vital signs: stable    Level of consciousness: responds to stimulation and lethargic    Nausea/Vomiting: no nausea/vomiting    Complications: none    Transfer of care protocol was followed      Last vitals: Visit Vitals  BP (!) 113/52 (BP Location: Left arm, Patient Position: Lying)   Pulse 75   Temp 36.5 °C (97.7 °F) (Oral)   Resp 12   LMP 03/22/2007   SpO2 98%   Breastfeeding No

## 2025-07-18 NOTE — ANESTHESIA PREPROCEDURE EVALUATION
07/18/2025  Nga Livingston is a 69 y.o., female.    Past Medical History:   Diagnosis Date    Depression     GERD (gastroesophageal reflux disease)     High blood cholesterol     Hypertension     Osteoporosis 03/26/2015    outside records from [facility] - T score of L2 is -2.5       Pre-op Assessment    I have reviewed the Patient Summary Reports.     I have reviewed the Nursing Notes.    I have reviewed the Medications.     Review of Systems  Anesthesia Hx:  No problems with previous Anesthesia   Neg history of prior surgery.          Denies Family Hx of Anesthesia complications.    Denies Personal Hx of Anesthesia complications.                    Social:  Former Smoker, Social Alcohol Use       Hematology/Oncology:  Hematology Normal   Oncology Normal                                   EENT/Dental:  EENT/Dental Normal           Cardiovascular:  Exercise tolerance: good   Hypertension                                    Hypertension         Pulmonary:  Pulmonary Normal                       Renal/:  Renal/ Normal                 Hepatic/GI:     GERD         Gerd          Musculoskeletal:  Musculoskeletal Normal                Neurological:  Neurology Normal                                      Endocrine:  Endocrine Normal            Dermatological:  Skin Normal    Psych:  Psychiatric History                  Physical Exam  General: Well nourished    Airway:  Mallampati: II   Mouth Opening: Normal  Tongue: Normal  Neck ROM: Normal ROM    Dental:  Intact    Chest/Lungs:  Clear to auscultation    Heart:  Rate: Normal  Rhythm: Regular Rhythm  Sounds: Normal        Anesthesia Plan  Type of Anesthesia, risks & benefits discussed:    Anesthesia Type: Gen Natural Airway  Intra-op Monitoring Plan: Standard ASA Monitors  Induction:  IV  Informed Consent: Informed consent signed with the Patient and all parties  understand the risks and agree with anesthesia plan.  All questions answered.   ASA Score: 2    Ready For Surgery From Anesthesia Perspective.     .

## 2025-07-18 NOTE — PROVATION PATIENT INSTRUCTIONS
Discharge Summary/Instructions after an Endoscopic Procedure  Patient Name: Nga Livingston  Patient MRN: [id number]  Patient YOB: 1955 Friday, July 18, 2025  Monie Cardenas MD  Dear patient,  As a result of recent federal legislation (The Federal Cures Act), you may   receive lab or pathology results from your procedure in your [facility]   account before your physician is able to contact you. Your physician or   their representative will relay the results to you with their   recommendations at their soonest availability.  Thank you,  RESTRICTIONS:  During your procedure today, you received medications for sedation.  These   medications may affect your judgment, balance and coordination.  Therefore,   for 24 hours, you have the following restrictions:   - DO NOT drive a car, operate machinery, make legal/financial decisions,   sign important papers or drink alcohol.    ACTIVITY:  Today: no heavy lifting, straining or running due to procedural   sedation/anesthesia.  The following day: return to full activity including work.  DIET:  Eat and drink normally unless instructed otherwise.     TREATMENT FOR COMMON SIDE EFFECTS:  - Mild abdominal pain, nausea, belching, bloating or excessive gas:  rest,   eat lightly and use a heating pad.  - Sore Throat: treat with throat lozenges and/or gargle with warm salt   water.  - Because air was used during the procedure, expelling large amounts of air   from your rectum or belching is normal.  - If a bowel prep was taken, you may not have a bowel movement for 1-3 days.    This is normal.  SYMPTOMS TO WATCH FOR AND REPORT TO YOUR PHYSICIAN:  1. Abdominal pain or bloating, other than gas cramps.  2. Chest pain.  3. Back pain.  4. Signs of infection such as: chills or fever occurring within 24 hours   after the procedure.  5. Rectal bleeding, which would show as bright red, maroon, or black stools.   (A tablespoon of blood from the rectum is not serious, especially if    hemorrhoids are present.)  6. Vomiting.  7. Weakness or dizziness.  GO DIRECTLY TO THE NEAREST EMERGENCY ROOM IF YOU HAVE ANY OF THE FOLLOWING:      Difficulty breathing              Chills and/or fever over 101 F   Persistent vomiting and/or vomiting blood   Severe abdominal pain   Severe chest pain   Black, tarry stools   Bleeding- more than one tablespoon   Any other symptom or condition that you feel may need urgent attention  Your doctor recommends these additional instructions:  If any biopsies were taken, your doctors clinic will contact you in 1 to 2   weeks with any results.  - Discharge patient to home.   - Resume previous diet.   - Continue present medications.   - Await pathology results.   - Repeat colonoscopy in 5 years for surveillance.  For questions, problems or results please call your physician - Monie Cardenas MD at Work:  [phone].  [facility] can be reached at [phone]     IF A COMPLICATION OR EMERGENCY SITUATION ARISES AND YOU ARE UNABLE TO REACH   YOUR PHYSICIAN - GO DIRECTLY TO THE EMERGENCY ROOM.  Monie Cardenas MD  7/18/2025 12:36:23 PM  This report has been verified and signed electronically.  Dear patient,  As a result of recent federal legislation (The Federal Cures Act), you may   receive lab or pathology results from your procedure in your [facility]   account before your physician is able to contact you. Your physician or   their representative will relay the results to you with their   recommendations at their soonest availability.  Thank you,  PROVATION

## 2025-07-18 NOTE — H&P
Short Stay Endoscopy History and Physical    PCP - Yenny Roman MD    Procedure - Colonoscopy  ASA - per anesthesia  Mallampati - per anesthesia  History of Anesthesia problems - no  Family history Anesthesia problems - no   Plan of anesthesia - General    HPI:  This is a 69 y.o. female here for evaluation of : personal history of colon polyps      ROS:  Constitutional: No fevers, chills, No weight loss  CV: No chest pain  Pulm: No cough, No shortness of breath  GI: see HPI  Derm: No rash    Medical History:  has a past medical history of Depression, GERD (gastroesophageal reflux disease), High blood cholesterol, Hypertension, and Osteoporosis (03/26/2015).    Surgical History:  has a past surgical history that includes Tubal ligation; Esophagogastroduodenoscopy (N/A, 08/14/2018); and Cystoscopy.    Family History: family history includes Breast cancer in her maternal cousin, maternal cousin, paternal aunt, and sister; Cancer in her sister; Diabetes in her maternal uncle and sister; Hypertension in her mother; Hypothyroidism in her maternal aunt, maternal uncle, mother, and sister; Stroke in her mother.. Otherwise no colon cancer, inflammatory bowel disease, or GI malignancies.    Social History:  reports that she quit smoking about 25 years ago. Her smoking use included cigarettes. She started smoking about 35 years ago. She has a 3 pack-year smoking history. She has never used smokeless tobacco. She reports current alcohol use of about 2.0 standard drinks of alcohol per week. She reports that she does not use drugs.    Review of patient's allergies indicates:   Allergen Reactions    Iodinated contrast media Diarrhea and Rash       Medications:   Prescriptions Prior to Admission[1]      Physical Exam:    Vital Signs: There were no vitals filed for this visit.    Gen: NAD, lying comfortably  HENT: NCAT, oropharynx clear  Eyes: anicteric sclerae, EOMI grossly  Neck: supple, no visible  masses/goiter  Cardiac: RRR  Lungs: non-labored breathing  Abd: soft, NT/ND, normoactive BS  Ext: no LE edema, warm, well perfused  Skin: skin intact on exposed body parts, no visible rashes, lesions  Neuro: A&Ox4, neuro exam grossly intact, moves all extremities  Psych: appropriate mood, affect        Labs:  Lab Results   Component Value Date    WBC 4.72 05/20/2025    HGB 11.6 (L) 05/20/2025    HCT 36.2 (L) 05/20/2025     05/20/2025    CHOL 135 05/20/2025    TRIG 92 05/20/2025    HDL 37 (L) 05/20/2025    ALT 20 05/20/2025    AST 22 05/20/2025     05/20/2025    K 3.7 05/20/2025     05/20/2025    CREATININE 0.8 05/20/2025    BUN 11 05/20/2025    CO2 26 05/20/2025    TSH 0.989 05/20/2025    HGBA1C 5.4 10/07/2024       Plan:  Colonoscopy for a history of colon polyps    I have explained the risks and benefits of endoscopy procedures to the patient including but not limited to bleeding, perforation, infection, and death.  The patient was asked if they understand and allowed to ask any further questions to their satisfaction.    Monie Cardenas MD         [1]   Medications Prior to Admission   Medication Sig Dispense Refill Last Dose/Taking    alendronate (FOSAMAX) 70 MG tablet Take 1 tablet (70 mg total) by mouth every 7 days. 12 tablet 3     aspirin 81 MG Chew Take 81 mg by mouth once daily.       carvediloL (COREG) 25 MG tablet TAKE 1 TABLET  2  TIMES DAILY WITH MEALS. 180 tablet 3     cetirizine (ZYRTEC) 10 MG tablet Take 10 mg by mouth once daily.       diphenhydrAMINE (BENADRYL) 25 mg capsule Take 25 mg by mouth nightly.       fluorometholone 0.1% (FML) 0.1 % DrpS Place into both eyes.       hydroCHLOROthiazide 12.5 MG Tab Take 1 tablet (12.5 mg total) by mouth once daily. 90 tablet 3     ipratropium (ATROVENT) 21 mcg (0.03 %) nasal spray USE 1 SPRAY IN EACH NOSTRIL TWICE DAILY 30 mL 3     olmesartan (BENICAR) 20 MG tablet Take 1 tablet (20 mg total) by mouth once daily. 90 tablet 3      omeprazole (PRILOSEC) 40 MG capsule Take 1 capsule (40 mg total) by mouth once daily. 90 capsule 3     rosuvastatin (CRESTOR) 40 MG Tab Take 1 tablet (40 mg total) by mouth every evening.       vitamin D 1000 units Tab Take 185 mg by mouth every other day.        vitamin E 100 UNIT capsule Take 100 Units by mouth once daily.

## 2025-07-21 LAB
ESTROGEN SERPL-MCNC: NORMAL PG/ML
INSULIN SERPL-ACNC: NORMAL U[IU]/ML
LAB AP CLINICAL INFORMATION: NORMAL
LAB AP GROSS DESCRIPTION: NORMAL
LAB AP PERFORMING LOCATION(S): NORMAL
LAB AP REPORT FOOTNOTES: NORMAL

## 2025-08-19 DIAGNOSIS — M81.0 SENILE OSTEOPOROSIS: ICD-10-CM

## 2025-08-20 DIAGNOSIS — M81.0 SENILE OSTEOPOROSIS: ICD-10-CM

## 2025-08-20 RX ORDER — ALENDRONATE SODIUM 70 MG/1
70 TABLET ORAL
Qty: 12 TABLET | Refills: 3 | Status: SHIPPED | OUTPATIENT
Start: 2025-08-20

## 2025-08-20 RX ORDER — ALENDRONATE SODIUM 70 MG/1
70 TABLET ORAL
Qty: 4 TABLET | Refills: 12 | Status: SHIPPED | OUTPATIENT
Start: 2025-08-20